# Patient Record
Sex: MALE | Race: WHITE | Employment: FULL TIME | URBAN - METROPOLITAN AREA
[De-identification: names, ages, dates, MRNs, and addresses within clinical notes are randomized per-mention and may not be internally consistent; named-entity substitution may affect disease eponyms.]

---

## 2018-10-18 ENCOUNTER — OFFICE VISIT (OUTPATIENT)
Dept: ORTHOPEDIC SURGERY | Age: 38
End: 2018-10-18
Payer: COMMERCIAL

## 2018-10-18 VITALS
WEIGHT: 205 LBS | SYSTOLIC BLOOD PRESSURE: 114 MMHG | HEART RATE: 67 BPM | HEIGHT: 68 IN | DIASTOLIC BLOOD PRESSURE: 71 MMHG | BODY MASS INDEX: 31.07 KG/M2

## 2018-10-18 DIAGNOSIS — M25.561 RIGHT KNEE PAIN, UNSPECIFIED CHRONICITY: Primary | ICD-10-CM

## 2018-10-18 DIAGNOSIS — M17.11 PRIMARY OSTEOARTHRITIS OF RIGHT KNEE: ICD-10-CM

## 2018-10-18 DIAGNOSIS — M65.9 SYNOVITIS OF RIGHT KNEE: ICD-10-CM

## 2018-10-18 DIAGNOSIS — M22.41 CHONDROMALACIA PATELLAE OF RIGHT KNEE: ICD-10-CM

## 2018-10-18 PROBLEM — M65.961 SYNOVITIS OF RIGHT KNEE: Status: ACTIVE | Noted: 2018-10-18

## 2018-10-18 PROCEDURE — 20610 DRAIN/INJ JOINT/BURSA W/O US: CPT | Performed by: FAMILY MEDICINE

## 2018-10-18 PROCEDURE — MISCD110 LATERAL STABILIZER: Performed by: FAMILY MEDICINE

## 2018-10-18 PROCEDURE — 99243 OFF/OP CNSLTJ NEW/EST LOW 30: CPT | Performed by: FAMILY MEDICINE

## 2018-10-18 RX ORDER — PANTOPRAZOLE SODIUM 20 MG/1
TABLET, DELAYED RELEASE ORAL
COMMUNITY
Start: 2018-09-17 | End: 2020-11-02

## 2018-10-18 RX ORDER — DICLOFENAC SODIUM 75 MG/1
75 TABLET, DELAYED RELEASE ORAL 2 TIMES DAILY
Qty: 60 TABLET | Refills: 3 | Status: SHIPPED | OUTPATIENT
Start: 2018-10-18 | End: 2019-07-16 | Stop reason: ALTCHOICE

## 2018-10-18 NOTE — PROGRESS NOTES
Chief Complaint  Knee Pain (new patient, right knee pain)      Initial consultation episodic primarily anterior and occasionally medial right knee pain with swelling and palpable warmth    History of Present Illness:  Alanna Sarmiento is a 42 y.o. male who is a very pleasant active white male  for [employer] which is a fairly manual job is a patient of  Dr Mitesh Marquez who is being seen today in consultation from . Dr Mitesh Marqeuz for evaluation of ongoing episodic pain with palpable warmth and swelling over the past 18 months. Initially it was quite episodic happening 1 time about every 4 months but he has had a couple of episodes over the past 2 months where he has developed swelling and palpable warmth involving the knee. He was previously evaluated by Dr Mitesh Marquez but by that time his symptoms have resolved. He cannot recall any specific history of injury or new activity but his job is once again fairly manual in nature. He was sent for AP and lateral knee films on 9/14/2018 which did show some mild prepatellar soft tissue swelling with nonspecific joint effusion and some mild patellofemoral arthropathy. He was told to ice and take anti-inflammatories. He was also told to call should his symptoms recur which he did earlier this week. He was previously sent for an arthritis panel on 9/12/2018 which was negative for RAMILA with a negative uric acid. He had a negative rheumatoid factor back in 2017 He did not have his inflammatory markers checked. He has been icing and taking Tylenol. His job is fairly manual involving lots of squatting and kneeling. He does periodically use kneepads with working. His most frequent stair climbing. His most recent episode began on 10/16/2018 which prompted a phone call to Dr Mitesh Marquez who asked that we see him today for evaluation. He has had some crepitation and popping. No locking or catching.   No real night discomfort with Susana's testing without high-grade click. No evidence of instability. Screening hip test is benign. Minimal overpronation. Skin: There are no rashes, ulcerations or lesions. Distal neurovascular exam is intact. Gait: Fluid smooth gait. Minimal overpronation    Reflex symmetrically preserved    Additional Comments:     Additional Examinations:  Contralateral Exam: Examination of the left knee reveals intact skin. There is no focal tenderness. The patient demonstrates full painless range of motion with regards to flexion and extension. Strength is 5/5 thorough out all planes. Ligamentous stability is grossly intact. Right Lower Extremity: Examination of the right lower extremity does not show any tenderness, deformity or injury. Range of motion is unremarkable. There is no gross instability. There are no rashes, ulcerations or lesions. Strength and tone are normal.  Left Lower Extremity: Examination of the left lower extremity does not show any tenderness, deformity or injury. Range of motion is unremarkable. There is no gross instability. There are no rashes, ulcerations or lesions. Strength and tone are normal.      Diagnostic Test Findings: We did perform her right knee AP and PA weight-bearing and 7 results today in the office which show mild patellofemoral spurring and tilt. There is relative maintenance of his weightbearing spaces. Assessment :  #1.  Episodic recurrent right knee patellofemoral compression syndrome/chondromalacia with synovitis with mild underlying knee medial compartment osteoarthritis    Impression:  Encounter Diagnosis   Name Primary?  Right knee pain, unspecified chronicity Yes       Office Procedures:  Orders Placed This Encounter   Procedures    XR KNEE RIGHT (1-2 VIEWS)     Order Specific Question:   Reason for exam:     Answer:   pain       Treatment Plan:  Treatment options were discussed with Panda Wan.   We did review his plain

## 2018-10-24 ENCOUNTER — HOSPITAL ENCOUNTER (OUTPATIENT)
Dept: PHYSICAL THERAPY | Age: 38
Setting detail: THERAPIES SERIES
Discharge: HOME | End: 2018-10-24
Payer: COMMERCIAL

## 2018-10-24 PROCEDURE — G8978 MOBILITY CURRENT STATUS: HCPCS | Performed by: PHYSICAL THERAPIST

## 2018-10-24 PROCEDURE — 97110 THERAPEUTIC EXERCISES: CPT | Performed by: PHYSICAL THERAPIST

## 2018-10-24 PROCEDURE — 97161 PT EVAL LOW COMPLEX 20 MIN: CPT | Performed by: PHYSICAL THERAPIST

## 2018-10-24 PROCEDURE — 97112 NEUROMUSCULAR REEDUCATION: CPT | Performed by: PHYSICAL THERAPIST

## 2018-10-24 PROCEDURE — G8979 MOBILITY GOAL STATUS: HCPCS | Performed by: PHYSICAL THERAPIST

## 2018-10-24 NOTE — PLAN OF CARE
[address] [facility],  [address]  Phone: [phone]   SANDRA: [phone]                                                       Physical Therapy Certification    Dear Referring Practitioner: Dr. Krista Miller,    We had the pleasure of evaluating the following patient for physical therapy services at [address] [place] [address]. A summary of our findings can be found in the initial assessment below. This includes our plan of care. If you have any questions or concerns regarding these findings, please do not hesitate to contact me at the office phone number checked above. Thank you for the referral.       Physician Signature:_______________________________Date:__________________  By signing above (or electronic signature), therapists plan is approved by physician      Patient: Yadira Monroy   : 1980   MRN: [id number]  Referring Physician: Referring Practitioner: Dr. 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      Precautions/ Contra-indications: None   Latex Allergy:  [x]NO      []YES  Preferred Language for Healthcare:   [x]English       []other:    SUBJECTIVE: Patient stated complaint: states his right knee pain started a couple of years ago. He states the pain is sporadic. He states he would have pain and increased swelling that would last about a week. Then the swelling would go down and the pain would subside, but then it would happen again about 6 months later. He saw his PCP within the year, who ordered x-rays and said they were negative.  He knee/Hip   []Signs/symptoms consistent with functional hip weakness/NMR control      []Signs/symptoms consistent with tendinitis/tendinosis    []signs/symptoms consistent with pathology which may benefit from Dry needling      []other:      Prognosis/Rehab Potential:      [x]Excellent   [x]Good    []Fair   []Poor    Tolerance of evaluation/treatment:    [x]Excellent   [x]Good    []Fair   []Poor    Physical Therapy Evaluation Complexity Justification  [x] A history of present problem with:  [] no personal factors and/or comorbidities that impact the plan of care;  [x]1-2 personal factors and/or comorbidities that impact the plan of care  []3 personal factors and/or comorbidities that impact the plan of care  [x] An examination of body systems using standardized tests and measures addressing any of the following: body structures and functions (impairments), activity limitations, and/or participation restrictions;:  [] a total of 1-2 or more elements   [] a total of 3 or more elements   [x] a total of 4 or more elements   [x] A clinical presentation with:  [x] stable and/or uncomplicated characteristics   [] evolving clinical presentation with changing characteristics  [] unstable and unpredictable characteristics;   [x] Clinical decision making of [x] low, [] moderate, [] high complexity using standardized patient assessment instrument and/or measurable assessment of functional outcome.     [x] EVAL (LOW) 26698 (typically 20 minutes face-to-face)  [] EVAL (MOD) 22402 (typically 30 minutes face-to-face)  [] EVAL (HIGH) 50980 (typically 45 minutes face-to-face)  [] RE-EVAL       PLAN  Frequency/Duration: 1-2 days per week for 4 Weeks:  Interventions:  [x]  Therapeutic exercise including: strength training, ROM, for Lower extremity and core   [x]  NMR activation and proprioception for LE, Glutes and Core   [x]  Manual therapy as indicated for LE, Hip and spine to include: Dry Needling/IASTM, STM, PROM, Gr I-IV

## 2018-10-24 NOTE — FLOWSHEET NOTE
and LE for self care, mobility, lifting, and ambulation/stair navigation      Manual Treatments:  PROM / STM / Oscillations-Mobs:  G-I, II, III, IV (PA's, Inf., Post.)  [] (57951) Provided manual therapy to mobilize LE, proximal hip and/or LS spine soft tissue/joints for the purpose of modulating pain, promoting relaxation,  increasing ROM, reducing/eliminating soft tissue swelling/inflammation/restriction, improving soft tissue extensibility and allowing for proper ROM for normal function with self care, mobility, lifting and ambulation. Modalities:  Declined 10/24    Charges:  Timed Code Treatment Minutes: 25'   Total Treatment Minutes: 54'        [x] EVAL (LOW) 56648 (typically 20 minutes face-to-face)  [] EVAL (MOD) 81026 (typically 30 minutes face-to-face)  [] EVAL (HIGH) 43447 (typically 45 minutes face-to-face)  [] RE-EVAL   [x] YD(17242) x  1   [] IONTO  [x] NMR (57141) x  1   [] VASO  [] Manual (20835) x       [] Other:  [] TA x       [] Mech Traction (40191)  [] ES(attended) (95395)      [] ES (un) (57622):     GOALS:   Patient stated goal: get the right knee right    Therapist goals for Patient:   Short Term Goals: To be achieved in: 2 weeks  1. Independent in HEP and progression per patient tolerance, in order to prevent re-injury. 2. Patient will have a decrease in pain to facilitate improvement in movement, function, and ADLs as indicated by Functional Deficits. Long Term Goals: To be achieved in: 4 weeks  1. Disability index score of 5% or less for the LEFS to assist with reaching prior level of function. 2. Patient will demonstrate an increase in right hip (flex, ABD, and ext) and knee (flex and ext) strength to 4+/5 to allow for proper functional mobility as indicated by patients Functional Deficits. 3. Patient will return to ADLs without increased symptoms or restriction. 4. Patient will report kneeling at work pain free.      Progression Towards Functional goals:  [] Patient is

## 2018-10-29 ENCOUNTER — HOSPITAL ENCOUNTER (OUTPATIENT)
Dept: PHYSICAL THERAPY | Age: 38
Setting detail: THERAPIES SERIES
Discharge: HOME | End: 2018-10-29
Payer: COMMERCIAL

## 2018-10-29 PROCEDURE — 97112 NEUROMUSCULAR REEDUCATION: CPT | Performed by: PHYSICAL THERAPIST

## 2018-10-29 PROCEDURE — 97110 THERAPEUTIC EXERCISES: CPT | Performed by: PHYSICAL THERAPIST

## 2018-10-29 NOTE — FLOWSHEET NOTE
[address] [facility] [place] [address]  Phone: [phone]   WSJ: [phone]    Physical Therapy Daily Treatment Note  Date:  10/29/2018    Patient Name:  Alia Dubon    :  1980  MRN: [id number]  Restrictions/Precautions:    Medical/Treatment Diagnosis Information:  Diagnosis: Right knee pain - M25.561; Primary osteoarthritis of R knee - M17.11; Chondromalacia patellae of R knee - M22.41; Synovitis of right knee - M65.9  Treatment Diagnosis: Decreased strength;Decreased endurance;Decreased high-level IADLs; Insurance/Certification information:  PT Insurance Information: [place]   Physician Information:  Referring Practitioner: Dr. Akash Fuentes of care signed (Y/N): Y    Date of Patient follow up with Physician:     G-Code (if applicable):      Date G-Code Applied:  10/24/18  PT G-Codes  Functional Assessment Tool Used: WOMAC  Score: 10.4% limitation  Functional Limitation: Mobility: Walking and moving around  Mobility: Walking and Moving Around Current Status (): At least 1 percent but less than 20 percent impaired, limited or restricted  Mobility: Walking and Moving Around Goal Status (): 0 percent impaired, limited or restricted    Progress Note: []  Yes  [x]  No  Next due by: 2018       Latex Allergy:  [x]NO      []YES  Preferred Language for Healthcare:   [x]English       []other:    Visit # Insurance Allowable   2 30     Pain level: 0 /10 10/29     SUBJECTIVE:  States he was a little sore from the HEP. He states he had no problems at work, but he didn't normally have a constant pain at work.   10/29    OBJECTIVE: See eval 10/24  Observation:   Test measurements:      RESTRICTIONS/PRECAUTIONS: None     Exercises/Interventions:   Exercise/Equipment Resistance/Repetitions Other comments   Stretching     Hamstring - seated 30 sec x 5 Added 10/24   Towel Pull     Inclined Calf     Hip Flexion     ITB     Groin

## 2018-10-31 ENCOUNTER — HOSPITAL ENCOUNTER (OUTPATIENT)
Dept: PHYSICAL THERAPY | Age: 38
Setting detail: THERAPIES SERIES
Discharge: HOME | End: 2018-10-31
Payer: COMMERCIAL

## 2018-10-31 PROCEDURE — 97110 THERAPEUTIC EXERCISES: CPT | Performed by: PHYSICAL THERAPIST

## 2018-10-31 PROCEDURE — 97112 NEUROMUSCULAR REEDUCATION: CPT | Performed by: PHYSICAL THERAPIST

## 2018-10-31 PROCEDURE — 97530 THERAPEUTIC ACTIVITIES: CPT | Performed by: PHYSICAL THERAPIST

## 2018-10-31 NOTE — FLOWSHEET NOTE
SLR     Supine 10 x 3 #2 Added 10/24   Abduction 10 x 3 #2 Added 10/24   Adduction     Prone 10 x 3 #2 Added 10/24   SLR+ 15 sec x 5 Added 10/24        ROM     Sheet Pulls     Hang Weights          CKC     Calf raises 10 x 3 Added 10/29   Wall sits 15 sec x 5 Added 10/29   Step ups 10 x 3 L1 Added 10/29   1 leg stand 30 sec x 5 Added 10/24   Squatting     CC TKE     Balance     bridges     Clamshells 10 x 3, clamshells ^10/31   Lateral band walks 35' x 4 Added 10/31        PRE     Extension  RANGE:   Flexion  RANGE:        Quantum machines     Leg press      Leg extension     Leg curl          Manual interventions                 Therapeutic Exercise and NMR EXR  [x] (77928) Provided verbal/tactile cueing for activities related to strengthening, flexibility, endurance, ROM for improvements in LE, proximal hip, and core control with self care, mobility, lifting, ambulation.  [] (78293) Provided verbal/tactile cueing for activities related to improving balance, coordination, kinesthetic sense, posture, motor skill, proprioception  to assist with LE, proximal hip, and core control in self care, mobility, lifting, ambulation and eccentric single leg control.      NMR and Therapeutic Activities:    [] (58729 or 34236) Provided verbal/tactile cueing for activities related to improving balance, coordination, kinesthetic sense, posture, motor skill, proprioception and motor activation to allow for proper function of core, proximal hip and LE with self care and ADLs  [] (69032) Gait Re-education- Provided training and instruction to the patient for proper LE, core and proximal hip recruitment and positioning and eccentric body weight control with ambulation re-education including up and down stairs     Home Exercise Program:    [x] (55342) Reviewed/Progressed HEP activities related to strengthening, flexibility, endurance, ROM of core, proximal hip and LE for functional self-care, mobility, lifting and ambulation/stair

## 2018-11-05 ENCOUNTER — HOSPITAL ENCOUNTER (OUTPATIENT)
Dept: PHYSICAL THERAPY | Age: 38
Setting detail: THERAPIES SERIES
Discharge: HOME | End: 2018-11-05
Payer: COMMERCIAL

## 2018-11-05 PROCEDURE — 97112 NEUROMUSCULAR REEDUCATION: CPT | Performed by: PHYSICAL THERAPIST

## 2018-11-05 PROCEDURE — 97110 THERAPEUTIC EXERCISES: CPT | Performed by: PHYSICAL THERAPIST

## 2018-11-05 PROCEDURE — 97530 THERAPEUTIC ACTIVITIES: CPT | Performed by: PHYSICAL THERAPIST

## 2018-11-05 NOTE — FLOWSHEET NOTE
[address] [facility],[address], [address] [place] [address],  [address]  Phone: [phone]   VCV: [phone]    Physical Therapy Daily Treatment Note  Date:  2018    Patient Name:  Daysi Yancey    :  1980  MRN: [id number]  Restrictions/Precautions:    Medical/Treatment Diagnosis Information:  Diagnosis: Right knee pain - M25.561; Primary osteoarthritis of R knee - M17.11; Chondromalacia patellae of R knee - M22.41; Synovitis of right knee - M65.9  Treatment Diagnosis: Decreased strength;Decreased endurance;Decreased high-level IADLs; Insurance/Certification information:  PT Insurance Information: [place]   Physician Information:  Referring Practitioner: Dr. Eaton Mean of care signed (Y/N): Y    Date of Patient follow up with Physician:     G-Code (if applicable):      Date G-Code Applied:  10/24/18  PT G-Codes  Functional Assessment Tool Used: WOMAC  Score: 10.4% limitation  Functional Limitation: Mobility: Walking and moving around  Mobility: Walking and Moving Around Current Status (): At least 1 percent but less than 20 percent impaired, limited or restricted  Mobility: Walking and Moving Around Goal Status (): 0 percent impaired, limited or restricted    Progress Note: []  Yes  [x]  No  Next due by: 2018       Latex Allergy:  [x]NO      []YES  Preferred Language for Healthcare:   [x]English       []other:    Visit # Insurance Allowable   4 30     Pain level: 0  /10      SUBJECTIVE:  States he did have some swelling on Friday night () after getting on the floor and playing with his kids. He had lateral knee pain and patellar tendon pain and a normal amount of his swelling.       OBJECTIVE: See eval 10/24  Observation:   Test measurements:      RESTRICTIONS/PRECAUTIONS: None     Exercises/Interventions:   Exercise/Equipment Resistance/Repetitions Other comments   Stretching     Hamstring - seated 30 sec x 5 Added 10/24 [x] (22703) Reviewed/Progressed HEP activities related to strengthening, flexibility, endurance, ROM of core, proximal hip and LE for functional self-care, mobility, lifting and ambulation/stair navigation   [] (18394)Reviewed/Progressed HEP activities related to improving balance, coordination, kinesthetic sense, posture, motor skill, proprioception of core, proximal hip and LE for self care, mobility, lifting, and ambulation/stair navigation      Manual Treatments:  PROM / STM / Oscillations-Mobs:  G-I, II, III, IV (PA's, Inf., Post.)  [] (69865) Provided manual therapy to mobilize LE, proximal hip and/or LS spine soft tissue/joints for the purpose of modulating pain, promoting relaxation,  increasing ROM, reducing/eliminating soft tissue swelling/inflammation/restriction, improving soft tissue extensibility and allowing for proper ROM for normal function with self care, mobility, lifting and ambulation. Modalities:  Declined 11/5    Charges:  Timed Code Treatment Minutes: 39'    Total Treatment Minutes: 46'         [] EVAL (LOW) 29749 (typically 20 minutes face-to-face)  [] EVAL (MOD) 15767 (typically 30 minutes face-to-face)  [] EVAL (HIGH) 64181 (typically 45 minutes face-to-face)  [] RE-EVAL   [x] KJ(72156) x  1   [] IONTO  [x] NMR (80292) x  1   [] VASO  [] Manual (16760) x       [] Other:  [x] TA x  1    [] Mech Traction (37509)  [] ES(attended) (39283)      [] ES (un) (21266):     GOALS:   Patient stated goal: get the right knee right    Therapist goals for Patient:   Short Term Goals: To be achieved in: 2 weeks  1. Independent in HEP and progression per patient tolerance, in order to prevent re-injury. 2. Patient will have a decrease in pain to facilitate improvement in movement, function, and ADLs as indicated by Functional Deficits. Long Term Goals: To be achieved in: 4 weeks  1. Disability index score of 5% or less for the LEFS to assist with reaching prior level of function.    2. Patient

## 2018-11-12 ENCOUNTER — HOSPITAL ENCOUNTER (OUTPATIENT)
Dept: PHYSICAL THERAPY | Age: 38
Setting detail: THERAPIES SERIES
Discharge: HOME | End: 2018-11-12
Payer: COMMERCIAL

## 2018-11-12 PROCEDURE — 97530 THERAPEUTIC ACTIVITIES: CPT | Performed by: PHYSICAL THERAPIST

## 2018-11-12 PROCEDURE — 97112 NEUROMUSCULAR REEDUCATION: CPT | Performed by: PHYSICAL THERAPIST

## 2018-11-12 PROCEDURE — G8979 MOBILITY GOAL STATUS: HCPCS | Performed by: PHYSICAL THERAPIST

## 2018-11-12 PROCEDURE — 97110 THERAPEUTIC EXERCISES: CPT | Performed by: PHYSICAL THERAPIST

## 2018-11-12 PROCEDURE — G8978 MOBILITY CURRENT STATUS: HCPCS | Performed by: PHYSICAL THERAPIST

## 2018-11-12 NOTE — PLAN OF CARE
right knee - M65.9  Treatment Diagnosis: Decreased strength;Decreased endurance;Decreased high-level IADLs; Insurance/Certification information:  PT Insurance Information: [employer]   Physician Information:  Referring Practitioner: Dr. Kemal Child of care signed (Y/N): Y    Date of Patient follow up with Physician:     G-Code (if applicable):      Date G-Code Applied:  11/12/18  PT G-Codes  Functional Assessment Tool Used: WOMAC  Score: 5% limitation  Functional Limitation: Mobility: Walking and moving around  Mobility: Walking and Moving Around Current Status (): At least 1 percent but less than 20 percent impaired, limited or restricted  Mobility: Walking and Moving Around Goal Status (): 0 percent impaired, limited or restricted       Progress Note: [x]  Yes  []  No  Next due by: 10 Dec 2018       Latex Allergy:  [x]NO      []YES  Preferred Language for Healthcare:   [x]English       []other:    Visit # Insurance Allowable   5 30     Pain level: 0  /10 11/12     SUBJECTIVE:  States he has not had any episodes of pain or swelling since last session. He states he is still trying to avoid kneeling on his RLE while working. Pt sees the MD on Thursday.  11/12    OBJECTIVE: 11/12  Observation:   Test measurements:         Flexibility L R Comment   Hamstring Lacking 38 deg  Lacking 25 deg      ITB WFL  WFL      Quad Decreased WFL                         ROM AROM Comment     L R     Flexion 142 145     Extension 0 2 deg hyperext                     Strength L R Comment   Quad 4/5 4+/5     Hamstring 4/5 4+/5               Hip  flexion 4/5 4/5     Hip abd 4/5 4+/5     Hip ext  4-/5 4+/5                     Functional Mobility/Transfers: Decreased ability to kneel      DL squat: knee valgus, narrow stance of support        RESTRICTIONS/PRECAUTIONS: None     Exercises/Interventions:   Exercise/Equipment Resistance/Repetitions Other comments   Stretching     Hamstring - seated 30 sec x 5 Added 10/24   Towel Pull down stairs     Home Exercise Program:    [x] (56948) Reviewed/Progressed HEP activities related to strengthening, flexibility, endurance, ROM of core, proximal hip and LE for functional self-care, mobility, lifting and ambulation/stair navigation   [] (14221)Reviewed/Progressed HEP activities related to improving balance, coordination, kinesthetic sense, posture, motor skill, proprioception of core, proximal hip and LE for self care, mobility, lifting, and ambulation/stair navigation      Manual Treatments:  PROM / STM / Oscillations-Mobs:  G-I, II, III, IV (PA's, Inf., Post.)  [] (20678) Provided manual therapy to mobilize LE, proximal hip and/or LS spine soft tissue/joints for the purpose of modulating pain, promoting relaxation,  increasing ROM, reducing/eliminating soft tissue swelling/inflammation/restriction, improving soft tissue extensibility and allowing for proper ROM for normal function with self care, mobility, lifting and ambulation. Modalities:  Declined 11/12    Charges:  Timed Code Treatment Minutes: 45    Total Treatment Minutes: 60'        [] EVAL (LOW) 83874 (typically 20 minutes face-to-face)  [] EVAL (MOD) 44825 (typically 30 minutes face-to-face)  [] EVAL (HIGH) 40196 (typically 45 minutes face-to-face)  [] RE-EVAL   [x] CY(21598) x  1   [] IONTO  [x] NMR (66946) x  1   [] VASO  [] Manual (85216) x       [] Other:  [x] TA x  1    [] Mech Traction (92761)  [] ES(attended) (04517)      [] ES (un) (89729):     GOALS: 11/12  Patient stated goal: get the right knee right - Progressing towards    Therapist goals for Patient:   Short Term Goals: To be achieved in: 2 weeks  1. Independent in HEP and progression per patient tolerance, in order to prevent re-injury. - MET  2. Patient will have a decrease in pain to facilitate improvement in movement, function, and ADLs as indicated by Functional Deficits. - MET    Long Term Goals: To be achieved in: 4 weeks  1.  Disability index score of 5% or less

## 2018-11-15 ENCOUNTER — OFFICE VISIT (OUTPATIENT)
Dept: ORTHOPEDIC SURGERY | Age: 38
End: 2018-11-15
Payer: COMMERCIAL

## 2018-11-15 VITALS
DIASTOLIC BLOOD PRESSURE: 81 MMHG | SYSTOLIC BLOOD PRESSURE: 118 MMHG | HEIGHT: 68 IN | BODY MASS INDEX: 31.07 KG/M2 | WEIGHT: 205.03 LBS | HEART RATE: 69 BPM

## 2018-11-15 DIAGNOSIS — M22.41 CHONDROMALACIA PATELLAE OF RIGHT KNEE: ICD-10-CM

## 2018-11-15 DIAGNOSIS — M25.561 RIGHT KNEE PAIN, UNSPECIFIED CHRONICITY: ICD-10-CM

## 2018-11-15 DIAGNOSIS — M17.11 PRIMARY OSTEOARTHRITIS OF RIGHT KNEE: Primary | ICD-10-CM

## 2018-11-15 DIAGNOSIS — M65.9 SYNOVITIS OF RIGHT KNEE: ICD-10-CM

## 2018-11-15 PROCEDURE — 99214 OFFICE O/P EST MOD 30 MIN: CPT | Performed by: FAMILY MEDICINE

## 2018-11-16 NOTE — PROGRESS NOTES
Chief Complaint  Knee Pain (CK RIGHT KNEE)      Follow-up right knee symptomatic patellofemoral compression syndrome with chondromalacia knee synovitis and mild underlying medial compartment osteoarthritis with synovitis    History of Present Illness:  Shalonda Lo is a 42 y.o. male who is a very pleasant active white male  for [employer] which is a fairly manual job is a patient of  Dr Roma Ramachandran who is being seen today in consultation from Dr. Dr Roma Ramachandran for evaluation of ongoing episodic pain with palpable warmth and swelling over the past 18 months. Initially it was quite episodic happening 1 time about every 4 months but he has had a couple of episodes over the past 2 months where he has developed swelling and palpable warmth involving the knee. He was previously evaluated by Dr Roma Ramachandran but by that time his symptoms have resolved. He cannot recall any specific history of injury or new activity but his job is once again fairly manual in nature. He was sent for AP and lateral knee films on 9/14/2018 which did show some mild prepatellar soft tissue swelling with nonspecific joint effusion and some mild patellofemoral arthropathy. He was told to ice and take anti-inflammatories. He was also told to call should his symptoms recur which he did earlier this week. He was previously sent for an arthritis panel on 9/12/2018 which was negative for RAMILA with a negative uric acid. He had a negative rheumatoid factor back in 2017 He did not have his inflammatory markers checked. He has been icing and taking Tylenol. His job is fairly manual involving lots of squatting and kneeling. He does periodically use kneepads with working. His most frequent stair climbing. His most recent episode began on 10/16/2018 which prompted a phone call to Dr Roma Ramachandran who asked that we see him today for evaluation. He has had some crepitation and popping.   No locking or catching. No real night pain. He does have an achy pain anteriorly involving the name at 2-3 out of 10 but it can be very prominently painful coming home from work at 7-8 out of 10. He does typically ice. He is being seen today for orthopedic and sports consultation and review of his imaging. He was seen initially in the office in 10/18/2018 was started on conservative treatment for his right knee. Initially he believed he was substantially improved with improvement of nearly 100% for the 1st couple of weeks into treatment. Over the last 2 weeks, he has noticed gradual worsening of pain was into the anterior medial portion of his knee to the radius slid back to about a 60% improvement. Repetitive stair climbing and squatting kneeling and stooping as well as climbing ladders as painful. He has had crepitation and popping. The knee brace does help with his pseudo-buckling. He does catch. He isn't taking his anti-inflammatories only episodically has been reasonably compliant with his home exercises. He did attend physical therapy. Overall his symptoms do seem to be worsening. Medical History     Patient's medications, allergies, past medical, surgical, social and family histories were reviewed and updated as appropriate. Review of Systems  Pertinent items are noted in HPI  Review of systems reviewed from Patient History Form dated on 10/18/2018 and available in the patient's chart under the Media tab. Vital Signs  Vitals:    11/15/18 0947   BP: 118/81   Pulse: 69       General Exam:     Constitutional: Patient is adequately groomed with no evidence of malnutrition  DTRs: Deep tendon reflexes are intact  Mental Status: The patient is oriented to time, place and person. The patient's mood and affect are appropriate. Lymphatic: The lymphatic examination bilaterally reveals all areas to be without enlargement or induration.   Vascular: Examination reveals no swelling or calf not show any tenderness, deformity or injury. Range of motion is unremarkable. There is no gross instability. There are no rashes, ulcerations or lesions. Strength and tone are normal.      Diagnostic Test Findings:       Assessment :  #1. Persistent symptomatically worsening right knee patellofemoral compression syndrome/chondromalacia with synovitis with mild underlying knee medial compartment osteoarthritis    Impression:  Encounter Diagnoses   Name Primary?  Primary osteoarthritis of right knee Yes    Chondromalacia patellae of right knee     Synovitis of right knee     Right knee pain, unspecified chronicity        Office Procedures:  Orders Placed This Encounter   Procedures    MRI KNEE RIGHT WO CONTRAST     Standing Status:   Future     Standing Expiration Date:   11/15/2019     Scheduling Instructions:      Pro Scan [place]     Order Specific Question:   Reason for exam:     Answer:   evaluate oa/cmp. r/o mmt       Treatment Plan:  Treatment options were discussed with Ivett Damon. We did review his previous plain films and exam findings. He has had episodic primarily anterior knee pain symptoms which I suspect is related to patellofemoral compression syndrome/chondromalacia although he does have some mild weightbearing osteoarthritis. With initial treatment, he felt as if he was improving but over the last couple weeks he does seem to be worsening once again. We did set him up for an MRI to evaluate the degree of his degenerative changes and to rule out a medial meniscus tear. I did encourage him to take his diclofenac twice daily consistently utilize his brace to perform his home-based exercises. Icing and activity modification was discussed. I will see him back post-imaging. Potential for Visco supplementation was also discussed. He will contact us the interim with questions or concerns and we will see him back after his MRI.       Cc: Dr. Nic Combs      This dictation

## 2018-11-21 ENCOUNTER — TELEPHONE (OUTPATIENT)
Dept: ORTHOPEDIC SURGERY | Age: 38
End: 2018-11-21

## 2018-11-21 NOTE — TELEPHONE ENCOUNTER
Spoke to patient and informed them that their MRI/CT has been authorized and that they can call and schedule scan at their convenience. Also told them that they can call and schedule a f/u with Dr. Adelfo Fraire once they have MRI/CT scheduled, leaving at least 2-3 days for our office to receive their results.

## 2018-12-13 ENCOUNTER — OFFICE VISIT (OUTPATIENT)
Dept: ORTHOPEDIC SURGERY | Age: 38
End: 2018-12-13
Payer: COMMERCIAL

## 2018-12-13 VITALS
BODY MASS INDEX: 33.34 KG/M2 | DIASTOLIC BLOOD PRESSURE: 82 MMHG | SYSTOLIC BLOOD PRESSURE: 122 MMHG | WEIGHT: 220 LBS | HEART RATE: 69 BPM | HEIGHT: 68 IN

## 2018-12-13 DIAGNOSIS — M70.41 PREPATELLAR BURSITIS OF RIGHT KNEE: Primary | ICD-10-CM

## 2018-12-13 DIAGNOSIS — M17.11 PRIMARY OSTEOARTHRITIS OF RIGHT KNEE: ICD-10-CM

## 2018-12-13 DIAGNOSIS — M25.561 RIGHT KNEE PAIN, UNSPECIFIED CHRONICITY: ICD-10-CM

## 2018-12-13 DIAGNOSIS — M22.41 CHONDROMALACIA PATELLAE OF RIGHT KNEE: ICD-10-CM

## 2018-12-13 PROCEDURE — 99213 OFFICE O/P EST LOW 20 MIN: CPT | Performed by: FAMILY MEDICINE

## 2018-12-13 PROCEDURE — 20610 DRAIN/INJ JOINT/BURSA W/O US: CPT | Performed by: FAMILY MEDICINE

## 2018-12-13 RX ORDER — DICLOFENAC SODIUM 75 MG/1
75 TABLET, DELAYED RELEASE ORAL 2 TIMES DAILY
Qty: 60 TABLET | Refills: 3 | Status: SHIPPED | OUTPATIENT
Start: 2018-12-13 | End: 2019-01-15 | Stop reason: SDUPTHER

## 2018-12-13 NOTE — PROGRESS NOTES
Injection administration details:  Date & Time: 12/13/18 8:59 AM  Site & Comments:Right Knee: Prepatellar bursa administered by Dr Randall Morales    Betamethasone (30mg/mL)  # of cc: 1  [facility]:6130-7503-47   Lot number: [id number]  EXP: 03/2020    Bupivacaine 0.25%  # of cc:1  [facility]: 0256-8851-20  Lot number: [id number]  EXP: 02/1/2019    1% Lidocaine (10mg/ mL)  # of cc:1  [facility]: 6786-7571-38  Lot number: [id number]  EXP: 05/2020

## 2018-12-13 NOTE — PROGRESS NOTES
Chief Complaint  Knee Pain (follow up on right knee MRI results)      Follow-up right knee symptomatic patellofemoral compression syndrome with chondromalacia knee synovitis and mild underlying medial compartment osteoarthritis with synovitis with prepatellar bursitis. Review of imaging    History of Present Illness:  Lázaro Golden is a 34 y.o. male who is a very pleasant active white male  for [employer] which is a fairly manual job is a patient of  Dr Linda Parker who is being seen today in consultation from . Dr Linda Parker for evaluation of ongoing episodic pain with palpable warmth and swelling over the past 18 months. Initially it was quite episodic happening 1 time about every 4 months but he has had a couple of episodes over the past 2 months where he has developed swelling and palpable warmth involving the knee. He was previously evaluated by Dr Linda Parker but by that time his symptoms have resolved. He cannot recall any specific history of injury or new activity but his job is once again fairly manual in nature. He was sent for AP and lateral knee films on 9/14/2018 which did show some mild prepatellar soft tissue swelling with nonspecific joint effusion and some mild patellofemoral arthropathy. He was told to ice and take anti-inflammatories. He was also told to call should his symptoms recur which he did earlier this week. He was previously sent for an arthritis panel on 9/12/2018 which was negative for RAMILA with a negative uric acid. He had a negative rheumatoid factor back in 2017 He did not have his inflammatory markers checked. He has been icing and taking Tylenol. His job is fairly manual involving lots of squatting and kneeling. He does periodically use kneepads with working. His most frequent stair climbing.   His most recent episode began on 10/16/2018 which prompted a phone call to Dr Linda Parker who asked that we see him kneeling repetitive stair climbing and positional changes. He has been more compliant with taking his anti-inflammatories and is trying to do his home-based exercises. He did not go back to physical therapy after his last visit. Medical History     Patient's medications, allergies, past medical, surgical, social and family histories were reviewed and updated as appropriate. Review of Systems  Pertinent items are noted in HPI  Review of systems reviewed from Patient History Form dated on 10/18/2018 and available in the patient's chart under the Media tab. Vital Signs  Vitals:    12/13/18 0833   BP: 122/82   Pulse: 69       General Exam:     Constitutional: Patient is adequately groomed with no evidence of malnutrition  DTRs: Deep tendon reflexes are intact  Mental Status: The patient is oriented to time, place and person. The patient's mood and affect are appropriate. Lymphatic: The lymphatic examination bilaterally reveals all areas to be without enlargement or induration. Vascular: Examination reveals no swelling or calf tenderness. Peripheral pulses are palpable and 2+. Neurological: The patient has good coordination. There is no weakness or sensory deficit. Knee Examination  Inspection:  There is no high-grade deformity or palpable warmth currently. He may have a trace knee joint effusion he does have some patellofemoral crepitation. Focal swelling over the prepatellar bursa. No erythematous change or change suggest septic joint. Palpation:  He does have slightly less prominent tenderness over the medial and lateral patellofemoral facet. He does have mild anterior 3rd medial joint line tenderness. No lateral tenderness. He does have some posterior medial knee joint line tenderness worsening with Susana's. Rang of Motion:  He does have full active and passive range of motion with tightness to his hamstrings. Strength:  4 plus out of 5 with flexion and extension.     Special bursitis of right knee Yes    Chondromalacia patellae of right knee     Right knee pain, unspecified chronicity        Office Procedures:  Orders Placed This Encounter   Procedures    Ambulatory referral to Physical Therapy     Referral Priority:   Routine     Referral Type:   Eval and Treat     Referral Reason:   Specialty Services Required     Requested Specialty:   Physical Therapy     Number of Visits Requested:   1    EUFLEXXA INJ PER DOSE     Right Knee     Standing Status:   Future     Standing Expiration Date:   12/13/2019    IN ARTHROCENTESIS ASPIR&/INJ MAJOR JT/BURSA W/O US    IN BETAMETHASONE ACET&SOD PHOSP       Treatment Plan:  Treatment options were discussed with Tamiko Santoyo. We did review his MRI films and exam findings. He has had episodic primarily anterior knee pain symptoms which I suspect is related to patellofemoral compression syndrome/chondromalacia although he does have some mild weightbearing osteoarthritis. He seems to be having most of his symptoms from his prepatellar bursitis. We did aspirate his prepatellar bursa with return of about 10 mL of bloody bursal fluid followed by instillation of 1 mL of Celestone, 1 of Marcaine, 1 of Xylocaine. We'll like him to go back to physical therapy for local modalities such as ultrasound. He will continue with this patellar protection program and patellofemoral rehab. He was strongly encouraged to take his anti-inflammatories And was changed to diclofenac 75 mg 1 pill twice daily. Collette Bar He will avoid direct kneeling and icing and activity modification was discussed. Potential for Visco supplementation also discussed. We'll see him back in 4 weeks for clinical recheck. He will contact us with questions or concerns. Cc: Dr. Mona Adrian      This dictation was performed with a verbal recognition program [facility]) and it was checked for errors. It is possible that there are still dictated errors within this office note.  If

## 2018-12-17 ENCOUNTER — HOSPITAL ENCOUNTER (OUTPATIENT)
Dept: PHYSICAL THERAPY | Age: 38
Setting detail: THERAPIES SERIES
Discharge: HOME | End: 2018-12-17
Payer: COMMERCIAL

## 2018-12-17 PROCEDURE — 97530 THERAPEUTIC ACTIVITIES: CPT | Performed by: PHYSICAL THERAPIST

## 2018-12-17 PROCEDURE — G8979 MOBILITY GOAL STATUS: HCPCS | Performed by: PHYSICAL THERAPIST

## 2018-12-17 PROCEDURE — G8978 MOBILITY CURRENT STATUS: HCPCS | Performed by: PHYSICAL THERAPIST

## 2018-12-17 PROCEDURE — 97110 THERAPEUTIC EXERCISES: CPT | Performed by: PHYSICAL THERAPIST

## 2018-12-17 NOTE — PLAN OF CARE
Added 10/24   Towel Pull     Inclined Calf     Hip Flexion     ITB     Groin     Quad -  prone 30 sec x 5 Added 10/24        SLR     Supine 10 x 3 #4 ^12/17   Abduction 10 x 3 #4 ^12/17   Adduction     Prone 10 x 3 #4 ^12/17   SLR+ 30 sec x 5 ^11/5        ROM     Sheet Pulls     Hang Weights          CKC     Calf raises  Added 10/29   Wall sits 15 sec x 5 with red loop TB with hip ABD  ^12/17   Step ups 10 x 3 green + pink ^12/17   1 leg stand 30 sec x 5 Added 10/24   Squatting     CC TKE     Balance     bridges     Clamshells 10 x 3, clamshells, red loop TB  ^10/31   Lateral band walks 35' x 4, red loop TB  Added 10/31   TRX squats 10 x 3 Added 11/5   TRX lunges 10 x 3 Added 11/12   Stool scoots  Added 11/5        PRE     Extension  RANGE:   Flexion  RANGE:        Quantum machines     Leg press  Added 11/5   Leg extension    Leg curl Added 11/12        Manual interventions                 Therapeutic Exercise and NMR EXR  [x] (98240) Provided verbal/tactile cueing for activities related to strengthening, flexibility, endurance, ROM for improvements in LE, proximal hip, and core control with self care, mobility, lifting, ambulation.  [] (51291) Provided verbal/tactile cueing for activities related to improving balance, coordination, kinesthetic sense, posture, motor skill, proprioception  to assist with LE, proximal hip, and core control in self care, mobility, lifting, ambulation and eccentric single leg control.      NMR and Therapeutic Activities:    [] (96588 or 15866) Provided verbal/tactile cueing for activities related to improving balance, coordination, kinesthetic sense, posture, motor skill, proprioception and motor activation to allow for proper function of core, proximal hip and LE with self care and ADLs  [] (11895) Gait Re-education- Provided training and instruction to the patient for proper LE, core and proximal hip recruitment and positioning and eccentric body weight control with ambulation index score of 5% or less for the LEFS to assist with reaching prior level of function. - Not MET  2. Patient will demonstrate an increase in right hip (flex, ABD, and ext) and knee (flex and ext) strength to 4+/5 to allow for proper functional mobility as indicated by patients Functional Deficits. - Progressing towards  3. Patient will return to ADLs without increased symptoms or restriction. - Not MET   4. Patient will report kneeling at work pain free. - Not MET    Progression Towards Functional goals:  [x] Patient is progressing as expected towards functional goals listed. [] Progression is slowed due to complexities listed. [] Progression has been slowed due to co-morbidities. [] Plan just implemented, too soon to assess goals progression  [] Other:     ASSESSMENT:      Treatment/Activity Tolerance:  [x] Patient tolerated treatment well [] Patient limited by fatique  [] Patient limited by pain  [] Patient limited by other medical complications  [] Other: Pt returns after MRI results. He maintains knee flex ROM and hip and knee strength. He however continues to demonstrate min decreased form with DL squats. Recommend pt return to 1-2x/ week to progress strength training and begin functional training to return to PLOF.      12/17    Patient education: Patient education on PT and plan of care including diagnosis, prognosis, treatment goals and options. Patient agrees with discussed POC and treatment and is aware of rehab process.  10/24    Prognosis: [x] Good [] Fair  [] Poor    Patient Requires Follow-up: [x] Yes  [] No    PLAN: 1-2x/ week for 4 - 6 weeks (12/17 - 1/14)  [x] Continue per plan of care [] Alter current plan (see comments)  [] Plan of care initiated [] Hold pending MD visit [] Discharge    Next Session:     Electronically signed by: Bentley Connell PT, DPT

## 2018-12-18 ENCOUNTER — TELEPHONE (OUTPATIENT)
Dept: ORTHOPEDIC SURGERY | Age: 38
End: 2018-12-18

## 2018-12-19 ENCOUNTER — HOSPITAL ENCOUNTER (OUTPATIENT)
Dept: PHYSICAL THERAPY | Age: 38
Setting detail: THERAPIES SERIES
Discharge: HOME | End: 2018-12-19
Payer: COMMERCIAL

## 2018-12-19 NOTE — FLOWSHEET NOTE
Physical Therapy  Cancellation/No-show Note  Patient Name:  Leydi Payne  :  1980   Date:  2018  Cancelled visits to date: 01  No-shows to date: 0    For today's appointment patient:  [x]  Cancelled  []  Rescheduled appointment  []  No-show     Reason given by patient:  []  Patient ill  []  Conflicting appointment  []  No transportation    [x]  Conflict with work  []  No reason given  []  Other:     Comments: Pt cancelled due to work.       Electronically signed by:  Tim Hayward PT

## 2018-12-24 ENCOUNTER — HOSPITAL ENCOUNTER (OUTPATIENT)
Dept: PHYSICAL THERAPY | Age: 38
Setting detail: THERAPIES SERIES
Discharge: HOME | End: 2018-12-24
Payer: COMMERCIAL

## 2018-12-24 PROCEDURE — 97530 THERAPEUTIC ACTIVITIES: CPT | Performed by: PHYSICAL THERAPIST

## 2018-12-24 PROCEDURE — 97110 THERAPEUTIC EXERCISES: CPT | Performed by: PHYSICAL THERAPIST

## 2018-12-24 NOTE — FLOWSHEET NOTE
abd 4/5 4/5     Hip ext  4-/5 4+/5                     Functional Mobility/Transfers: Decreased ability to kneel      DL squat: min knee valgus first rep then corrected          RESTRICTIONS/PRECAUTIONS: None     Exercises/Interventions:   Exercise/Equipment Resistance/Repetitions Other comments   Bike 5'  Added 12/17   Stretching     Hamstring - seated 30 sec x 5 Added 10/24   Towel Pull     Inclined Calf     Hip Flexion     ITB     Groin     Quad -  prone 30 sec x 5 Added 10/24        SLR     Supine 10 x 3 #4 ^12/17   Abduction 10 x 3 #4 ^12/17   Adduction     Prone 10 x 3 #4 ^12/17   SLR+ 30 sec x 5 ^11/5        ROM     Sheet Pulls     Hang Weights          CKC     Calf raises  Added 10/29   Wall sits 15 sec x 5 with red loop TB with hip ABD  ^12/17   Step ups 10 x 3 green + pink ^12/17   1 leg stand 30 sec x 5, areomat ^12/24   Squatting     CC TKE     Balance     bridges     Clamshells 10 x 3, clamshells, red loop TB  ^10/31   Lateral band walks 35' x 4, red loop TB  Added 10/31   TRX squats 10 x 3 Added 11/5   TRX lunges 10 x 3 Added 11/12   Stool scoots  Added 11/5        PRE     Extension  RANGE:   Flexion  RANGE:        Quantum machines     Leg press  10 x 3 #130 ^12/24   Leg extension     Leg curl 10 x 3 #70 ^12/24        Manual interventions                 Therapeutic Exercise and NMR EXR  [x] (19367) Provided verbal/tactile cueing for activities related to strengthening, flexibility, endurance, ROM for improvements in LE, proximal hip, and core control with self care, mobility, lifting, ambulation.  [] (23158) Provided verbal/tactile cueing for activities related to improving balance, coordination, kinesthetic sense, posture, motor skill, proprioception  to assist with LE, proximal hip, and core control in self care, mobility, lifting, ambulation and eccentric single leg control.      NMR and Therapeutic Activities:    [] (41151 or ) Provided verbal/tactile cueing for activities related to improving towards    Therapist goals for Patient:   Short Term Goals: To be achieved in: 2 weeks  1. Independent in HEP and progression per patient tolerance, in order to prevent re-injury. - MET  2. Patient will have a decrease in pain to facilitate improvement in movement, function, and ADLs as indicated by Functional Deficits. - MET    Long Term Goals: To be achieved in: 4 weeks  1. Disability index score of 5% or less for the LEFS to assist with reaching prior level of function. - Not MET  2. Patient will demonstrate an increase in right hip (flex, ABD, and ext) and knee (flex and ext) strength to 4+/5 to allow for proper functional mobility as indicated by patients Functional Deficits. - Progressing towards  3. Patient will return to ADLs without increased symptoms or restriction. - Not MET   4. Patient will report kneeling at work pain free. - Not MET    Progression Towards Functional goals:  [x] Patient is progressing as expected towards functional goals listed. [] Progression is slowed due to complexities listed. [] Progression has been slowed due to co-morbidities. [] Plan just implemented, too soon to assess goals progression  [] Other:     ASSESSMENT:      Treatment/Activity Tolerance:  [x] Patient tolerated treatment well [] Patient limited by fatique  [] Patient limited by pain  [] Patient limited by other medical complications  [] Other: Pt shara session well. He shara increased strength, noted by increased resistance. He shara progression of SLS, reporting increased challenge with the areomat. 12/24    Patient education: Patient education on PT and plan of care including diagnosis, prognosis, treatment goals and options. Patient agrees with discussed POC and treatment and is aware of rehab process.  10/24    Prognosis: [x] Good [] Fair  [] Poor    Patient Requires Follow-up: [x] Yes  [] No    PLAN: 1-2x/ week for 4 - 6 weeks (12/17 - 1/14)  [x] Continue per plan of care [] Alter current plan (see

## 2018-12-26 ENCOUNTER — HOSPITAL ENCOUNTER (OUTPATIENT)
Dept: PHYSICAL THERAPY | Age: 38
Setting detail: THERAPIES SERIES
Discharge: HOME | End: 2018-12-26
Payer: COMMERCIAL

## 2018-12-26 PROCEDURE — 97110 THERAPEUTIC EXERCISES: CPT | Performed by: PHYSICAL THERAPIST

## 2018-12-26 PROCEDURE — 97530 THERAPEUTIC ACTIVITIES: CPT | Performed by: PHYSICAL THERAPIST

## 2019-01-03 ENCOUNTER — HOSPITAL ENCOUNTER (OUTPATIENT)
Dept: PHYSICAL THERAPY | Age: 39
Setting detail: THERAPIES SERIES
Discharge: HOME | End: 2019-01-03
Payer: COMMERCIAL

## 2019-01-07 ENCOUNTER — TELEPHONE (OUTPATIENT)
Dept: ORTHOPEDIC SURGERY | Age: 39
End: 2019-01-07

## 2019-01-07 ENCOUNTER — HOSPITAL ENCOUNTER (OUTPATIENT)
Dept: PHYSICAL THERAPY | Age: 39
Setting detail: THERAPIES SERIES
Discharge: HOME | End: 2019-01-07
Payer: COMMERCIAL

## 2019-01-07 PROCEDURE — 97112 NEUROMUSCULAR REEDUCATION: CPT | Performed by: PHYSICAL THERAPIST

## 2019-01-07 PROCEDURE — 97530 THERAPEUTIC ACTIVITIES: CPT | Performed by: PHYSICAL THERAPIST

## 2019-01-07 PROCEDURE — 97110 THERAPEUTIC EXERCISES: CPT | Performed by: PHYSICAL THERAPIST

## 2019-01-09 ENCOUNTER — HOSPITAL ENCOUNTER (OUTPATIENT)
Dept: PHYSICAL THERAPY | Age: 39
Setting detail: THERAPIES SERIES
Discharge: HOME | End: 2019-01-09
Payer: COMMERCIAL

## 2019-01-09 PROCEDURE — 97110 THERAPEUTIC EXERCISES: CPT | Performed by: PHYSICAL THERAPIST

## 2019-01-09 PROCEDURE — 97530 THERAPEUTIC ACTIVITIES: CPT | Performed by: PHYSICAL THERAPIST

## 2019-01-15 ENCOUNTER — OFFICE VISIT (OUTPATIENT)
Dept: ORTHOPEDIC SURGERY | Age: 39
End: 2019-01-15
Payer: COMMERCIAL

## 2019-01-15 ENCOUNTER — HOSPITAL ENCOUNTER (OUTPATIENT)
Dept: PHYSICAL THERAPY | Age: 39
Setting detail: THERAPIES SERIES
Discharge: HOME | End: 2019-01-15
Payer: COMMERCIAL

## 2019-01-15 VITALS
BODY MASS INDEX: 33.35 KG/M2 | DIASTOLIC BLOOD PRESSURE: 85 MMHG | SYSTOLIC BLOOD PRESSURE: 119 MMHG | HEIGHT: 68 IN | HEART RATE: 89 BPM | WEIGHT: 220.02 LBS

## 2019-01-15 DIAGNOSIS — M25.561 RIGHT KNEE PAIN, UNSPECIFIED CHRONICITY: ICD-10-CM

## 2019-01-15 DIAGNOSIS — M54.50 LUMBAR SPINE PAIN: Primary | ICD-10-CM

## 2019-01-15 DIAGNOSIS — R20.0 NUMBNESS OF RIGHT ANTERIOR THIGH: ICD-10-CM

## 2019-01-15 DIAGNOSIS — M70.41 PREPATELLAR BURSITIS OF RIGHT KNEE: ICD-10-CM

## 2019-01-15 DIAGNOSIS — M17.11 PRIMARY OSTEOARTHRITIS OF RIGHT KNEE: ICD-10-CM

## 2019-01-15 DIAGNOSIS — M22.41 CHONDROMALACIA PATELLAE OF RIGHT KNEE: ICD-10-CM

## 2019-01-15 DIAGNOSIS — M65.9 SYNOVITIS OF RIGHT KNEE: ICD-10-CM

## 2019-01-15 PROCEDURE — G8979 MOBILITY GOAL STATUS: HCPCS | Performed by: PHYSICAL THERAPIST

## 2019-01-15 PROCEDURE — 97530 THERAPEUTIC ACTIVITIES: CPT | Performed by: PHYSICAL THERAPIST

## 2019-01-15 PROCEDURE — 97110 THERAPEUTIC EXERCISES: CPT | Performed by: PHYSICAL THERAPIST

## 2019-01-15 PROCEDURE — G8978 MOBILITY CURRENT STATUS: HCPCS | Performed by: PHYSICAL THERAPIST

## 2019-01-15 PROCEDURE — G8980 MOBILITY D/C STATUS: HCPCS | Performed by: PHYSICAL THERAPIST

## 2019-01-15 PROCEDURE — 99214 OFFICE O/P EST MOD 30 MIN: CPT | Performed by: FAMILY MEDICINE

## 2019-01-15 RX ORDER — METHYLPREDNISOLONE 4 MG/1
TABLET ORAL
Qty: 21 KIT | Refills: 0 | Status: SHIPPED | OUTPATIENT
Start: 2019-01-15 | End: 2019-04-11 | Stop reason: ALTCHOICE

## 2019-04-11 ENCOUNTER — OFFICE VISIT (OUTPATIENT)
Dept: ORTHOPEDIC SURGERY | Age: 39
End: 2019-04-11
Payer: COMMERCIAL

## 2019-04-11 ENCOUNTER — TELEPHONE (OUTPATIENT)
Dept: ORTHOPEDIC SURGERY | Age: 39
End: 2019-04-11

## 2019-04-11 VITALS
SYSTOLIC BLOOD PRESSURE: 126 MMHG | BODY MASS INDEX: 32.58 KG/M2 | DIASTOLIC BLOOD PRESSURE: 82 MMHG | HEART RATE: 72 BPM | HEIGHT: 68 IN | WEIGHT: 215 LBS

## 2019-04-11 DIAGNOSIS — M50.30 DDD (DEGENERATIVE DISC DISEASE), CERVICAL: ICD-10-CM

## 2019-04-11 DIAGNOSIS — M19.011 PRIMARY OSTEOARTHRITIS OF RIGHT SHOULDER: ICD-10-CM

## 2019-04-11 DIAGNOSIS — M67.911 TENDINOPATHY OF RIGHT ROTATOR CUFF: ICD-10-CM

## 2019-04-11 DIAGNOSIS — M79.601 RIGHT ARM PAIN: ICD-10-CM

## 2019-04-11 DIAGNOSIS — M75.41 SHOULDER IMPINGEMENT, RIGHT: ICD-10-CM

## 2019-04-11 DIAGNOSIS — M25.511 CHRONIC RIGHT SHOULDER PAIN: Primary | ICD-10-CM

## 2019-04-11 DIAGNOSIS — M54.2 NECK PAIN: ICD-10-CM

## 2019-04-11 DIAGNOSIS — G89.29 CHRONIC RIGHT SHOULDER PAIN: Primary | ICD-10-CM

## 2019-04-11 PROCEDURE — 99214 OFFICE O/P EST MOD 30 MIN: CPT | Performed by: FAMILY MEDICINE

## 2019-04-11 RX ORDER — METHYLPREDNISOLONE 4 MG/1
TABLET ORAL
Qty: 21 KIT | Refills: 0 | Status: SHIPPED | OUTPATIENT
Start: 2019-04-11 | End: 2019-07-16 | Stop reason: ALTCHOICE

## 2019-04-11 RX ORDER — DICLOFENAC SODIUM 75 MG/1
75 TABLET, DELAYED RELEASE ORAL 2 TIMES DAILY
Qty: 60 TABLET | Refills: 3 | Status: SHIPPED | OUTPATIENT
Start: 2019-04-11 | End: 2021-02-09

## 2019-04-11 NOTE — PATIENT INSTRUCTIONS
If you're currently taking an anti-inflammatory such as advil, aleve, ibuprofen, diclofenac, naproxen, meloxicam, celebrex, or nabumetone, please stop. Take Medrol first for 6 days. This is a steroid pack. Flip the package over to the foil side and the directions will tell you to start with 6 pills the first day, 5 pills the second day, etc. Please do not take any other anti-inflammatories with the medrol dose marimar as this can upset your stomach. If something else is needed, you may take extra strength tylenol.      Once you are finished with the medrol, then you may re-start or start your anti-inflammatory: Diclofenac 2x/day

## 2019-04-11 NOTE — PROGRESS NOTES
Some discomfort with glenohumeral grind and labral testing. Negative apprehension testing. He does complain of discomfort with axial load testing and right-sided cervical and trapezial discomfort with Spurling's testing. Skin: There are no rashes, ulcerations or lesions. Distal motor sensory and vascular exam is intact. Gait: Fluid smooth gait. Reflexes:  Symmetrically preserved. Additional Comments: Cervical spine evaluation does reveal reasonable cervical motion with intact isometric testing. Once again pain noted with axial load testing and right-sided cervical and trapezial pain with Spurling's testing. This does not radiate pain into his upper extremity. There does not appear to be focal upper extremity motor deficits and sensory exam and DTRs appear to be preserved. Additional Examinations  Contralateral exam:Examination of the left shoulder reveals no atrophy or deformity. The skin is warm and dry. Range of motion is within normal limits. There is no focal tenderness with palpation. No AC joint tenderness. Negative Neer's and Cuevas-Don exams. Strength is graded 5/5 throughout. Right Upper Extremity:  Examination of the right upper extremity does not show any tenderness, deformity or injury. Range of motion is unremarkable. There is no gross instability. There are no rashes, ulcerations or lesions. Strength and tone are normal.  Left Upper Extremity: Examination of the left upper extremity does not show any tenderness, deformity or injury. Range of motion is unremarkable. There is no gross instability. There are no rashes, ulcerations or lesions. Strength and tone are normal.         Diagnostic Test Findings:   Right shoulder true AP outlet and axillary films are obtained today and show on the moderate a.c. degenerative changes with downsloping to his acromion and minimal inferior glenoid spurring. No evidence of humeral head elevation.   AP and lateral cervical spine films were obtained today and show loss of cervical lordosis with degenerative just changes at C5 6 and C6 7 without evidence of fracture or high-grade spondylolisthesis. Assessment:  #1.  4 weeks status post progressively worsening right shoulder pain with mild to moderate a.c. and mild glenohumeral osteoarthritis with cuff tendinopathy and clinical impingement with shoulder weakness. #2.  4 weeks status post aggravation cervical degenerative disc disease with cervical myofascial pain and infrequent episodic right arm numbness and tingling. Impression:    Encounter Diagnoses   Name Primary?  Chronic right shoulder pain Yes    Neck pain     DDD (degenerative disc disease), cervical     Primary osteoarthritis of right shoulder     Right arm pain     Tendinopathy of right rotator cuff     Shoulder impingement, right        Office Procedures:     Orders Placed This Encounter   Procedures    XR SHOULDER RIGHT (MIN 2 VIEWS)     3V Rt Shoulder     Standing Status:   Future     Number of Occurrences:   1     Standing Expiration Date:   4/11/2020     Order Specific Question:   Reason for exam:     Answer:   Shoulder Pain    XR CERVICAL SPINE (2-3 VIEWS)     Standing Status:   Future     Number of Occurrences:   1     Standing Expiration Date:   4/11/2020     Order Specific Question:   Reason for exam:     Answer:   PAIN    MRI Shoulder Right WO Contrast     Standing Status:   Future     Standing Expiration Date:   5/11/2019     Scheduling Instructions:      [facility] [place] [address]      [phone]            AUTH #:      TIME AND DATE TBD      PLEASE CALL PATIENT ONCE APPROVED TO SCHEDULE             Remember that it may take several business days to pre-cert your MRI through your insurance. Our office will contact you as soon as we have the approval.             We will not give any test results over the phone.  Please

## 2019-04-11 NOTE — TELEPHONE ENCOUNTER
Spoke to patient and informed them that their MRI has been authorized and that they can call and schedule scan at their convenience. Also told them that they can call and schedule a f/u with Dr. Bhaskar Flanagan once they have MRI scheduled, leaving at least 2-3 days for our office to receive their results.

## 2019-04-18 ENCOUNTER — OFFICE VISIT (OUTPATIENT)
Dept: ORTHOPEDIC SURGERY | Age: 39
End: 2019-04-18
Payer: COMMERCIAL

## 2019-04-18 VITALS
BODY MASS INDEX: 32.58 KG/M2 | WEIGHT: 215 LBS | SYSTOLIC BLOOD PRESSURE: 107 MMHG | DIASTOLIC BLOOD PRESSURE: 70 MMHG | HEART RATE: 73 BPM | HEIGHT: 68 IN

## 2019-04-18 DIAGNOSIS — M67.911 TENDINOPATHY OF RIGHT ROTATOR CUFF: ICD-10-CM

## 2019-04-18 DIAGNOSIS — M24.111 LABRAL TEAR OF SHOULDER, DEGENERATIVE, RIGHT: ICD-10-CM

## 2019-04-18 DIAGNOSIS — M19.019 AC JOINT ARTHROPATHY: ICD-10-CM

## 2019-04-18 DIAGNOSIS — M79.601 RIGHT ARM PAIN: ICD-10-CM

## 2019-04-18 DIAGNOSIS — M25.511 CHRONIC RIGHT SHOULDER PAIN: Primary | ICD-10-CM

## 2019-04-18 DIAGNOSIS — G89.29 CHRONIC RIGHT SHOULDER PAIN: Primary | ICD-10-CM

## 2019-04-18 DIAGNOSIS — M75.41 SHOULDER IMPINGEMENT, RIGHT: ICD-10-CM

## 2019-04-18 DIAGNOSIS — M50.30 DDD (DEGENERATIVE DISC DISEASE), CERVICAL: ICD-10-CM

## 2019-04-18 PROBLEM — M25.811 SHOULDER IMPINGEMENT, RIGHT: Status: ACTIVE | Noted: 2019-04-18

## 2019-04-18 PROCEDURE — 20605 DRAIN/INJ JOINT/BURSA W/O US: CPT | Performed by: FAMILY MEDICINE

## 2019-04-18 PROCEDURE — 99214 OFFICE O/P EST MOD 30 MIN: CPT | Performed by: FAMILY MEDICINE

## 2019-04-18 PROCEDURE — 20610 DRAIN/INJ JOINT/BURSA W/O US: CPT | Performed by: FAMILY MEDICINE

## 2019-04-18 RX ORDER — BETAMETHASONE SODIUM PHOSPHATE AND BETAMETHASONE ACETATE 3; 3 MG/ML; MG/ML
18 INJECTION, SUSPENSION INTRA-ARTICULAR; INTRALESIONAL; INTRAMUSCULAR; SOFT TISSUE ONCE
Status: COMPLETED | OUTPATIENT
Start: 2019-04-18 | End: 2019-04-18

## 2019-04-18 RX ADMIN — BETAMETHASONE SODIUM PHOSPHATE AND BETAMETHASONE ACETATE 18 MG: 3; 3 INJECTION, SUSPENSION INTRA-ARTICULAR; INTRALESIONAL; INTRAMUSCULAR; SOFT TISSUE at 10:15

## 2019-04-18 NOTE — PROGRESS NOTES
Chief Complaint    Shoulder Pain (follow up right shoulder -- MRI results)    Follow up chronic right shoulder and cervical pain. Review of right shoulder imaging    History of Present Illness:  Leydi Payne is a 34 y.o. male who is a very pleasant active white male  for [employer] which is a fairly manual job is a patient of  Dr Nancy Sewell who is being seen today upon self-referral for evaluation of an orthopedic condition to his right shoulder and right cervical spine. He has been seen in the past for his mechanical back pain and right knee arthritis and is doing reasonably well with both conditions. He is not complaining of high-grade right radicular symptoms most continue with his home-based exercises. He is being seen today for evaluation of progressively worsening right shoulder pain and stiffness to the right side of the cervical spine. He states that he has had episodic pain with active use his right shoulder for the past 3-4 years but does not recall specific history of actual injury. Since roughly mid March 2019 he has had more consistent pain to his right shoulder superiorly and posteriorly with some pain laterally most associated with overhead activity reaching away from his body as well as reaching cross body internal rotation does feel weak and stiff. He has had a history of popping in his shoulder. He does not recall any specific history of actual injury but once again his job is fairly manual he does do frequent yardwork. He has progressed the point where he is having a consistent achy pain ranges between a 5-9 out of 10 and is having difficulty sleeping. He does have weakness. He does have soreness and stiffness of the right service cervical spine with occasional episodic infrequent numbness to the right upper extremity. He has continued to take his diclofenac 75 mg 1 pill twice daily but has had no additional treatment or imaging.   He is not really complaining of true active locking or catching but does have weakness to his shoulder. He has continued to work and is being seen today for orthopedic and sports consultation with imaging. He was seen initially in the office on 4/11/2019 was tentatively diagnosed with right shoulder mild glenohumeral osteoarthritis with a.c. arthropathy clinical impingement and suspected cuff tendinopathy and sent for an MRI. He is also having some cervical myofascial pain and was found to have underlying cervical degenerative disc disease. On the Medrol Wade he reported about 40-50% improvement. He is now taking his diclofenac twice daily and his symptoms have slid back to some degree. He has been able to work. His MRI was obtained at [facility] on 4/16/2019 and did show evidence of chronic labral tearing with probably related to impingement secondary to a.c. joint per atrophy and a downsloping type II acromion and a.c. arthrosis. He did have moderate cuff tendinopathy and tendinitis with some interstitial tearing but no evidence of full-thickness rotator cuff tearing was noted. He did have biceps tendon tendinopathy which may be evolving into a chronic split tear. He does believe anti-inflammatories as helped his cervical soreness and stiffness and once again does have very intermittent achiness and numbness to the right arm and is not having focal motor deficits. He does seem to be sleeping better at night. He would like to avoid surgery if possible. Medical History  Patient's medications, allergies, past medical, surgical, social and family histories were reviewed and updated as appropriate. Review of Systems  Relevant review of systems reviewed on 4/11/2019 and available in the patient's chart under the medial tab.        Vital Signs  Vitals:    04/18/19 0947   BP: 107/70   Pulse: 73         General Exam:   Constitutional: Patient is adequately groomed with no evidence of malnutrition  DTRs: Deep tendon right-sided cervical and trapezial pain with Spurling's testing. This does not radiate pain into his upper extremity. There does not appear to be focal upper extremity motor deficits and sensory exam and DTRs appear to be preserved. Additional Examinations  Contralateral exam:Examination of the left shoulder reveals no atrophy or deformity. The skin is warm and dry. Range of motion is within normal limits. There is no focal tenderness with palpation. No AC joint tenderness. Negative Neer's and Cuevas-Don exams. Strength is graded 5/5 throughout. Right Upper Extremity:  Examination of the right upper extremity does not show any tenderness, deformity or injury. Range of motion is unremarkable. There is no gross instability. There are no rashes, ulcerations or lesions. Strength and tone are normal.  Left Upper Extremity: Examination of the left upper extremity does not show any tenderness, deformity or injury. Range of motion is unremarkable. There is no gross instability. There are no rashes, ulcerations or lesions. Strength and tone are normal.         Diagnostic Test Findings:   Right shoulder MRI obtained 2019 as listed above  Narrative   Site: [facility] Rad #: [id number] #: [id number] Procedure: MR Right Shoulder joint w/o Contrast ; Reason for Exam: r/o rotator cuff tear, evaluate oa, impingement. chronic pain, primary osteoarthritis, tendinopathy of rotator cuff   This document is confidential medical information.  Unauthorized disclosure or use of this information is prohibited by law. If you are not the intended recipient of this document, please advise us by calling immediately [phone].       [facility] - [address] [place] [address]           Patient Name: Frandy Caro   Case ID: [id number]   Patient : 1980   Referring Physician: Umer Sidhu MD   Exam Date: 2019   Exam Description: MR Right Shoulder joint w/o Arcuate segment biceps long head tendinopathy.  Subtle intrasubstance signal change within    the arcuate segment as it exits the joint relates to evolving or coalescing intrasubstance    longitudinal split tearing.  Tendon is perched on a hypertrophied lesser tuberosity at the site    of subscapularis injury and pulley injury. 5. Please see above.       Thank you for the opportunity to provide your interpretation.               Ashlie Dick MD       A: Rahul 04/16/2019 9:35 AM   T: [place] 04/16/2019 9:28 AM                Assessment:  #1.  5 weeks status post progressively worsening right shoulder pain with mild to moderate a.c. and mild glenohumeral osteoarthritis with cuff tendinopathy and clinical impingement with shoulder weakness. #2.  5 weeks status post aggravation cervical degenerative disc disease with cervical myofascial pain and infrequent episodic right arm numbness and tingling. Impression:    Encounter Diagnoses   Name Primary?  Chronic right shoulder pain Yes    Tendinopathy of right rotator cuff     Shoulder impingement, right     DDD (degenerative disc disease), cervical     Right arm pain        Office Procedures:     No orders of the defined types were placed in this encounter. Treatment Plan:  Treatment options were discussed with Pako Mcgee today. We reviewed his imaging of his right shoulder is previous plain films of the cervical spine. I do think his shoulder is his primary pain generator does have documented tendinopathy and impingement with chronic labral tearing and tendinopathy. We discussed both operative and nonoperative interventions and he would like to try conservative treatment initially which I think is advisable. We did perform a right shoulder subacromial injection today using 2 mL of Celestone, 4 mL of Marcaine, 3 mL of Xylocaine. We also performed a right shoulder a.c. injection using 1 mL of Celestone, 1 mL of Marcaine, 1 mL of Xylocaine.   I would like for him to continue with his diclofenac 75 mg 1 pill twice daily will started physical therapy. He was made aware that we may eventually need to do an MRI of the cervical spine and possibly her right upper extremity EMG. He does believe he is capable of working but will try to avoid repetitive overhead activity. Icing and activity modification was discussed. We will see him back in 3-4 weeks for reevaluation to see how he is progressing in therapy. Once again he would like to avoid surgical intervention if at all possible. .            This dictation was performed with a verbal recognition program (DRAGON) and it was checked for errors. It is possible that there are still dictated errors within this office note. If so, please bring any errors to my attention for an addendum. All efforts were made to ensure that this office note is accurate.

## 2019-04-24 ENCOUNTER — HOSPITAL ENCOUNTER (OUTPATIENT)
Dept: PHYSICAL THERAPY | Age: 39
Setting detail: THERAPIES SERIES
Discharge: HOME | End: 2019-04-24
Payer: COMMERCIAL

## 2019-04-24 PROCEDURE — 97161 PT EVAL LOW COMPLEX 20 MIN: CPT

## 2019-04-24 PROCEDURE — 97530 THERAPEUTIC ACTIVITIES: CPT

## 2019-04-24 PROCEDURE — 97110 THERAPEUTIC EXERCISES: CPT

## 2019-04-24 NOTE — PLAN OF CARE
[facility], [address] [place], [address]  Phone: [phone]   Fax: [phone]          Physical Therapy Certification    Dear Referring Practitioner: Anuja Das,    We had the pleasure of evaluating the following patient for physical therapy services at [address] [place] [address]. A summary of our findings can be found in the initial assessment below. This includes our plan of care. If you have any questions or concerns regarding these findings, please do not hesitate to contact me at the office phone number checked above. Thank you for the referral.       Physician Signature:_______________________________Date:__________________  By signing above (or electronic signature), therapists plan is approved by physician      Patient: Deuce Alford   : 1980   MRN: [id number]  Referring Physician: Referring Practitioner: Anuja Das      Evaluation Date: 2019      Medical Diagnosis Information:  Diagnosis: M75.41 (ICD-10-CM) - Shoulder impingement, right   Treatment Diagnosis: M25.511, G89.29 (ICD-10-CM) - Chronic right shoulder pain                                           Precautions/ Contra-indications:   Latex Allergy:  ?NO      ? YES  Preferred Language for Healthcare:   ?English       ? other:    SUBJECTIVE:    Per MD note : He was seen initially in the office on 2019 was tentatively diagnosed with right shoulder mild glenohumeral osteoarthritis with a.c. arthropathy clinical impingement and suspected cuff tendinopathy and sent for an MRI. He is also having some cervical myofascial pain and was found to have underlying cervical degenerative disc disease. On the Medrol Wade he reported about 40-50% improvement. He is now taking his diclofenac twice daily and his symptoms have slid back to some degree. He has been able to work.   His MRI was obtained at [facility] on 2019 and did show evidence of chronic labral tearing with probably related to impingement secondary to a.c. joint per atrophy and a downsloping type II acromion and a.c. arthrosis. He did have moderate cuff tendinopathy and tendinitis with some interstitial tearing but no evidence of full-thickness rotator cuff tearing was noted. He did have biceps tendon tendinopathy which may be evolving into a chronic split tear. He does believe anti-inflammatories as helped his cervical soreness and stiffness and once again does have very intermittent achiness and numbness to the right arm and is not having focal motor deficits. He does seem to be sleeping better at night. He would like to avoid surgery if possible. Today: pt received cortisone injection on 4/18. Pt notes that it took away most of the pain, will still have pain after a long day at 3/10. He does note that lifting over head is still the most problematic. He also feels stiff with reaching across body and behind back. He does note that he has issues getting to sleep because of pain and he has been waking up 3-4x/night d/t pain if he rolls onto right side. He will have some discomfort while driving if arm up on wheel for long periods of time. Relevant Medical History: hx of shoulder injury to right side in hs otherwise nonsignificant   Functional Disability Index: UEFI    Pain Scale: 3/10 at end of day   Easing factors: cortisone injection   Provocative factors: reaching overhead, behind back      Type: ?Constant   ? Intermittent  ? Radiating ? Localized ? other:     Numbness/Tingling: \"asleep\" feeling in hand after sleeping with arm under head or reaching overhead for long periods of time. Functional Limitations/Impairments: x? Lifting/reaching ? Grooming x? Carrying    ? ADL's ? Driving ? Sports/Recreations   ? Other:    Occupation/School:      Living Status/Prior Level of Function: Independent with ADLs and IADLs, weightlifting,     OBJECTIVE:     CERV ROM     Cervical Flexion [facility] Cervical Extension WFL    Cervical SB Tight to L>R    Cervical rotation Tight to L>R    Reflexes/Sensation (myotomes/dermatomes): intact     Joint mobility: x? Normal    ?Hypo   ? Hyper    Palpation: no TTP     Functional Mobility/Transfers: wfl     Posture: rounded forward shoulders    Bandages/Dressings/Incisions: na    Gait: (include devices/WB status) wfl    Orthopedic Special Tests:     ROM PROM AROM  Comment    L R L R    Flexion   170 160    Abduction   170 155    ER   80 70    IR   55 45    Other        Other             Strength L R Comment   Flexion 5 4+ Increased winging on R with all planes resistance    Abduction 5 4; discomfort     ER 5 4+    IR 5 4+    Supraspinatus      Upper Trap      Lower Trap      Mid Trap      Rhomboids      Biceps      Triceps      Horizontal Abduction      Horizontal Adduction      Lats        Special Tests Left Right   Apley Scratch IR: to T6  ER:   Cross body: posterior opposite shoulder  IR: to L1-2; painful   ER:  Cross Body: anterior opposite shoulder; painful superior shoulder   Neer's     Full Can     Empty Can     Jimmie Rachele     Nerve Tension Testing     Speed's     Camacho's      Spurling's     Repeated Scaption                                       ? Patient history, allergies, meds reviewed. Medical chart reviewed. See intake form. Review Of Systems (ROS):  ?Performed Review of systems (Integumentary, CardioPulmonary, Neurological) by intake and observation. Intake form has been scanned into medical record. Patient has been instructed to contact their primary care physician regarding ROS issues if not already being addressed at this time. Co-morbidities/Complexities (which will affect course of rehabilitation): x? None           Arthritic conditions   ? Rheumatoid arthritis (M05.9)  ? Osteoarthritis (M19.91)   Cardiovascular conditions   ? Hypertension (I10)  ? Hyperlipidemia (E78.5)  ? Angina pectoris (I20)  ? Atherosclerosis (I70)   Musculoskeletal conditions   ? Disc pathology   ? Congenital spine pathologies   ? Prior surgical intervention  ? Osteoporosis (M81.8)  ? Osteopenia (M85.8)   Endocrine conditions   ? Hypothyroid (E03.9)  ? Hyperthyroid Gastrointestinal conditions   ? Constipation (G63.76)   Metabolic conditions   ? Morbid obesity (E66.01)  ? Diabetes type 1(E10.65) or 2 (E11.65)   ? Neuropathy (G60.9)     Pulmonary conditions   ? Asthma (J45)  ? Coughing   ? COPD (J44.9)   Psychological Disorders  ? Anxiety (F41.9)  ? Depression (F32.9)   ? Other:   ?Other:          Barriers to/and or personal factors that will affect rehab potential:            x  ? Age  ? Sex              ? Motivation/Lack of Motivation                        ? Co-Morbidities              ? Cognitive Function, education/learning barriers              ? Environmental, home barriers             x ?profession/work barriers: frequent overhead lifting/reaching  ? past PT/medical experience  ? other:       Falls Risk Assessment (30 days):   ? Falls Risk assessed and no intervention required. ? Falls Risk assessed and Patient requires intervention due to being higher risk   TUG score (>12s at risk):     ? Falls education provided, including       Functional Assessment:  PT G-Chace  Functional Assessment Tool Used: UEFI  Score: 19%   Functional Limitation: Carrying, moving and handling objects    ASSESSMENT:   Functional Impairments   ? Noted spinal or UE joint hypomobility   ? Noted spinal or UE joint hypermobility   x? Decreased UE functional ROM   x? Decreased UE functional strength   ? Abnormal reflexes/sensation/myotomal/dermatomal deficits   x? Decreased RC/scapular/core strength and neuromuscular control   ?other:      Functional Activity Limitations (from functional questionnaire and intake)   x? Reduced ability to tolerate prolonged functional positions   ? Reduced ability or difficulty with changes of positions or transfers between positions   ? Reduced ability to maintain good posture and demonstrate good body mechanics with sitting, bending, and lifting   x? Reduced ability or tolerance with driving and/or computer work   x? Reduced ability to sleep   x? Reduced ability to perform lifting, reaching, carrying tasks   ? Reduced ability to tolerate impact through UE   x? Reduced ability to reach behind back   ? Reduced ability to  or hold objects   ? Reduced ability to throw or toss an object   ? other:    Participation Restrictions   x? Reduced participation in self care activities   x? Reduced participation in home management activities   x? Reduced participation in work activities   x? Reduced participation in social activities. ?Reduced participation in sport/recreation activities. Classification:   ?Signs/symptoms consistent with post-surgical status including decreased ROM, strength and function. ? Signs/symptoms consistent with joint sprain/strain   x? Signs/symptoms consistent with shoulder impingement   x? Signs/symptoms consistent with shoulder/elbow/wrist tendinopathy   ? Signs/symptoms consistent with Rotator cuff tear   x? Signs/symptoms consistent with labral tear   ? Signs/symptoms consistent with postural dysfunction    ? Signs/symptoms consistent with Glenohumeral IR Deficit - <45 degrees   ? Signs/symptoms consistent with facet dysfunction of cervical/thoracic spine    ? Signs/symptoms consistent with pathology which may benefit from Dry needling     ?other:     Prognosis/Rehab Potential:      ?Excellent   x? Good    ? Fair   ? Poor    Tolerance of evaluation/treatment:    ?Excellent   x? Good    ? Fair   ? Poor    PLAN:  Frequency/Duration: 1-2 days per week for 4-6 Weeks:  INTERVENTIONS:  x? Therapeutic exercise including: strength training, ROM, for Upper extremity and core   x? NMR activation and proprioception for UE, scap and Core   x? Manual therapy as indicated for shoulder, scapula and spine to include: Dry Needling/IASTM, STM, PROM, Gr I-IV mobilizations, manipulation. x?  Modalities as needed that may include: thermal agents, E-stim, Biofeedback, US, iontophoresis as indicated  x? Patient education on joint protection, postural re-education, activity modification, progression of HEP. HEP instruction: The patient's home exercise program was reviewed and they were educated on appropriate frequency, duration and intensity. The enclosed activities were performed and new exercises were added to H.E. P. with detailed pictures included. (see scanned forms)    GOALS:  Patient stated goal: to reduce pain during work/daily life     Therapist goals for Patient:   Short Term Goals: To be achieved in: 2 weeks  1. Independent in HEP and progression per patient tolerance, in order to prevent re-injury. 2. Patient will have a decrease in pain to facilitate improvement in movement, function, and ADLs as indicated by Functional Deficits. Long Term Goals: To be achieved in: 4-6 weeks  1. Disability index score of 10% or less for the UEFI to assist with reaching prior level of function. 2. Patient will demonstrate increased AROM to 160+ flexion/abduction, 50+ IR to allow for proper joint functioning as indicated by patients Functional Deficits. 3. Patient will demonstrate an increase in Strength to 4+/5 or greater painfree to allow for proper functional mobility as indicated by patients Functional Deficits. 4. Patient will return to overhead lifting/reaching without increased symptoms or restriction. 5. Patient will sleep with less than 1-2 disturbances from pain throughout the night. Physical Therapy Evaluation Complexity Justification  ? A history of present problem with:  ? no personal factors and/or comorbidities that impact the plan of care; x?1-2 personal factors and/or comorbidities that impact the plan of care  ? 3 personal factors and/or comorbidities that impact the plan of care  ?  An examination of body systems using standardized tests and measures addressing any of the following: body structures and functions (impairments), activity limitations, and/or participation restrictions;:  x? a total of 1-2 or more elements   ? a total of 3 or more elements   ? a total of 4 or more elements   ? A clinical presentation with:  x? stable and/or uncomplicated characteristics   ? evolving clinical presentation with changing characteristics  ? unstable and unpredictable characteristics;   ? Clinical decision making of ? low, ? moderate, ? high complexity using standardized patient assessment instrument and/or measurable assessment of functional outcome. x? EVAL (LOW) 99434 (typically 20 minutes face-to-face)  ? EVAL (MOD) 44696 (typically 30 minutes face-to-face)  ? EVAL (HIGH) 59941 (typically 45 minutes face-to-face)  ?  RE-EVAL 68282    Electronically signed by:      Nely Figueroa, PT, DPT, Cert DN

## 2019-04-24 NOTE — FLOWSHEET NOTE
[facility], [place]      Physical Therapy Daily Treatment Note  Date:  2019    Patient Name:  Paris Carlson    :  1980  MRN: [id number]  Medical/Treatment Diagnosis Information:  · Diagnosis: M75.41 (ICD-10-CM) - Shoulder impingement, right  · Treatment Diagnosis: M25.511, G89.29 (ICD-10-CM) - Chronic right shoulder pain  Insurance/Certification information:  PT Insurance Information: [place] 30 vcy  Physician Information:  Referring Practitioner: Carlos Bills signed (Y/N):     Date of Patient follow up with Physician:     Functional Assessment:  19  Functional Assessment Tool Used: UEFI  Score: 19%   Functional Limitation: Carrying, moving and handling objects    Progress Note: ?  Yes  ? No  Next due by: Visit #10      Latex Allergy:  ?NO      ? YES  Preferred Language for Healthcare:   ?English       ? other:    Visit # Insurance Allowable   1 30     Pain level:  3/10     SUBJECTIVE:  See eval    OBJECTIVE: See eval   Observation:    Test measurements:      ROM PROM AROM  Comment     L R L R     Flexion     170 160     Abduction     170 155     ER     80 70     IR     55 45     Other             Other                    Strength L R Comment   Flexion 5 4+ Increased winging on R with all planes resistance    Abduction 5 4; discomfort      ER 5 4+     IR 5 4+     Supraspinatus         Upper Trap         Lower Trap         Mid Trap         Rhomboids         Biceps         Triceps         Horizontal Abduction         Horizontal Adduction         Lats                  RESTRICTIONS/PRECAUTIONS: labral tear, biceps tendonitis     Exercises/Interventions:   Exercise/Equipment Resistance/Repetitions Other comments   Aerobic Conditioning     Aerodyne          Stretching/PROM     Wand Er 10x10 90     Table Slides     Wall slides      UE [place]     Pulleys     Pendulum     BB IR 10x10    SL IR     Pec doorway stretch     CBA stretch 10x10     UT stretch     LS stretch Isometrics     Retraction          Weight shift     Flexion     Abduction     External Rotation     Internal Rotation     Biceps     Triceps          PRE's     Flexion     Abduction     External Rotation 2# 3x10 SL     Internal Rotation     Shrugs     EXT     Reverse Flys     Serratus 3x10     Horizontal Abd with ER     Biceps     Triceps     Retraction          Cable Column/Theraband     External Rotation     Internal Rotation     Shrugs     Lats     Ext     Flex     Scapular Retraction Blue 3x10     BIC     TRIC     PNF          Dynamic Stability          Plyoback                Therapeutic Exercise and NMR EXR  ? (41326) Provided verbal/tactile cueing for activities related to strengthening, flexibility, endurance, ROM  for improvements in scapular, scapulothoracic and UE control with self care, reaching, carrying, lifting, house/yardwork, driving/computer work. ? (29116) Provided verbal/tactile cueing for activities related to improving balance, coordination, kinesthetic sense, posture, motor skill, proprioception  to assist with  scapular, scapulothoracic and UE control with self care, reaching, carrying, lifting, house/yardwork, driving/computer work. Therapeutic Activities:    ? (10110 or 60428) Provided verbal/tactile cueing for activities related to improving balance, coordination, kinesthetic sense, posture, motor skill, proprioception and motor activation to allow for proper function of scapular, scapulothoracic and UE control with self care, carrying, lifting, driving/computer work.      Home Exercise Program:    ? (55853) Reviewed/Progressed HEP activities related to strengthening, flexibility, endurance, ROM of scapular, scapulothoracic and UE control with self care, reaching, carrying, lifting, house/yardwork, driving/computer work  ? (31688) Reviewed/Progressed HEP activities related to improving balance, coordination, kinesthetic sense, posture, motor skill, proprioception of scapular, scapulothoracic and UE control with self care, reaching, carrying, lifting, house/yardwork, driving/computer work      Manual Treatments:  PROM / STM / Oscillations-Mobs:  G-I, II, III, IV (PA's, Inf., Post.)  ? (90626) Provided manual therapy to mobilize soft tissue/joints of cervical/CT, scapular GHJ and UE for the purpose of modulating pain, promoting relaxation,  increasing ROM, reducing/eliminating soft tissue swelling/inflammation/restriction, improving soft tissue extensibility and allowing for proper ROM for normal function with self care, reaching, carrying, lifting, house/yardwork, driving/computer work    Modalities:      Charges:  Timed Code Treatment Minutes: 29   Total Treatment Minutes: 60     x? EVAL (LOW) 87684   ? EVAL (MOD) 08305   ? EVAL (HIGH) 10114   ? RE-EVAL   x? IY(12968) x   1  ? IONTO  ? NMR (24792) x     ? VASO  ? Manual (58278) x      ? Other:  x? TA x  1    ? Mech Traction (47762)  ? ES(attended) (44197)      ? ES (un) (50345):       GOALS:  Patient stated goal: to reduce pain during work/daily life     Therapist goals for Patient:   Short Term Goals: To be achieved in: 2 weeks  1. Independent in HEP and progression per patient tolerance, in order to prevent re-injury. 2. Patient will have a decrease in pain to facilitate improvement in movement, function, and ADLs as indicated by Functional Deficits. Long Term Goals: To be achieved in: 4-6 weeks  1. Disability index score of 10% or less for the UEFI to assist with reaching prior level of function. 2. Patient will demonstrate increased AROM to 160+ flexion/abduction, 50+ IR to allow for proper joint functioning as indicated by patients Functional Deficits. 3. Patient will demonstrate an increase in Strength to 4+/5 or greater painfree to allow for proper functional mobility as indicated by patients Functional Deficits. 4. Patient will return to overhead lifting/reaching without increased symptoms or restriction.    5. Patient will sleep with less than 1-2 disturbances from pain throughout the night. Progression Towards Functional goals:  ? Patient is progressing as expected towards functional goals listed. ? Progression is slowed due to complexities listed. ? Progression has been slowed due to co-morbidities. ? Plan just implemented, too soon to assess goals progression  ? Other:     ASSESSMENT:  See eval    Treatment/Activity Tolerance:  ? Patient tolerated treatment well ? Patient limited by fatique  ? Patient limited by pain  ? Patient limited by other medical complications  ? Other:     Prognosis: ? Good ? Fair  ? Poor    Patient Requires Follow-up: ? Yes  ? No    PLAN: See eval  ? Continue per plan of care ? Alter current plan (see comments)  ? Plan of care initiated ? Hold pending MD visit ?  Discharge    Electronically signed by:  Branden Yuan, PT, DPT, Cert DN

## 2019-04-30 ENCOUNTER — HOSPITAL ENCOUNTER (OUTPATIENT)
Dept: PHYSICAL THERAPY | Age: 39
Setting detail: THERAPIES SERIES
Discharge: HOME | End: 2019-04-30
Payer: COMMERCIAL

## 2019-04-30 PROCEDURE — 97110 THERAPEUTIC EXERCISES: CPT

## 2019-04-30 PROCEDURE — 97530 THERAPEUTIC ACTIVITIES: CPT

## 2019-04-30 NOTE — FLOWSHEET NOTE
[facility], [place]      Physical Therapy Daily Treatment Note  Date:  2019    Patient Name:  Louis Ambriz    :  1980  MRN: [id number]  Medical/Treatment Diagnosis Information:  · Diagnosis: M75.41 (ICD-10-CM) - Shoulder impingement, right  · Treatment Diagnosis: M25.511, G89.29 (ICD-10-CM) - Chronic right shoulder pain  Insurance/Certification information:  PT Insurance Information: [place] 30 vcy  Physician Information:  Referring Practitioner: Bernie Edwards of care signed (Y/N):     Date of Patient follow up with Physician:     Functional Assessment:  19  Functional Assessment Tool Used: UEFI  Score: 19%   Functional Limitation: Carrying, moving and handling objects    Progress Note: ?  Yes  ? No  Next due by: Visit #10      Latex Allergy:  ?NO      ? YES  Preferred Language for Healthcare:   ?English       ? other:    Visit # Insurance Allowable   2 30     Pain level:  3/10     SUBJECTIVE: Pt reports he feels slightly better, did have some pain when he woke up this morning.      OBJECTIVE: 19   Observation:    Test measurements:      ROM PROM AROM  Comment     L R L R     Flexion     170 160     Abduction     170 155     ER     80 70     IR     55 45     Other             Other                    Strength L R Comment   Flexion 5 4+ Increased winging on R with all planes resistance    Abduction 5 4; discomfort      ER 5 4+     IR 5 4+     Supraspinatus         Upper Trap         Lower Trap         Mid Trap         Rhomboids         Biceps         Triceps         Horizontal Abduction         Horizontal Adduction         Lats                  RESTRICTIONS/PRECAUTIONS: labral tear, biceps tendonitis     Exercises/Interventions:   Exercise/Equipment Resistance/Repetitions Other comments   Aerobic Conditioning     Aerodyne 2' x 2'          Stretching/PROM     Wand Er 10x10 90     Table Slides     Wall slides  Flexion 10x10     UE [place]     Pulleys     Pendulum SL IR 10x10    Pec doorway stretch 10x10 90-90     CBA stretch 10x10     UT stretch     LS stretch     Isometrics     Retraction          Weight shift     Flexion     Abduction     External Rotation     Internal Rotation     Biceps     Triceps          PRE's     Flexion     Abduction        Internal Rotation     Shrugs     EXT     Reverse Flys     Serratus 3x10 5#    Horizontal Abd with ER     Biceps     Triceps     Retraction     Prone T  3x10     Cable Column/Theraband     External Rotation 3x10 blue     Internal Rotation     Shrugs     Lats     Ext Blue 3x10     Flex     Scapular Retraction Blue 3x10     BIC     TRIC     PNF          Dynamic Stability          Plyoback                Therapeutic Exercise and NMR EXR  ? (78784) Provided verbal/tactile cueing for activities related to strengthening, flexibility, endurance, ROM  for improvements in scapular, scapulothoracic and UE control with self care, reaching, carrying, lifting, house/yardwork, driving/computer work. ? (50141) Provided verbal/tactile cueing for activities related to improving balance, coordination, kinesthetic sense, posture, motor skill, proprioception  to assist with  scapular, scapulothoracic and UE control with self care, reaching, carrying, lifting, house/yardwork, driving/computer work. Therapeutic Activities:    ? (04312 or 41556) Provided verbal/tactile cueing for activities related to improving balance, coordination, kinesthetic sense, posture, motor skill, proprioception and motor activation to allow for proper function of scapular, scapulothoracic and UE control with self care, carrying, lifting, driving/computer work.      Home Exercise Program:    ? (25680) Reviewed/Progressed HEP activities related to strengthening, flexibility, endurance, ROM of scapular, scapulothoracic and UE control with self care, reaching, carrying, lifting, house/yardwork, driving/computer work  ? (61245) Reviewed/Progressed HEP activities Deficits. 4. Patient will return to overhead lifting/reaching without increased symptoms or restriction. 5. Patient will sleep with less than 1-2 disturbances from pain throughout the night. Progression Towards Functional goals:  ? Patient is progressing as expected towards functional goals listed. ? Progression is slowed due to complexities listed. ? Progression has been slowed due to co-morbidities. ? Plan just implemented, too soon to assess goals progression  ? Other:     ASSESSMENT:  See eval    Treatment/Activity Tolerance:  ? Patient tolerated treatment well ? Patient limited by fatique  ? Patient limited by pain  ? Patient limited by other medical complications  ? Other: Pt able to increase scapular strengthening. Progressed some stretching which pt notes he can feel more than previous stretches. Continue to progress as tolerated. Prognosis: ? Good ? Fair  ? Poor    Patient Requires Follow-up: ? Yes  ? No    PLAN: See eval  ? Continue per plan of care ? Alter current plan (see comments)  ? Plan of care initiated ? Hold pending MD visit ?  Discharge    Electronically signed by:  Ayaz Carter, PT, DPT, Cert DN

## 2019-05-07 ENCOUNTER — HOSPITAL ENCOUNTER (OUTPATIENT)
Dept: PHYSICAL THERAPY | Age: 39
Setting detail: THERAPIES SERIES
Discharge: HOME | End: 2019-05-07
Payer: COMMERCIAL

## 2019-05-07 PROCEDURE — 97110 THERAPEUTIC EXERCISES: CPT

## 2019-05-07 PROCEDURE — 97530 THERAPEUTIC ACTIVITIES: CPT

## 2019-05-07 NOTE — FLOWSHEET NOTE
[facility], [place]      Physical Therapy Daily Treatment Note  Date:  2019    Patient Name:  Leydi Payne    :  1980  MRN: [id number]  Medical/Treatment Diagnosis Information:  · Diagnosis: M75.41 (ICD-10-CM) - Shoulder impingement, right  · Treatment Diagnosis: M25.511, G89.29 (ICD-10-CM) - Chronic right shoulder pain  Insurance/Certification information:  PT Insurance Information: [place] 30 vcy  Physician Information:  Referring Practitioner: Janet Trinidad of care signed (Y/N):     Date of Patient follow up with Physician:     Functional Assessment:  19  Functional Assessment Tool Used: UEFI  Score: 19%   Functional Limitation: Carrying, moving and handling objects    Progress Note: ?  Yes  ? No  Next due by: Visit #10      Latex Allergy:  ?NO      ? YES  Preferred Language for Healthcare:   ?English       ? other:    Visit # Insurance Allowable   2 30     Pain level:  3/10     SUBJECTIVE:  Pt reports that he has continued to have discomfort in the shoulder but still can not pinpoint specific activities that cause more discomfort. He reports that he is most sore mid day and then at night but takes diclofenac and does feel some improvement.       OBJECTIVE: 19   Observation:    Test measurements:      ROM PROM AROM  Comment     L R L R     Flexion     170 160     Abduction     170 155     ER     80 70     IR     55 45     Other             Other                    Strength L R Comment   Flexion 5 4+ Increased winging on R with all planes resistance    Abduction 5 4; discomfort      ER 5 4+     IR 5 4+     Supraspinatus         Upper Trap         Lower Trap         Mid Trap         Rhomboids         Biceps         Triceps         Horizontal Abduction         Horizontal Adduction         Lats                  RESTRICTIONS/PRECAUTIONS: labral tear, biceps tendonitis     Exercises/Interventions:   Exercise/Equipment Resistance/Repetitions Other comments   Aerobic Conditioning     Aerodyne 2' x 2'          Stretching/PROM     Wand Er 10x10 90     Table Slides     Wall slides  Flexion 10x10     UE [place]     Pulleys     Pendulum        SL IR 10x10    Pec doorway stretch 10x10 90-90     CBA stretch 10x10     UT stretch     LS stretch     Isometrics     Retraction          Weight shift     Flexion     Abduction     External Rotation     Internal Rotation     Biceps     Triceps          PRE's     Flexion     Abduction        Internal Rotation     Shrugs     EXT     Reverse Flys     Serratus 3x10 push up plus along wall     Horizontal Abd with ER     Biceps     Triceps     Retraction     Prone T  3x10     Cable Column/Theraband     External Rotation 3x10 black    Internal Rotation 3x10 blue     Shrugs     Lats     Ext Black 3x10     Flex     Scapular Retraction Black  3x10     BIC     TRIC     PNF D2 ext blue 3x10          Dynamic Stability     BOW 30x CW/CCW    Plyoback                Therapeutic Exercise and NMR EXR  ? (38636) Provided verbal/tactile cueing for activities related to strengthening, flexibility, endurance, ROM  for improvements in scapular, scapulothoracic and UE control with self care, reaching, carrying, lifting, house/yardwork, driving/computer work. ? (55773) Provided verbal/tactile cueing for activities related to improving balance, coordination, kinesthetic sense, posture, motor skill, proprioception  to assist with  scapular, scapulothoracic and UE control with self care, reaching, carrying, lifting, house/yardwork, driving/computer work. Therapeutic Activities:    ? (17493 or 32734) Provided verbal/tactile cueing for activities related to improving balance, coordination, kinesthetic sense, posture, motor skill, proprioception and motor activation to allow for proper function of scapular, scapulothoracic and UE control with self care, carrying, lifting, driving/computer work.      Home Exercise Program:    ? (96639) Reviewed/Progressed HEP proper joint functioning as indicated by patients Functional Deficits. 3. Patient will demonstrate an increase in Strength to 4+/5 or greater painfree to allow for proper functional mobility as indicated by patients Functional Deficits. 4. Patient will return to overhead lifting/reaching without increased symptoms or restriction. 5. Patient will sleep with less than 1-2 disturbances from pain throughout the night. Progression Towards Functional goals:  ? Patient is progressing as expected towards functional goals listed. ? Progression is slowed due to complexities listed. ? Progression has been slowed due to co-morbidities. ? Plan just implemented, too soon to assess goals progression  ? Other:     ASSESSMENT:  See eval    Treatment/Activity Tolerance:  ? Patient tolerated treatment well ? Patient limited by fatique  ? Patient limited by pain  ? Patient limited by other medical complications  ? Other: pt able to increase PREs and shoulder stability exercises today with fatigue but no increased pain. Continue to progress as tolerated. Prognosis: ? Good ? Fair  ? Poor    Patient Requires Follow-up: ? Yes  ? No    PLAN: See eval  ? Continue per plan of care ? Alter current plan (see comments)  ? Plan of care initiated ? Hold pending MD visit ?  Discharge    Electronically signed by:  Shelly Del Angel, PT, DPT, Cert DN

## 2019-05-14 ENCOUNTER — HOSPITAL ENCOUNTER (OUTPATIENT)
Dept: PHYSICAL THERAPY | Age: 39
Setting detail: THERAPIES SERIES
Discharge: HOME | End: 2019-05-14
Payer: COMMERCIAL

## 2019-05-14 PROCEDURE — 97110 THERAPEUTIC EXERCISES: CPT

## 2019-05-14 PROCEDURE — 97530 THERAPEUTIC ACTIVITIES: CPT

## 2019-05-14 NOTE — FLOWSHEET NOTE
Table Slides     Wall slides  Flexion 10x10     UE [place]     Pulleys     Pendulum        SL IR 10x10    Pec doorway stretch 10x10 90-90     CBA stretch 10x10     UT stretch     LS stretch     Isometrics     Retraction          Weight shift     Flexion     Abduction     External Rotation     Internal Rotation     Biceps     Triceps          PRE's     Flexion     Abduction        Internal Rotation     Shrugs     EXT     Reverse Flys     Serratus 3x10 push up plus along wall     Horizontal Abd with ER     Biceps     Triceps     Retraction     Prone T  3x10     Cable Column/Theraband     External Rotation 3x10 black    Internal Rotation 3x10 black    Shrugs     Lats     Ext Black 3x10     Flex     Scapular Retraction Black  3x10     BIC     TRIC     PNF D2 ext blue 3x10          Dynamic Stability     BOW 30x CW/CCW    IR/ER BB [id number]    Plyoback                Therapeutic Exercise and NMR EXR  ? (37755) Provided verbal/tactile cueing for activities related to strengthening, flexibility, endurance, ROM  for improvements in scapular, scapulothoracic and UE control with self care, reaching, carrying, lifting, house/yardwork, driving/computer work. ? (46960) Provided verbal/tactile cueing for activities related to improving balance, coordination, kinesthetic sense, posture, motor skill, proprioception  to assist with  scapular, scapulothoracic and UE control with self care, reaching, carrying, lifting, house/yardwork, driving/computer work. Therapeutic Activities:    ? (28844 or 45797) Provided verbal/tactile cueing for activities related to improving balance, coordination, kinesthetic sense, posture, motor skill, proprioception and motor activation to allow for proper function of scapular, scapulothoracic and UE control with self care, carrying, lifting, driving/computer work.      Home Exercise Program:    ? (17282) Reviewed/Progressed HEP activities related to strengthening, flexibility, endurance, ROM of scapular, scapulothoracic and UE control with self care, reaching, carrying, lifting, house/yardwork, driving/computer work  ? (50776) Reviewed/Progressed HEP activities related to improving balance, coordination, kinesthetic sense, posture, motor skill, proprioception of scapular, scapulothoracic and UE control with self care, reaching, carrying, lifting, house/yardwork, driving/computer work      Manual Treatments:  PROM / STM / Oscillations-Mobs:  G-I, II, III, IV (PA's, Inf., Post.)  ? (16215) Provided manual therapy to mobilize soft tissue/joints of cervical/CT, scapular GHJ and UE for the purpose of modulating pain, promoting relaxation,  increasing ROM, reducing/eliminating soft tissue swelling/inflammation/restriction, improving soft tissue extensibility and allowing for proper ROM for normal function with self care, reaching, carrying, lifting, house/yardwork, driving/computer work    Modalities:      Charges:  Timed Code Treatment Minutes: 53   Total Treatment Minutes: 55     ? EVAL (LOW) 38713   ? EVAL (MOD) 08245   ? EVAL (HIGH) 09691   ? RE-EVAL   x? YJ(04413) x   2  ? IONTO  ? NMR (39780) x     ? VASO  ? Manual (96030) x      ? Other:  x? TA x  2    ? Mech Traction (24676)  ? ES(attended) (58841)      ? ES (un) (24796):       GOALS:  Patient stated goal: to reduce pain during work/daily life     Therapist goals for Patient:   Short Term Goals: To be achieved in: 2 weeks  1. Independent in HEP and progression per patient tolerance, in order to prevent re-injury. 2. Patient will have a decrease in pain to facilitate improvement in movement, function, and ADLs as indicated by Functional Deficits. Long Term Goals: To be achieved in: 4-6 weeks  1. Disability index score of 10% or less for the UEFI to assist with reaching prior level of function.    2. Patient will demonstrate increased AROM to 160+ flexion/abduction, 50+ IR to allow for proper joint functioning as indicated by patients Functional Deficits. 3. Patient will demonstrate an increase in Strength to 4+/5 or greater painfree to allow for proper functional mobility as indicated by patients Functional Deficits. 4. Patient will return to overhead lifting/reaching without increased symptoms or restriction. 5. Patient will sleep with less than 1-2 disturbances from pain throughout the night. Progression Towards Functional goals:  ? Patient is progressing as expected towards functional goals listed. ? Progression is slowed due to complexities listed. ? Progression has been slowed due to co-morbidities. ? Plan just implemented, too soon to assess goals progression  ? Other:     ASSESSMENT:  See eval    Treatment/Activity Tolerance:  ? Patient tolerated treatment well ? Patient limited by fatique  ? Patient limited by pain  ? Patient limited by other medical complications  ? Other: pt shows improved strength and PROM but is still limited by pain in AROM flex/abd. He is able to complete exercises without increased discomfort. He will schedule follow up for next week withMD.   Prognosis: ? Good ? Fair  ? Poor    Patient Requires Follow-up: ? Yes  ? No    PLAN: See eval  ? Continue per plan of care ? Alter current plan (see comments)  ? Plan of care initiated ? Hold pending MD visit ?  Discharge    Electronically signed by:  Hailey Ambrose, PT, DPT, Cert DN

## 2019-05-21 ENCOUNTER — HOSPITAL ENCOUNTER (OUTPATIENT)
Dept: PHYSICAL THERAPY | Age: 39
Setting detail: THERAPIES SERIES
End: 2019-05-21
Payer: COMMERCIAL

## 2019-05-23 ENCOUNTER — OFFICE VISIT (OUTPATIENT)
Dept: ORTHOPEDIC SURGERY | Age: 39
End: 2019-05-23
Payer: COMMERCIAL

## 2019-05-23 VITALS
WEIGHT: 220 LBS | HEIGHT: 68 IN | HEART RATE: 75 BPM | SYSTOLIC BLOOD PRESSURE: 111 MMHG | DIASTOLIC BLOOD PRESSURE: 71 MMHG | BODY MASS INDEX: 33.34 KG/M2

## 2019-05-23 DIAGNOSIS — M24.111 LABRAL TEAR OF SHOULDER, DEGENERATIVE, RIGHT: ICD-10-CM

## 2019-05-23 DIAGNOSIS — M25.511 CHRONIC RIGHT SHOULDER PAIN: ICD-10-CM

## 2019-05-23 DIAGNOSIS — M67.911 TENDINOPATHY OF RIGHT ROTATOR CUFF: ICD-10-CM

## 2019-05-23 DIAGNOSIS — G89.29 CHRONIC RIGHT SHOULDER PAIN: ICD-10-CM

## 2019-05-23 DIAGNOSIS — M75.41 SHOULDER IMPINGEMENT, RIGHT: Primary | ICD-10-CM

## 2019-05-23 DIAGNOSIS — M19.019 AC JOINT ARTHROPATHY: ICD-10-CM

## 2019-05-23 DIAGNOSIS — M50.30 DDD (DEGENERATIVE DISC DISEASE), CERVICAL: ICD-10-CM

## 2019-05-23 PROCEDURE — 99213 OFFICE O/P EST LOW 20 MIN: CPT | Performed by: FAMILY MEDICINE

## 2019-05-23 RX ORDER — METHYLPREDNISOLONE 4 MG/1
TABLET ORAL
Qty: 21 KIT | Refills: 0 | Status: SHIPPED | OUTPATIENT
Start: 2019-05-23 | End: 2019-07-16 | Stop reason: ALTCHOICE

## 2019-05-23 NOTE — PROGRESS NOTES
Chief Complaint    Shoulder Pain (check right shoulder)    Follow up chronic right shoulder pain with chronic labral tearing before meals arthropathy with impingement with cuff tendinopathy and underlying cervical degenerative disc disease with cervical myofascial pain    History of Present Illness:  Mary Lou Dooley is a 34 y.o. male who is a very pleasant active white male  for [employer] which is a fairly manual job is a patient of  Dr Hanny Benjamin who is being seen today upon self-referral for evaluation of an orthopedic condition to his right shoulder and right cervical spine. He has been seen in the past for his mechanical back pain and right knee arthritis and is doing reasonably well with both conditions. He is not complaining of high-grade right radicular symptoms most continue with his home-based exercises. He is being seen today for evaluation of progressively worsening right shoulder pain and stiffness to the right side of the cervical spine. He states that he has had episodic pain with active use his right shoulder for the past 3-4 years but does not recall specific history of actual injury. Since roughly mid March 2019 he has had more consistent pain to his right shoulder superiorly and posteriorly with some pain laterally most associated with overhead activity reaching away from his body as well as reaching cross body internal rotation does feel weak and stiff. He has had a history of popping in his shoulder. He does not recall any specific history of actual injury but once again his job is fairly manual he does do frequent yardwork. He has progressed the point where he is having a consistent achy pain ranges between a 5-9 out of 10 and is having difficulty sleeping. He does have weakness. He does have soreness and stiffness of the right service cervical spine with occasional episodic infrequent numbness to the right upper extremity.   He has continued to take his diclofenac 75 mg 1 pill twice daily but has had no additional treatment or imaging. He is not really complaining of true active locking or catching but does have weakness to his shoulder. He has continued to work and is being seen today for orthopedic and sports consultation with imaging. He was seen initially in the office on 4/11/2019 was tentatively diagnosed with right shoulder mild glenohumeral osteoarthritis with a.c. arthropathy clinical impingement and suspected cuff tendinopathy and sent for an MRI. He is also having some cervical myofascial pain and was found to have underlying cervical degenerative disc disease. On the Medrol Wade he reported about 40-50% improvement. He is now taking his diclofenac twice daily and his symptoms have slid back to some degree. He has been able to work. His MRI was obtained at [facility] on 4/16/2019 and did show evidence of chronic labral tearing with probably related to impingement secondary to a.c. joint per atrophy and a downsloping type II acromion and a.c. arthrosis. He did have moderate cuff tendinopathy and tendinitis with some interstitial tearing but no evidence of full-thickness rotator cuff tearing was noted. He did have biceps tendon tendinopathy which may be evolving into a chronic split tear. He does believe anti-inflammatories as helped his cervical soreness and stiffness and once again does have very intermittent achiness and numbness to the right arm and is not having focal motor deficits. He does seem to be sleeping better at night. He would like to avoid surgery if possible. He was seen last in the office on 4/18/2019 was started on conservative treatment for her shoulder. Following his injection, he did have about 80% improvement in shoulder pain symptoms with this lasted only about 2-3 weeks. He has since returned back to his baseline. He has had supervised therapy and is working as a [employer] program daily basis.   Skin his job is fairly manual and they're in the middle of their busy time at work. There are also the process of packing up and moving as they do anticipate putting her house on the market on Alyce 3, 2019 and will be temporarily moving in with their parents. He is a reasonable bite with his home-based exercises. He has continued with his diclofenac. His pain symptoms does range between a 3-8 out of 10 and repetitive overhead activity and heavy lifting and reaching cross body is still painful. He does have very occasional catching but has never had locking. He does have frequent clicking. He does have less pain with sleeping and overall the cervical soreness has improved. No motor deficits. We previously discussed arthroscopic intervention but he would like to put this off until later in the year as they're once again in the process of moving and this is a busy time of the year at work. Medical History  Patient's medications, allergies, past medical, surgical, social and family histories were reviewed and updated as appropriate. Review of Systems  Relevant review of systems reviewed on 4/11/2019 and available in the patient's chart under the medial tab. Vital Signs  Vitals:    05/23/19 0847   BP: 111/71   Pulse: 75         General Exam:   Constitutional: Patient is adequately groomed with no evidence of malnutrition  DTRs: Deep tendon reflexes are intact  Mental Status: The patient is oriented to time, place and person. The patient's mood and affect are appropriate. Lymphatic: The lymphatic examination bilaterally reveals all areas to be without enlargement or induration. Vascular: Examination reveals no swelling or calf tenderness. Peripheral pulses are palpable and 2+. Neurological: The patient has good coordination. There is no weakness or sensory deficit. Right Shoulder Examination    Inspection:  There is no high-grade deformities although he does have minimal crepitation of his a.c. joint. There is no focal atrophy or evidence of effusion. Palpation:  He does have mild tenderness over the posterior cuff greater tuberosity and minimally over the a.c. joint. Rang of Motion:  He does complain of discomfort in the shoulder with abduction beyond 115 for flexion about 125 as well as the terminal tendon 15° of internal and external rotation. Strength:  He does have mild weakness at 4-4+ out of 5 with supra and infraspinatus testing which produces pain in the range of 5-6 out of 10. Discomfort with cross body adduction but subscap testing is 4+ out of 5. Special Tests:  Negative drop and liftoff testing. 6-7 out of 10 pain with impingement testing. He does have slightly less prominent pain with cross body adduction. Negative speed's testing. Some discomfort with glenohumeral grind and labral testing. Negative apprehension testing. He does complain of much less discomfort with axial load testing and right-sided cervical and trapezial discomfort with Spurling's testing. Skin: There are no rashes, ulcerations or lesions. Distal motor sensory and vascular exam is intact. Gait: Fluid smooth gait. Reflexes:  Symmetrically preserved. Additional Comments: Cervical spine evaluation does reveal reasonable cervical motion with intact isometric testing. Once again pain noted with axial load testing and right-sided cervical and trapezial pain with Spurling's testing. This does not radiate pain into his upper extremity. There does not appear to be focal upper extremity motor deficits and sensory exam and DTRs appear to be preserved. Additional Examinations  Contralateral exam:Examination of the left shoulder reveals no atrophy or deformity. The skin is warm and dry. Range of motion is within normal limits. There is no focal tenderness with palpation. No AC joint tenderness. Negative Neer's and Cuevas-Don exams.   Strength is graded 5/5 throughout. Right Upper Extremity:  Examination of the right upper extremity does not show any tenderness, deformity or injury. Range of motion is unremarkable. There is no gross instability. There are no rashes, ulcerations or lesions. Strength and tone are normal.  Left Upper Extremity: Examination of the left upper extremity does not show any tenderness, deformity or injury. Range of motion is unremarkable. There is no gross instability. There are no rashes, ulcerations or lesions. Strength and tone are normal.         Diagnostic Test Findings:   Right shoulder MRI obtained 2019 as listed above  Narrative   Site: [facility] Rad #: [id number] #: [id number] Procedure: MR Right Shoulder joint w/o Contrast ; Reason for Exam: r/o rotator cuff tear, evaluate oa, impingement. chronic pain, primary osteoarthritis, tendinopathy of rotator cuff   This document is confidential medical information.  Unauthorized disclosure or use of this information is prohibited by law. If you are not the intended recipient of this document, please advise us by calling immediately [phone].       [facility] - [address] [place] [address]           Patient Name: Radha Yuan   Case ID: [id number]   Patient : 1980   Referring Physician: Hansa Parikh MD   Exam Date: 2019   Exam Description: MR Right Shoulder joint w/o Contrast            HISTORY:  Evaluate rotator cuff tear, evaluate OA, impingement.  Chronic pain, primary    osteoarthritis, tendinopathy of rotator cuff.       TECHNICAL FACTORS:  Long- and short-axis fat- and water-weighted images were performed.  1.5T    High-Field Oval.       COMPARISON:  None.       FINDINGS:  Arcuate and extraarticular segment biceps long head tendinopathy.  Abnormal signal    within the arcuate segment relates to evolving intrasubstance interstitial tear.       Posterior and inferior labral tear without displacement.  Regions of slit-like paralabral cysts    are present.  The labral tear is nearly circumferential.       Mild-to-moderate cuff tendinopathy and peritendinobursitis.  Interstitial signal change    involves the distal subscapularis as well as the supraspinatus.  Partial-thickness regions of    interstitial tearing are present.  Traction-related bony hypertrophy involves the lesser    tuberosity.       Outlet-related cuff impingement secondary to [facility] joint hypertrophy and a lateral downsloping    hypertrophied type 2 acromion.  AC arthrosis is contributory.       CONCLUSION:   1. Regions of nondisplaced posterior, inferior, posterior and superior labral tearing with tiny    foci of paralabral cysts dissect along the posterior and inferior labrum.  The labral tear is    nearly circumferential.   2.  Outlet-related cuff impingement secondary to [facility] joint hypertrophy and a lateral downsloping    hypertrophied type 2 acromion.  AC arthrosis and capsulitis is contributory. 3. Moderate cuff tendinopathy and peritendinobursitis.  Regions of interstitial concealed    tearing and possibly articular surface tearing involve the distal subscapularis and less so    supraspinatus.  Frayed distal infraspinatus is also present.  No full-thickness rotator cuff    tear.  Regions of cuff tearing occupy approximately 25% of the distal depth of the    subscapularis and less so supraspinatus. 4. Arcuate segment biceps long head tendinopathy.  Subtle intrasubstance signal change within    the arcuate segment as it exits the joint relates to evolving or coalescing intrasubstance    longitudinal split tearing.  Tendon is perched on a hypertrophied lesser tuberosity at the site    of subscapularis injury and pulley injury.    5. Please see above.       Thank you for the opportunity to provide your interpretation.               Jayson Gottron, MD       A: Rahul 04/16/2019 9:35 AM   T: [place] 04/16/2019 9:28 AM                Assessment:  #1.  10 weeks status post persistent right shoulder pain with mild to moderate a.c. and mild glenohumeral osteoarthritis with cuff tendinopathy and clinical impingement with shoulder weakness. #2.  10 weeks status post improving aggravation cervical degenerative disc disease with cervical myofascial pain and infrequent episodic right arm numbness and tingling. Impression:    Encounter Diagnoses   Name Primary?  Shoulder impingement, right Yes    AC joint arthropathy     Tendinopathy of right rotator cuff     Labral tear of shoulder, degenerative, right     Chronic right shoulder pain     DDD (degenerative disc disease), cervical        Office Procedures:     Orders Placed This Encounter   Procedures   Deb Araujo MD, Orthopedic Surgery, [facility]     Referral Priority:   Routine     Referral Type:   Eval and Treat     Referral Reason:   Specialty Services Required     Referred to Provider:   Lisa Rm MD     Requested Specialty:   Orthopedic Surgery     Number of Visits Requested:   1    Ambulatory referral to Physical Therapy     Referral Priority:   Routine     Referral Type:   Eval and Treat     Referral Reason:   Specialty Services Required     Requested Specialty:   Physical Therapy     Number of Visits Requested:   1       Treatment Plan:  Treatment options were discussed with Mercedez Marcum today. We reviewed his imaging of his right shoulder is previous plain films of the cervical spine. I do think his shoulder is his primary pain generator does have documented tendinopathy and impingement with chronic labral tearing and tendinopathy without impingement. We discussed both operative and nonoperative interventions and he would like to try continuing with conservative treatment at least over the next several months as they're in the process of moving and this is the busy time of year for his work.   We did place him on a Medrol Dosepak to be followed by continuing with his diclofenac 75 mg 1 pill twice daily. Cervical symptoms seem to be improvement we can hold off on cervical imaging and EMG testing for the time being  I would like for him to sit down with Dr. Arielle Wu to discuss potential decompression and labral repair. Jeff Mcgraw He will continue with supervised physical therapy and he is continuing to work but will avoid repetitive overhead activities. Icing and activity modification was discussed. He was thinking that if he does need surgical intervention for her shoulder that later in the fall would be better for him. He will contact us the interim with questions or concerns. This dictation was performed with a verbal recognition program (DRAGON) and it was checked for errors. It is possible that there are still dictated errors within this office note. If so, please bring any errors to my attention for an addendum. All efforts were made to ensure that this office note is accurate.

## 2019-05-28 ENCOUNTER — HOSPITAL ENCOUNTER (OUTPATIENT)
Dept: PHYSICAL THERAPY | Age: 39
Setting detail: THERAPIES SERIES
Discharge: HOME | End: 2019-05-28
Payer: COMMERCIAL

## 2019-05-28 ENCOUNTER — OFFICE VISIT (OUTPATIENT)
Dept: ORTHOPEDIC SURGERY | Age: 39
End: 2019-05-28
Payer: COMMERCIAL

## 2019-05-28 VITALS
SYSTOLIC BLOOD PRESSURE: 137 MMHG | DIASTOLIC BLOOD PRESSURE: 88 MMHG | HEART RATE: 74 BPM | WEIGHT: 220 LBS | BODY MASS INDEX: 33.34 KG/M2 | HEIGHT: 68 IN

## 2019-05-28 DIAGNOSIS — M19.019 AC JOINT ARTHROPATHY: ICD-10-CM

## 2019-05-28 DIAGNOSIS — M75.21 BICEPS TENDINITIS OF RIGHT UPPER EXTREMITY: ICD-10-CM

## 2019-05-28 DIAGNOSIS — M75.41 SHOULDER IMPINGEMENT, RIGHT: Primary | ICD-10-CM

## 2019-05-28 DIAGNOSIS — M25.511 CHRONIC RIGHT SHOULDER PAIN: ICD-10-CM

## 2019-05-28 DIAGNOSIS — G89.29 CHRONIC RIGHT SHOULDER PAIN: ICD-10-CM

## 2019-05-28 DIAGNOSIS — M24.111 LABRAL TEAR OF SHOULDER, DEGENERATIVE, RIGHT: ICD-10-CM

## 2019-05-28 PROCEDURE — 97110 THERAPEUTIC EXERCISES: CPT

## 2019-05-28 PROCEDURE — 99213 OFFICE O/P EST LOW 20 MIN: CPT | Performed by: ORTHOPAEDIC SURGERY

## 2019-05-28 PROCEDURE — 97530 THERAPEUTIC ACTIVITIES: CPT

## 2019-05-28 ASSESSMENT — ENCOUNTER SYMPTOMS
EYES NEGATIVE: 1
ALLERGIC/IMMUNOLOGIC NEGATIVE: 1
GASTROINTESTINAL NEGATIVE: 1
RESPIRATORY NEGATIVE: 1

## 2019-05-28 NOTE — PROGRESS NOTES
Subjective:      Patient ID: Frandy Caro is a 34 y.o. male. HPI  Frandy Caro seen today in consultation at the request of Dr. Gina Villa valuation and treatment of ongoing right shoulder pain. He's had pain off and on for more than 10-20 years. Has become much worse over the last couple of years. He works in [employer]. He has pain reaching overhead and across the body. He's had a cortisone shot and is currently taking a Medrol Dosepak. He does not feel like the Medrol Dosepak has been helpful. He's tried physical therapy. He's had improvement in range of motion but not pain. His pain is anterior and superior in the right shoulder. He's had some numbness and tingling that seems to be resolving. He does not recall specific trauma. He is right-hand dominant. He is otherwise healthy. Review of Systems   Constitutional: Negative. HENT: Negative. Eyes: Negative. Respiratory: Negative. Cardiovascular: Negative. Gastrointestinal: Negative. Endocrine: Negative. Genitourinary: Negative. Musculoskeletal: Positive for arthralgias. Right Shoulder pain   Skin: Negative. Allergic/Immunologic: Negative. Neurological: Negative. Hematological: Negative. Psychiatric/Behavioral: Negative. Objective:   Physical Exam  General Exam:    Vitals: Blood pressure 137/88, pulse 74, height 5' 8\" (1.727 m), weight 220 lb (99.8 kg). Constitutional: Patient is adequately groomed with no evidence of malnutrition  Mental Status: The patient is oriented to time, place and person. The patient's mood and affect are appropriate. Gait:  Patient walks with normal gait and station. Lymphatic: The lymphatic examination bilaterally reveals all areas to be without enlargement or induration. Vascular: Examination reveals no swelling or calf tenderness. Peripheral pulses are palpable and 2+. Neurological: The patient has good coordination.   There is no weakness or sensory deficit. Skin:    Head/Neck: inspection reveals no rashes, ulcerations or lesions. Trunk:  inspection reveals no rashes, ulcerations or lesions. Right Lower Extremity: inspection reveals no rashes, ulcerations or lesions. Left Lower Extremity: inspection reveals no rashes, ulcerations or lesions. Examination of the cervical spine reveals no restriction in motion. There are no reproduction of symptoms into either arm with flexion, extension, rotation or palpation. The patient has a negative Spurling sign, and no tenderness. Examination of the left shoulder reveals normal scapular control and no prominence. There is no pain over the acromioclavicular or sternoclavicular joints. The patient has no biceps pain. There is full range of motion. There is no pain with impingement testing. There is no pain with Cuevas maneuver. Beaver's maneuver is normal.  There is no pain or apprehension in the abducted externally rotated position. There is no sulcus sign. There is no instability with anterior or posterior stress applied. The patient demonstrates full strength in the supraspinatus, infraspinatus, and subscapularis. Neurologic and vascular examination of the upper extremity  is normal.    Right shoulder examination is mild tenderness at the a.c. joint moderate pain at the biceps. He has pain with cross body adduction. He has full strength in the cuff. He has pain with Cuevas and impingement maneuvers. He is no instability. Cannot elicit a shift or click in the shoulder. He does not have pain loading the anterior posterior labrum. Neurologic and vascular exams were performed. Grossly intact. Right shoulder MRI is reviewed. It demonstrates  CONCLUSION:   1.  Regions of nondisplaced posterior, inferior, posterior and superior labral tearing with tiny    foci of paralabral cysts dissect along the posterior and inferior labrum.  The labral tear is    nearly circumferential.   2.  Outlet-related cuff impingement secondary to [facility] joint hypertrophy and a lateral downsloping    hypertrophied type 2 acromion.  AC arthrosis and capsulitis is contributory. 3. Moderate cuff tendinopathy and peritendinobursitis.  Regions of interstitial concealed    tearing and possibly articular surface tearing involve the distal subscapularis and less so    supraspinatus.  Frayed distal infraspinatus is also present.  No full-thickness rotator cuff    tear.  Regions of cuff tearing occupy approximately 25% of the distal depth of the    subscapularis and less so supraspinatus. 4. Arcuate segment biceps long head tendinopathy.  Subtle intrasubstance signal change within    the arcuate segment as it exits the joint relates to evolving or coalescing intrasubstance    longitudinal split tearing.  Tendon is perched on a hypertrophied lesser tuberosity at the site    of subscapularis injury and pulley injury. I reviewed these findings at length on the report and the images with the patient. Assessment:      Right shoulder bicep tendon injury, a.c. arthrosis, degenerative labral tearing      Plan:      We discussed his options at length. We reviewed the risks, benefits, and alternatives to surgery. The alternatives include conservative management including medications, injections, and physical therapy as well as observation. Risks of surgery include but are not limited to persistent pain, instability, and reinjury. Risks also include risk of infection which could result in the need for further surgery and long-term use of antibiotics. Risks also include deep venous thromboses and pulmonary emboli. Risks also include problems with anesthesia including but not limited to cardiovascular compromise , stroke,  and death. The patient understands that the goal of surgery is to improve pain and function but that can never be guaranteed. He says he would not be able to consider surgery until November.   We will see him back in September or October. He understands the potential for ongoing or worsening discomfort. If we were to proceed with surgery it would likely be a right shoulder arthroscopy with extensive debridement, biceps tenotomy, decompression, Hilda, and open subpectoral biceps tenodesis. This note was created using voice recognition software. It has been proofread, but occasionally errors remain. Please disregard these errors. They will be corrected as they are noted.

## 2019-05-28 NOTE — COMMUNICATION BODY
Assessment:       Plan:     Subjective:      Patient ID: Nahomi Lemus is a 34 y.o. male. HPI  Nahomi Lemus seen today in consultation at the request of Dr. Freddy Nichole valuation and treatment of ongoing right shoulder pain. He's had pain off and on for more than 10-20 years. Has become much worse over the last couple of years. He works in [employer]. He has pain reaching overhead and across the body. He's had a cortisone shot and is currently taking a Medrol Dosepak. He does not feel like the Medrol Dosepak has been helpful. He's tried physical therapy. He's had improvement in range of motion but not pain. His pain is anterior and superior in the right shoulder. He's had some numbness and tingling that seems to be resolving. He does not recall specific trauma. He is right-hand dominant. He is otherwise healthy. Review of Systems   Constitutional: Negative. HENT: Negative. Eyes: Negative. Respiratory: Negative. Cardiovascular: Negative. Gastrointestinal: Negative. Endocrine: Negative. Genitourinary: Negative. Musculoskeletal: Positive for arthralgias. Right Shoulder pain   Skin: Negative. Allergic/Immunologic: Negative. Neurological: Negative. Hematological: Negative. Psychiatric/Behavioral: Negative. Objective:   Physical Exam  General Exam:    Vitals: Blood pressure 137/88, pulse 74, height 5' 8\" (1.727 m), weight 220 lb (99.8 kg). Constitutional: Patient is adequately groomed with no evidence of malnutrition  Mental Status: The patient is oriented to time, place and person. The patient's mood and affect are appropriate. Gait:  Patient walks with normal gait and station. Lymphatic: The lymphatic examination bilaterally reveals all areas to be without enlargement or induration. Vascular: Examination reveals no swelling or calf tenderness. Peripheral pulses are palpable and 2+. Neurological: The patient has good coordination.   There is no weakness or sensory deficit. Skin:    Head/Neck: inspection reveals no rashes, ulcerations or lesions. Trunk:  inspection reveals no rashes, ulcerations or lesions. Right Lower Extremity: inspection reveals no rashes, ulcerations or lesions. Left Lower Extremity: inspection reveals no rashes, ulcerations or lesions. Examination of the cervical spine reveals no restriction in motion. There are no reproduction of symptoms into either arm with flexion, extension, rotation or palpation. The patient has a negative Spurling sign, and no tenderness. Examination of the left shoulder reveals normal scapular control and no prominence. There is no pain over the acromioclavicular or sternoclavicular joints. The patient has no biceps pain. There is full range of motion. There is no pain with impingement testing. There is no pain with Cuevas maneuver. Dickey's maneuver is normal.  There is no pain or apprehension in the abducted externally rotated position. There is no sulcus sign. There is no instability with anterior or posterior stress applied. The patient demonstrates full strength in the supraspinatus, infraspinatus, and subscapularis. Neurologic and vascular examination of the upper extremity  is normal.    Right shoulder examination is mild tenderness at the a.c. joint moderate pain at the biceps. He has pain with cross body adduction. He has full strength in the cuff. He has pain with Cuevas and impingement maneuvers. He is no instability. Cannot elicit a shift or click in the shoulder. He does not have pain loading the anterior posterior labrum. Neurologic and vascular exams were performed. Grossly intact. Right shoulder MRI is reviewed. It demonstrates  CONCLUSION:   1.  Regions of nondisplaced posterior, inferior, posterior and superior labral tearing with tiny    foci of paralabral cysts dissect along the posterior and inferior labrum.  The labral tear is    nearly circumferential.   2.  Outlet-related cuff impingement secondary to [facility] joint hypertrophy and a lateral downsloping    hypertrophied type 2 acromion.  AC arthrosis and capsulitis is contributory. 3. Moderate cuff tendinopathy and peritendinobursitis.  Regions of interstitial concealed    tearing and possibly articular surface tearing involve the distal subscapularis and less so    supraspinatus.  Frayed distal infraspinatus is also present.  No full-thickness rotator cuff    tear.  Regions of cuff tearing occupy approximately 25% of the distal depth of the    subscapularis and less so supraspinatus. 4. Arcuate segment biceps long head tendinopathy.  Subtle intrasubstance signal change within    the arcuate segment as it exits the joint relates to evolving or coalescing intrasubstance    longitudinal split tearing.  Tendon is perched on a hypertrophied lesser tuberosity at the site    of subscapularis injury and pulley injury. I reviewed these findings at length on the report and the images with the patient. Assessment:      Right shoulder bicep tendon injury, a.c. arthrosis, degenerative labral tearing      Plan:      We discussed his options at length. We reviewed the risks, benefits, and alternatives to surgery. The alternatives include conservative management including medications, injections, and physical therapy as well as observation. Risks of surgery include but are not limited to persistent pain, instability, and reinjury. Risks also include risk of infection which could result in the need for further surgery and long-term use of antibiotics. Risks also include deep venous thromboses and pulmonary emboli. Risks also include problems with anesthesia including but not limited to cardiovascular compromise , stroke,  and death. The patient understands that the goal of surgery is to improve pain and function but that can never be guaranteed. He says he would not be able to consider surgery until November.   We will see him back in September or October. He understands the potential for ongoing or worsening discomfort. If we were to proceed with surgery it would likely be a right shoulder arthroscopy with extensive debridement, biceps tenotomy, decompression, Hilda, and open subpectoral biceps tenodesis. This note was created using voice recognition software. It has been proofread, but occasionally errors remain. Please disregard these errors. They will be corrected as they are noted.

## 2019-05-28 NOTE — PLAN OF CARE
[facility], [place]    Physical Therapy Re-Certification Plan of Care/MD UPDATE      Dear  Dr. Kp James    We had the pleasure of treating the following patient for physical therapy services at [address] [place] [address]. A summary of our findings can be found in the updated assessment below. This includes our plan of care. If you have any questions or concerns regarding these findings, please do not hesitate to contact me at the office phone number checked above. Thank you for the referral.     Physician Signature:________________________________Date:__________________  By signing above (or electronic signature), therapists plan is approved by physician    Date Range Of Visits: 19-19  Total Visits to Date: 4  Overall Response to Treatment:   []Patient is responding well to treatment and improvement is noted with regards  to goals   []Patient should continue to improve in reasonable time if they continue HEP   []Patient has plateaued and is no longer responding to skilled PT intervention    []Patient is getting worse and would benefit from return to referring MD   []Patient unable to adhere to initial POC   []Other:  Pt shows improved ROM and strength in all planes however continues to have increased pain. He notes he continues to have pain with lifting above waist height, reaching overhead and reaching behind back. Pt is to discuss sx possibilities with MD. At this time to conserve visits with possibly upcoming sx, will hold formal PT and pt will continue with HEP stretching and strengthening on his own.        Physical Therapy Daily Treatment Note  Date:  2019    Patient Name:  Norma Phelan    :  1980  MRN: [id number]  Medical/Treatment Diagnosis Information:  · Diagnosis: M75.41 (ICD-10-CM) - Shoulder impingement, right  · Treatment Diagnosis: M25.511, G89.29 (ICD-10-CM) - Chronic right shoulder pain  Insurance/Certification information:  PT CBA stretch 10x10     UT stretch     LS stretch     Isometrics     Retraction          Weight shift     Flexion     Abduction     External Rotation     Internal Rotation     Biceps     Triceps          PRE's     Flexion     Abduction        Internal Rotation     Shrugs     EXT     Reverse Flys     Serratus     Horizontal Abd with ER     Biceps     Triceps     Retraction     Prone T     Cable Column/Theraband    External Rotation    Internal Rotation    Shrugs    Lats    Ext    Flex    Scapular Retraction    BIC    TRIC    PNF        Dynamic Stability    BOW    IR/ER BB    Plyoback                Therapeutic Exercise and NMR EXR  ? (01676) Provided verbal/tactile cueing for activities related to strengthening, flexibility, endurance, ROM  for improvements in scapular, scapulothoracic and UE control with self care, reaching, carrying, lifting, house/yardwork, driving/computer work. ? (32922) Provided verbal/tactile cueing for activities related to improving balance, coordination, kinesthetic sense, posture, motor skill, proprioception  to assist with  scapular, scapulothoracic and UE control with self care, reaching, carrying, lifting, house/yardwork, driving/computer work. Therapeutic Activities:    ? (62636 or 33019) Provided verbal/tactile cueing for activities related to improving balance, coordination, kinesthetic sense, posture, motor skill, proprioception and motor activation to allow for proper function of scapular, scapulothoracic and UE control with self care, carrying, lifting, driving/computer work.      Home Exercise Program:    ? (36459) Reviewed/Progressed HEP activities related to strengthening, flexibility, endurance, ROM of scapular, scapulothoracic and UE control with self care, reaching, carrying, lifting, house/yardwork, driving/computer work  ? (50548) Reviewed/Progressed HEP activities related to improving balance, coordination, kinesthetic sense, posture, motor skill, proprioception increased symptoms or restriction. NOT MET secondary to continued pain   5. Patient will sleep with less than 1-2 disturbances from pain throughout the night. NOT MET secondary to continued pain       Progression Towards Functional goals:  ? Patient is progressing as expected towards functional goals listed. ? Progression is slowed due to complexities listed. ? Progression has been slowed due to co-morbidities. ? Plan just implemented, too soon to assess goals progression  ? Other:     ASSESSMENT:  See eval    Treatment/Activity Tolerance:  ? Patient tolerated treatment well ? Patient limited by fatique  ? Patient limited by pain  ? Patient limited by other medical complications  ? Other:      Prognosis: ? Good ? Fair  ? Poor    Patient Requires Follow-up: ? Yes  ? No    PLAN: Hold PT until sx   ? Continue per plan of care ? Alter current plan (see comments)  ? Plan of care initiated ? Hold pending MD visit ?  Discharge    Electronically signed by:  Lissette Stuart, PT, DPT, Cert DN

## 2019-05-28 NOTE — LETTER
Vitals: Blood pressure 137/88, pulse 74, height 5' 8\" (1.727 m), weight 220 lb (99.8 kg). Constitutional: Patient is adequately groomed with no evidence of malnutrition  Mental Status: The patient is oriented to time, place and person. The patient's mood and affect are appropriate. Gait:  Patient walks with normal gait and station. Lymphatic: The lymphatic examination bilaterally reveals all areas to be without enlargement or induration. Vascular: Examination reveals no swelling or calf tenderness. Peripheral pulses are palpable and 2+. Neurological: The patient has good coordination. There is no weakness or sensory deficit. Skin:    Head/Neck: inspection reveals no rashes, ulcerations or lesions. Trunk:  inspection reveals no rashes, ulcerations or lesions. Right Lower Extremity: inspection reveals no rashes, ulcerations or lesions. Left Lower Extremity: inspection reveals no rashes, ulcerations or lesions. Examination of the cervical spine reveals no restriction in motion. There are no reproduction of symptoms into either arm with flexion, extension, rotation or palpation. The patient has a negative Spurling sign, and no tenderness. Examination of the left shoulder reveals normal scapular control and no prominence. There is no pain over the acromioclavicular or sternoclavicular joints. The patient has no biceps pain. There is full range of motion. There is no pain with impingement testing. There is no pain with Cuevas maneuver. Milam's maneuver is normal.  There is no pain or apprehension in the abducted externally rotated position. There is no sulcus sign. There is no instability with anterior or posterior stress applied. The patient demonstrates full strength in the supraspinatus, infraspinatus, and subscapularis.   Neurologic and vascular examination of the upper extremity  is normal.    Right shoulder examination is mild tenderness at the a.c. joint moderate pain at the biceps. He has pain with cross body adduction. He has full strength in the cuff. He has pain with Cuevas and impingement maneuvers. He is no instability. Cannot elicit a shift or click in the shoulder. He does not have pain loading the anterior posterior labrum. Neurologic and vascular exams were performed. Grossly intact. Right shoulder MRI is reviewed. It demonstrates  CONCLUSION:   1. Regions of nondisplaced posterior, inferior, posterior and superior labral tearing with tiny    foci of paralabral cysts dissect along the posterior and inferior labrum.  The labral tear is    nearly circumferential.   2.  Outlet-related cuff impingement secondary to [facility] joint hypertrophy and a lateral downsloping    hypertrophied type 2 acromion.  AC arthrosis and capsulitis is contributory. 3. Moderate cuff tendinopathy and peritendinobursitis.  Regions of interstitial concealed    tearing and possibly articular surface tearing involve the distal subscapularis and less so    supraspinatus.  Frayed distal infraspinatus is also present.  No full-thickness rotator cuff    tear.  Regions of cuff tearing occupy approximately 25% of the distal depth of the    subscapularis and less so supraspinatus. 4. Arcuate segment biceps long head tendinopathy.  Subtle intrasubstance signal change within    the arcuate segment as it exits the joint relates to evolving or coalescing intrasubstance    longitudinal split tearing.  Tendon is perched on a hypertrophied lesser tuberosity at the site    of subscapularis injury and pulley injury. I reviewed these findings at length on the report and the images with the patient. Assessment:      Right shoulder bicep tendon injury, a.c. arthrosis, degenerative labral tearing      Plan:      We discussed his options at length. We reviewed the risks, benefits, and alternatives to surgery.   The alternatives include conservative management including medications,

## 2019-07-16 ENCOUNTER — OFFICE VISIT (OUTPATIENT)
Dept: ORTHOPEDIC SURGERY | Age: 39
End: 2019-07-16
Payer: COMMERCIAL

## 2019-07-16 VITALS — HEIGHT: 68 IN | WEIGHT: 220.02 LBS | BODY MASS INDEX: 33.35 KG/M2

## 2019-07-16 DIAGNOSIS — M19.019 AC JOINT ARTHROPATHY: ICD-10-CM

## 2019-07-16 DIAGNOSIS — M24.111 LABRAL TEAR OF SHOULDER, DEGENERATIVE, RIGHT: ICD-10-CM

## 2019-07-16 DIAGNOSIS — M17.11 PRIMARY OSTEOARTHRITIS OF RIGHT KNEE: ICD-10-CM

## 2019-07-16 DIAGNOSIS — M22.41 CHONDROMALACIA PATELLAE OF RIGHT KNEE: Primary | ICD-10-CM

## 2019-07-16 DIAGNOSIS — G89.29 CHRONIC RIGHT SHOULDER PAIN: ICD-10-CM

## 2019-07-16 DIAGNOSIS — M25.511 CHRONIC RIGHT SHOULDER PAIN: ICD-10-CM

## 2019-07-16 DIAGNOSIS — G89.29 CHRONIC PAIN OF RIGHT KNEE: ICD-10-CM

## 2019-07-16 DIAGNOSIS — M75.41 SHOULDER IMPINGEMENT, RIGHT: ICD-10-CM

## 2019-07-16 DIAGNOSIS — M25.561 CHRONIC PAIN OF RIGHT KNEE: ICD-10-CM

## 2019-07-16 PROCEDURE — 99214 OFFICE O/P EST MOD 30 MIN: CPT | Performed by: FAMILY MEDICINE

## 2019-07-16 RX ORDER — TRAMADOL HYDROCHLORIDE 50 MG/1
50 TABLET ORAL EVERY 6 HOURS PRN
Qty: 28 TABLET | Refills: 0 | Status: SHIPPED | OUTPATIENT
Start: 2019-07-16 | End: 2019-07-23

## 2019-07-16 NOTE — PROGRESS NOTES
Chief Complaint    Knee Pain (CK RIGHT KNEE) and Shoulder Pain    Initial evaluation recurrent right knee pain status post twisting injury and follow up chronic right shoulder pain with chronic labral tearing with acromioclavicular arthropathy with impingement with cuff tendinopathy and underlying cervical degenerative disc disease with cervical myofascial pain    History of Present Illness:  Liss Angel is a 34 y.o. male who is a very pleasant active white male  for [employer] which is a fairly manual job is a patient of  Dr Jenifer Conde who is being seen today upon self-referral for evaluation of an orthopedic condition to his right shoulder and right cervical spine. He has been seen in the past for his mechanical back pain and right knee arthritis and is doing reasonably well with both conditions. He is not complaining of high-grade right radicular symptoms most continue with his home-based exercises. He is being seen today for evaluation of progressively worsening right shoulder pain and stiffness to the right side of the cervical spine. He states that he has had episodic pain with active use his right shoulder for the past 3-4 years but does not recall specific history of actual injury. Since roughly mid March 2019 he has had more consistent pain to his right shoulder superiorly and posteriorly with some pain laterally most associated with overhead activity reaching away from his body as well as reaching cross body internal rotation does feel weak and stiff. He has had a history of popping in his shoulder. He does not recall any specific history of actual injury but once again his job is fairly manual he does do frequent yardwork. He has progressed the point where he is having a consistent achy pain ranges between a 5-9 out of 10 and is having difficulty sleeping. He does have weakness.   He does have soreness and stiffness of the right service cervical spine with GM/chuck 04/16/2019 9:35 AM   T: CHUCK 04/16/2019 9:28 AM                Assessment:  #1.  10 days status post mild twisting injury right knee with aggravation mild underlying knee patellofemoral compression syndrome with chondromalacia patella mild underlying medial compartment osteoarthritis with myxoid degeneration medial meniscus and prepatellar bursitis. #2.  Nearly 5 months status post persistent right shoulder pain with mild to moderate a.c. and mild glenohumeral osteoarthritis with cuff tendinopathy and clinical impingement with shoulder weakness with ongoing pain pending follow-up evaluation later this year with Dr. Duy Durham to consider arthroscopic decompression. #3.  5 months status post improving aggravation cervical degenerative disc disease with cervical myofascial pain and infrequent episodic right arm numbness and tingling. Impression:    Encounter Diagnoses   Name Primary?  Chondromalacia patellae of right knee Yes    Primary osteoarthritis of right knee     Chronic pain of right knee     Shoulder impingement, right     AC joint arthropathy     Labral tear of shoulder, degenerative, right     Chronic right shoulder pain        Office Procedures:     No orders of the defined types were placed in this encounter. Treatment Plan:  Treatment options were discussed with Melchor Kaminski today. We reviewed his previous imaging of his right shoulder is previous plain films of the cervical spine. I do think his shoulder is his primary pain generator does have documented tendinopathy and impingement with chronic labral tearing and tendinopathy with impingement. We discussed both operative and nonoperative interventions once again he has had surgical consultation with Dr. Faizan Maria but needs to get through at least November prior to considering an arthroscopic cleanup.   He will continue with his exercise program as well as his chronic diclofenac 75 mg 1 pill twice daily and we will give him some

## 2019-08-29 ENCOUNTER — OFFICE VISIT (OUTPATIENT)
Dept: ORTHOPEDIC SURGERY | Age: 39
End: 2019-08-29
Payer: COMMERCIAL

## 2019-08-29 VITALS
BODY MASS INDEX: 31.83 KG/M2 | HEART RATE: 75 BPM | DIASTOLIC BLOOD PRESSURE: 81 MMHG | HEIGHT: 68 IN | WEIGHT: 210 LBS | SYSTOLIC BLOOD PRESSURE: 127 MMHG

## 2019-08-29 DIAGNOSIS — M75.21 BICEPS TENDINITIS OF RIGHT UPPER EXTREMITY: ICD-10-CM

## 2019-08-29 DIAGNOSIS — G89.29 CHRONIC RIGHT SHOULDER PAIN: ICD-10-CM

## 2019-08-29 DIAGNOSIS — M67.911 TENDINOPATHY OF RIGHT ROTATOR CUFF: ICD-10-CM

## 2019-08-29 DIAGNOSIS — M75.41 SHOULDER IMPINGEMENT, RIGHT: Primary | ICD-10-CM

## 2019-08-29 DIAGNOSIS — M25.511 CHRONIC RIGHT SHOULDER PAIN: ICD-10-CM

## 2019-08-29 DIAGNOSIS — M24.111 LABRAL TEAR OF SHOULDER, DEGENERATIVE, RIGHT: ICD-10-CM

## 2019-08-29 DIAGNOSIS — M19.019 AC JOINT ARTHROPATHY: ICD-10-CM

## 2019-08-29 PROCEDURE — 99212 OFFICE O/P EST SF 10 MIN: CPT | Performed by: ORTHOPAEDIC SURGERY

## 2019-08-29 ASSESSMENT — ENCOUNTER SYMPTOMS
EYES NEGATIVE: 1
RESPIRATORY NEGATIVE: 1
ALLERGIC/IMMUNOLOGIC NEGATIVE: 1
GASTROINTESTINAL NEGATIVE: 1

## 2019-08-29 NOTE — PROGRESS NOTES
Subjective:      Patient ID: Jessica Perry is a 34 y.o. male. HPI    Review of Systems   Constitutional: Negative. HENT: Negative. Eyes: Negative. Respiratory: Negative. Cardiovascular: Negative. Gastrointestinal: Negative. Endocrine: Negative. Genitourinary: Negative. Musculoskeletal: Positive for arthralgias. Right shoulder pain   Skin: Negative. Allergic/Immunologic: Negative. Neurological: Negative. Hematological: Negative. Psychiatric/Behavioral: Negative. Objective:   Physical Exam    Assessment:            Plan: This note was created using voice recognition software. It has been proofread, but occasionally errors remain. Please disregard these errors. They will be corrected as they are noted.

## 2019-10-29 ENCOUNTER — OFFICE VISIT (OUTPATIENT)
Dept: ORTHOPEDIC SURGERY | Age: 39
End: 2019-10-29
Payer: COMMERCIAL

## 2019-10-29 ENCOUNTER — TELEPHONE (OUTPATIENT)
Dept: ORTHOPEDIC SURGERY | Age: 39
End: 2019-10-29

## 2019-10-29 VITALS
BODY MASS INDEX: 31.83 KG/M2 | WEIGHT: 210 LBS | SYSTOLIC BLOOD PRESSURE: 113 MMHG | HEIGHT: 68 IN | RESPIRATION RATE: 12 BRPM | HEART RATE: 79 BPM | DIASTOLIC BLOOD PRESSURE: 74 MMHG

## 2019-10-29 DIAGNOSIS — M19.019 AC JOINT ARTHROPATHY: Primary | ICD-10-CM

## 2019-10-29 DIAGNOSIS — M75.41 SHOULDER IMPINGEMENT, RIGHT: ICD-10-CM

## 2019-10-29 PROCEDURE — L3670 SO ACRO/CLAV CAN WEB PRE OTS: HCPCS | Performed by: ORTHOPAEDIC SURGERY

## 2019-10-29 PROCEDURE — 99212 OFFICE O/P EST SF 10 MIN: CPT | Performed by: ORTHOPAEDIC SURGERY

## 2019-10-30 ENCOUNTER — TELEPHONE (OUTPATIENT)
Dept: ORTHOPEDIC SURGERY | Age: 39
End: 2019-10-30

## 2019-11-04 ENCOUNTER — ANESTHESIA EVENT (OUTPATIENT)
Dept: OPERATING ROOM | Age: 39
End: 2019-11-04
Payer: COMMERCIAL

## 2019-11-04 ENCOUNTER — TELEPHONE (OUTPATIENT)
Dept: ORTHOPEDIC SURGERY | Age: 39
End: 2019-11-04

## 2019-11-05 ENCOUNTER — TELEPHONE (OUTPATIENT)
Dept: ORTHOPEDIC SURGERY | Age: 39
End: 2019-11-05

## 2019-11-05 DIAGNOSIS — M19.019 AC JOINT ARTHROPATHY: Primary | ICD-10-CM

## 2019-11-05 DIAGNOSIS — M75.41 SHOULDER IMPINGEMENT, RIGHT: ICD-10-CM

## 2019-11-05 DIAGNOSIS — M75.21 BICEPS TENDINITIS OF RIGHT UPPER EXTREMITY: ICD-10-CM

## 2019-11-05 RX ORDER — PROMETHAZINE HYDROCHLORIDE 25 MG/1
25 TABLET ORAL EVERY 6 HOURS PRN
Qty: 30 TABLET | Refills: 0 | Status: SHIPPED | OUTPATIENT
Start: 2019-11-05 | End: 2019-11-12

## 2019-11-05 RX ORDER — DOCUSATE SODIUM 100 MG/1
100 CAPSULE, LIQUID FILLED ORAL 2 TIMES DAILY
Qty: 60 CAPSULE | Refills: 0 | Status: SHIPPED | OUTPATIENT
Start: 2019-11-05 | End: 2020-01-07

## 2019-11-05 RX ORDER — OXYCODONE HYDROCHLORIDE AND ACETAMINOPHEN 5; 325 MG/1; MG/1
1 TABLET ORAL EVERY 6 HOURS PRN
Qty: 20 TABLET | Refills: 0 | Status: SHIPPED | OUTPATIENT
Start: 2019-11-06 | End: 2019-11-11

## 2019-11-06 ENCOUNTER — ANESTHESIA (OUTPATIENT)
Dept: OPERATING ROOM | Age: 39
End: 2019-11-06
Payer: COMMERCIAL

## 2019-11-06 ENCOUNTER — HOSPITAL ENCOUNTER (OUTPATIENT)
Age: 39
Setting detail: OUTPATIENT SURGERY
Discharge: HOME | End: 2019-11-06
Attending: ORTHOPAEDIC SURGERY | Admitting: ORTHOPAEDIC SURGERY
Payer: COMMERCIAL

## 2019-11-06 VITALS
SYSTOLIC BLOOD PRESSURE: 140 MMHG | BODY MASS INDEX: 32.61 KG/M2 | WEIGHT: 215.17 LBS | HEIGHT: 68 IN | TEMPERATURE: 97.4 F | DIASTOLIC BLOOD PRESSURE: 93 MMHG | OXYGEN SATURATION: 95 % | RESPIRATION RATE: 16 BRPM | HEART RATE: 58 BPM

## 2019-11-06 VITALS
TEMPERATURE: 96.3 F | SYSTOLIC BLOOD PRESSURE: 91 MMHG | RESPIRATION RATE: 10 BRPM | OXYGEN SATURATION: 98 % | DIASTOLIC BLOOD PRESSURE: 59 MMHG

## 2019-11-06 PROCEDURE — 7100000001 HC PACU RECOVERY - ADDTL 15 MIN: Performed by: ORTHOPAEDIC SURGERY

## 2019-11-06 PROCEDURE — 7100000010 HC PHASE II RECOVERY - FIRST 15 MIN: Performed by: ORTHOPAEDIC SURGERY

## 2019-11-06 PROCEDURE — 2580000003 HC RX 258: Performed by: ANESTHESIOLOGY

## 2019-11-06 PROCEDURE — 2500000003 HC RX 250 WO HCPCS

## 2019-11-06 PROCEDURE — 64415 NJX AA&/STRD BRCH PLXS IMG: CPT | Performed by: ANESTHESIOLOGY

## 2019-11-06 PROCEDURE — 3600000004 HC SURGERY LEVEL 4 BASE: Performed by: ORTHOPAEDIC SURGERY

## 2019-11-06 PROCEDURE — 6370000000 HC RX 637 (ALT 250 FOR IP): Performed by: ANESTHESIOLOGY

## 2019-11-06 PROCEDURE — 7100000000 HC PACU RECOVERY - FIRST 15 MIN: Performed by: ORTHOPAEDIC SURGERY

## 2019-11-06 PROCEDURE — 7100000011 HC PHASE II RECOVERY - ADDTL 15 MIN: Performed by: ORTHOPAEDIC SURGERY

## 2019-11-06 PROCEDURE — 3700000001 HC ADD 15 MINUTES (ANESTHESIA): Performed by: ORTHOPAEDIC SURGERY

## 2019-11-06 PROCEDURE — 6360000002 HC RX W HCPCS: Performed by: ORTHOPAEDIC SURGERY

## 2019-11-06 PROCEDURE — 2580000003 HC RX 258: Performed by: ORTHOPAEDIC SURGERY

## 2019-11-06 PROCEDURE — 6360000002 HC RX W HCPCS

## 2019-11-06 PROCEDURE — 2720000010 HC SURG SUPPLY STERILE: Performed by: ORTHOPAEDIC SURGERY

## 2019-11-06 PROCEDURE — C1713 ANCHOR/SCREW BN/BN,TIS/BN: HCPCS | Performed by: ORTHOPAEDIC SURGERY

## 2019-11-06 PROCEDURE — 3600000014 HC SURGERY LEVEL 4 ADDTL 15MIN: Performed by: ORTHOPAEDIC SURGERY

## 2019-11-06 PROCEDURE — 2709999900 HC NON-CHARGEABLE SUPPLY: Performed by: ORTHOPAEDIC SURGERY

## 2019-11-06 PROCEDURE — 2500000003 HC RX 250 WO HCPCS: Performed by: ORTHOPAEDIC SURGERY

## 2019-11-06 PROCEDURE — 3700000000 HC ANESTHESIA ATTENDED CARE: Performed by: ORTHOPAEDIC SURGERY

## 2019-11-06 DEVICE — SCREW INTRF BIOCOMP 7X10 MM FACILITATE BIOTENODESIS: Type: IMPLANTABLE DEVICE | Site: SHOULDER | Status: FUNCTIONAL

## 2019-11-06 RX ORDER — ONDANSETRON 2 MG/ML
INJECTION INTRAMUSCULAR; INTRAVENOUS PRN
Status: DISCONTINUED | OUTPATIENT
Start: 2019-11-06 | End: 2019-11-06 | Stop reason: SDUPTHER

## 2019-11-06 RX ORDER — SODIUM CHLORIDE 0.9 % (FLUSH) 0.9 %
10 SYRINGE (ML) INJECTION PRN
Status: DISCONTINUED | OUTPATIENT
Start: 2019-11-06 | End: 2019-11-06 | Stop reason: HOSPADM

## 2019-11-06 RX ORDER — SUCCINYLCHOLINE/SOD CL,ISO/PF 200MG/10ML
SYRINGE (ML) INTRAVENOUS PRN
Status: DISCONTINUED | OUTPATIENT
Start: 2019-11-06 | End: 2019-11-06 | Stop reason: SDUPTHER

## 2019-11-06 RX ORDER — OXYCODONE HYDROCHLORIDE AND ACETAMINOPHEN 5; 325 MG/1; MG/1
1 TABLET ORAL PRN
Status: COMPLETED | OUTPATIENT
Start: 2019-11-06 | End: 2019-11-06

## 2019-11-06 RX ORDER — PROPOFOL 10 MG/ML
INJECTION, EMULSION INTRAVENOUS PRN
Status: DISCONTINUED | OUTPATIENT
Start: 2019-11-06 | End: 2019-11-06 | Stop reason: SDUPTHER

## 2019-11-06 RX ORDER — FENTANYL CITRATE 50 UG/ML
25 INJECTION, SOLUTION INTRAMUSCULAR; INTRAVENOUS EVERY 5 MIN PRN
Status: DISCONTINUED | OUTPATIENT
Start: 2019-11-06 | End: 2019-11-06 | Stop reason: HOSPADM

## 2019-11-06 RX ORDER — DEXAMETHASONE SODIUM PHOSPHATE 4 MG/ML
INJECTION, SOLUTION INTRA-ARTICULAR; INTRALESIONAL; INTRAMUSCULAR; INTRAVENOUS; SOFT TISSUE PRN
Status: DISCONTINUED | OUTPATIENT
Start: 2019-11-06 | End: 2019-11-06 | Stop reason: SDUPTHER

## 2019-11-06 RX ORDER — ONDANSETRON 2 MG/ML
4 INJECTION INTRAMUSCULAR; INTRAVENOUS
Status: DISCONTINUED | OUTPATIENT
Start: 2019-11-06 | End: 2019-11-06 | Stop reason: HOSPADM

## 2019-11-06 RX ORDER — MIDAZOLAM HYDROCHLORIDE 1 MG/ML
INJECTION INTRAMUSCULAR; INTRAVENOUS PRN
Status: DISCONTINUED | OUTPATIENT
Start: 2019-11-06 | End: 2019-11-06 | Stop reason: SDUPTHER

## 2019-11-06 RX ORDER — LIDOCAINE HYDROCHLORIDE 20 MG/ML
INJECTION, SOLUTION EPIDURAL; INFILTRATION; INTRACAUDAL; PERINEURAL PRN
Status: DISCONTINUED | OUTPATIENT
Start: 2019-11-06 | End: 2019-11-06 | Stop reason: SDUPTHER

## 2019-11-06 RX ORDER — FENTANYL CITRATE 50 UG/ML
INJECTION, SOLUTION INTRAMUSCULAR; INTRAVENOUS PRN
Status: DISCONTINUED | OUTPATIENT
Start: 2019-11-06 | End: 2019-11-06 | Stop reason: SDUPTHER

## 2019-11-06 RX ORDER — TRAMADOL HYDROCHLORIDE 50 MG/1
50 TABLET ORAL EVERY 6 HOURS PRN
COMMUNITY
End: 2020-10-01

## 2019-11-06 RX ORDER — ROCURONIUM BROMIDE 10 MG/ML
INJECTION, SOLUTION INTRAVENOUS PRN
Status: DISCONTINUED | OUTPATIENT
Start: 2019-11-06 | End: 2019-11-06 | Stop reason: SDUPTHER

## 2019-11-06 RX ORDER — SODIUM CHLORIDE, SODIUM LACTATE, POTASSIUM CHLORIDE, CALCIUM CHLORIDE 600; 310; 30; 20 MG/100ML; MG/100ML; MG/100ML; MG/100ML
INJECTION, SOLUTION INTRAVENOUS CONTINUOUS PRN
Status: COMPLETED | OUTPATIENT
Start: 2019-11-06 | End: 2019-11-06

## 2019-11-06 RX ORDER — SODIUM CHLORIDE 9 MG/ML
INJECTION, SOLUTION INTRAVENOUS CONTINUOUS
Status: DISCONTINUED | OUTPATIENT
Start: 2019-11-06 | End: 2019-11-06 | Stop reason: HOSPADM

## 2019-11-06 RX ORDER — SODIUM CHLORIDE 0.9 % (FLUSH) 0.9 %
10 SYRINGE (ML) INJECTION EVERY 12 HOURS SCHEDULED
Status: DISCONTINUED | OUTPATIENT
Start: 2019-11-06 | End: 2019-11-06 | Stop reason: HOSPADM

## 2019-11-06 RX ORDER — BUPIVACAINE HYDROCHLORIDE AND EPINEPHRINE 2.5; 5 MG/ML; UG/ML
INJECTION, SOLUTION EPIDURAL; INFILTRATION; INTRACAUDAL; PERINEURAL
Status: COMPLETED | OUTPATIENT
Start: 2019-11-06 | End: 2019-11-06

## 2019-11-06 RX ORDER — OXYCODONE HYDROCHLORIDE AND ACETAMINOPHEN 5; 325 MG/1; MG/1
2 TABLET ORAL PRN
Status: COMPLETED | OUTPATIENT
Start: 2019-11-06 | End: 2019-11-06

## 2019-11-06 RX ADMIN — SUGAMMADEX 100 MG: 100 INJECTION, SOLUTION INTRAVENOUS at 10:39

## 2019-11-06 RX ADMIN — LIDOCAINE HYDROCHLORIDE 100 MG: 20 INJECTION, SOLUTION EPIDURAL; INFILTRATION; INTRACAUDAL; PERINEURAL at 09:30

## 2019-11-06 RX ADMIN — ONDANSETRON 4 MG: 2 INJECTION INTRAMUSCULAR; INTRAVENOUS at 09:44

## 2019-11-06 RX ADMIN — Medication 2 G: at 09:20

## 2019-11-06 RX ADMIN — MIDAZOLAM 2 MG: 1 INJECTION INTRAMUSCULAR; INTRAVENOUS at 08:48

## 2019-11-06 RX ADMIN — ROCURONIUM BROMIDE 45 MG: 10 INJECTION INTRAVENOUS at 09:36

## 2019-11-06 RX ADMIN — FENTANYL CITRATE 50 MCG: 50 INJECTION INTRAMUSCULAR; INTRAVENOUS at 09:30

## 2019-11-06 RX ADMIN — FENTANYL CITRATE 50 MCG: 50 INJECTION INTRAMUSCULAR; INTRAVENOUS at 09:45

## 2019-11-06 RX ADMIN — ROCURONIUM BROMIDE 5 MG: 10 INJECTION INTRAVENOUS at 09:30

## 2019-11-06 RX ADMIN — SODIUM CHLORIDE: 9 INJECTION, SOLUTION INTRAVENOUS at 07:33

## 2019-11-06 RX ADMIN — PROPOFOL 170 MG: 10 INJECTION, EMULSION INTRAVENOUS at 09:30

## 2019-11-06 RX ADMIN — DEXAMETHASONE SODIUM PHOSPHATE 8 MG: 4 INJECTION, SOLUTION INTRAMUSCULAR; INTRAVENOUS at 09:44

## 2019-11-06 RX ADMIN — SUGAMMADEX 100 MG: 100 INJECTION, SOLUTION INTRAVENOUS at 10:37

## 2019-11-06 RX ADMIN — OXYCODONE HYDROCHLORIDE AND ACETAMINOPHEN 1 TABLET: 5; 325 TABLET ORAL at 11:46

## 2019-11-06 RX ADMIN — Medication 140 MG: at 09:30

## 2019-11-06 RX ADMIN — SODIUM CHLORIDE: 9 INJECTION, SOLUTION INTRAVENOUS at 10:00

## 2019-11-06 RX ADMIN — HYDROMORPHONE HYDROCHLORIDE 1 MG: 1 INJECTION, SOLUTION INTRAMUSCULAR; INTRAVENOUS; SUBCUTANEOUS at 10:00

## 2019-11-06 ASSESSMENT — PULMONARY FUNCTION TESTS
PIF_VALUE: 12
PIF_VALUE: 22
PIF_VALUE: 22
PIF_VALUE: 14
PIF_VALUE: 23
PIF_VALUE: 22
PIF_VALUE: 19
PIF_VALUE: 18
PIF_VALUE: 19
PIF_VALUE: 14
PIF_VALUE: 22
PIF_VALUE: 5
PIF_VALUE: 22
PIF_VALUE: 21
PIF_VALUE: 22
PIF_VALUE: 19
PIF_VALUE: 26
PIF_VALUE: 12
PIF_VALUE: 22
PIF_VALUE: 21
PIF_VALUE: 22
PIF_VALUE: 14
PIF_VALUE: 12
PIF_VALUE: 22
PIF_VALUE: 22
PIF_VALUE: 1
PIF_VALUE: 18
PIF_VALUE: 12
PIF_VALUE: 22
PIF_VALUE: 21
PIF_VALUE: 10
PIF_VALUE: 18
PIF_VALUE: 22
PIF_VALUE: 0
PIF_VALUE: 1
PIF_VALUE: 0
PIF_VALUE: 22
PIF_VALUE: 6
PIF_VALUE: 22
PIF_VALUE: 19
PIF_VALUE: 22
PIF_VALUE: 22
PIF_VALUE: 17
PIF_VALUE: 22
PIF_VALUE: 0
PIF_VALUE: 22
PIF_VALUE: 14
PIF_VALUE: 19
PIF_VALUE: 19
PIF_VALUE: 22
PIF_VALUE: 1
PIF_VALUE: 22
PIF_VALUE: 18
PIF_VALUE: 19
PIF_VALUE: 19
PIF_VALUE: 1
PIF_VALUE: 19
PIF_VALUE: 9
PIF_VALUE: 22
PIF_VALUE: 21
PIF_VALUE: 1
PIF_VALUE: 16
PIF_VALUE: 22
PIF_VALUE: 22
PIF_VALUE: 18
PIF_VALUE: 22
PIF_VALUE: 22
PIF_VALUE: 9
PIF_VALUE: 22
PIF_VALUE: 21
PIF_VALUE: 14
PIF_VALUE: 22
PIF_VALUE: 21
PIF_VALUE: 22
PIF_VALUE: 22
PIF_VALUE: 19
PIF_VALUE: 22
PIF_VALUE: 6
PIF_VALUE: 22

## 2019-11-06 ASSESSMENT — PAIN DESCRIPTION - ORIENTATION
ORIENTATION: RIGHT

## 2019-11-06 ASSESSMENT — PAIN DESCRIPTION - DESCRIPTORS
DESCRIPTORS: DISCOMFORT
DESCRIPTORS: ACHING

## 2019-11-06 ASSESSMENT — PAIN SCALES - GENERAL
PAINLEVEL_OUTOF10: 0
PAINLEVEL_OUTOF10: 6
PAINLEVEL_OUTOF10: 4
PAINLEVEL_OUTOF10: 2
PAINLEVEL_OUTOF10: 6

## 2019-11-06 ASSESSMENT — PAIN DESCRIPTION - LOCATION
LOCATION: ARM

## 2019-11-06 ASSESSMENT — PAIN DESCRIPTION - PAIN TYPE
TYPE: SURGICAL PAIN

## 2019-11-06 ASSESSMENT — PAIN DESCRIPTION - PROGRESSION
CLINICAL_PROGRESSION: GRADUALLY WORSENING
CLINICAL_PROGRESSION: GRADUALLY IMPROVING
CLINICAL_PROGRESSION: NOT CHANGED

## 2019-11-06 ASSESSMENT — PAIN DESCRIPTION - FREQUENCY
FREQUENCY: CONTINUOUS

## 2019-11-06 ASSESSMENT — PAIN - FUNCTIONAL ASSESSMENT
PAIN_FUNCTIONAL_ASSESSMENT: 0-10
PAIN_FUNCTIONAL_ASSESSMENT: PREVENTS OR INTERFERES SOME ACTIVE ACTIVITIES AND ADLS
PAIN_FUNCTIONAL_ASSESSMENT: ACTIVITIES ARE NOT PREVENTED

## 2019-11-06 ASSESSMENT — PAIN DESCRIPTION - ONSET
ONSET: ON-GOING

## 2019-11-07 ENCOUNTER — OFFICE VISIT (OUTPATIENT)
Dept: ORTHOPEDIC SURGERY | Age: 39
End: 2019-11-07

## 2019-11-07 ENCOUNTER — HOSPITAL ENCOUNTER (OUTPATIENT)
Dept: PHYSICAL THERAPY | Age: 39
Setting detail: THERAPIES SERIES
Discharge: HOME | End: 2019-11-07
Payer: COMMERCIAL

## 2019-11-07 VITALS — BODY MASS INDEX: 31.83 KG/M2 | WEIGHT: 210 LBS | HEIGHT: 68 IN

## 2019-11-07 DIAGNOSIS — M24.111 LABRAL TEAR OF SHOULDER, DEGENERATIVE, RIGHT: ICD-10-CM

## 2019-11-07 DIAGNOSIS — M75.21 BICEPS TENDINITIS OF RIGHT UPPER EXTREMITY: ICD-10-CM

## 2019-11-07 DIAGNOSIS — M75.41 SHOULDER IMPINGEMENT, RIGHT: ICD-10-CM

## 2019-11-07 DIAGNOSIS — M19.019 AC JOINT ARTHROPATHY: Primary | ICD-10-CM

## 2019-11-07 PROCEDURE — 97161 PT EVAL LOW COMPLEX 20 MIN: CPT

## 2019-11-07 PROCEDURE — 97110 THERAPEUTIC EXERCISES: CPT

## 2019-11-07 PROCEDURE — 99024 POSTOP FOLLOW-UP VISIT: CPT | Performed by: ORTHOPAEDIC SURGERY

## 2019-11-07 PROCEDURE — 97530 THERAPEUTIC ACTIVITIES: CPT

## 2019-11-14 ENCOUNTER — OFFICE VISIT (OUTPATIENT)
Dept: ORTHOPEDIC SURGERY | Age: 39
End: 2019-11-14

## 2019-11-14 ENCOUNTER — HOSPITAL ENCOUNTER (OUTPATIENT)
Dept: PHYSICAL THERAPY | Age: 39
Setting detail: THERAPIES SERIES
Discharge: HOME | End: 2019-11-14
Payer: COMMERCIAL

## 2019-11-14 VITALS — BODY MASS INDEX: 31.83 KG/M2 | WEIGHT: 210 LBS | HEIGHT: 68 IN

## 2019-11-14 DIAGNOSIS — M75.21 BICEPS TENDINITIS OF RIGHT UPPER EXTREMITY: ICD-10-CM

## 2019-11-14 DIAGNOSIS — M75.41 SHOULDER IMPINGEMENT, RIGHT: ICD-10-CM

## 2019-11-14 DIAGNOSIS — M19.019 AC JOINT ARTHROPATHY: Primary | ICD-10-CM

## 2019-11-14 PROCEDURE — 99024 POSTOP FOLLOW-UP VISIT: CPT | Performed by: ORTHOPAEDIC SURGERY

## 2019-11-14 PROCEDURE — 97110 THERAPEUTIC EXERCISES: CPT

## 2019-11-14 PROCEDURE — 97112 NEUROMUSCULAR REEDUCATION: CPT

## 2019-11-21 ENCOUNTER — HOSPITAL ENCOUNTER (OUTPATIENT)
Dept: PHYSICAL THERAPY | Age: 39
Setting detail: THERAPIES SERIES
Discharge: HOME | End: 2019-11-21
Payer: COMMERCIAL

## 2019-11-21 PROCEDURE — 97112 NEUROMUSCULAR REEDUCATION: CPT

## 2019-11-21 PROCEDURE — 97110 THERAPEUTIC EXERCISES: CPT

## 2019-11-27 ENCOUNTER — HOSPITAL ENCOUNTER (OUTPATIENT)
Dept: PHYSICAL THERAPY | Age: 39
Setting detail: THERAPIES SERIES
Discharge: HOME | End: 2019-11-27
Payer: COMMERCIAL

## 2019-11-27 PROCEDURE — 97112 NEUROMUSCULAR REEDUCATION: CPT

## 2019-11-27 PROCEDURE — 97110 THERAPEUTIC EXERCISES: CPT

## 2019-12-05 ENCOUNTER — HOSPITAL ENCOUNTER (OUTPATIENT)
Dept: PHYSICAL THERAPY | Age: 39
Setting detail: THERAPIES SERIES
Discharge: HOME | End: 2019-12-05
Payer: COMMERCIAL

## 2019-12-05 ENCOUNTER — APPOINTMENT (OUTPATIENT)
Dept: PHYSICAL THERAPY | Age: 39
End: 2019-12-05
Payer: COMMERCIAL

## 2019-12-05 ENCOUNTER — OFFICE VISIT (OUTPATIENT)
Dept: ORTHOPEDIC SURGERY | Age: 39
End: 2019-12-05

## 2019-12-05 VITALS — RESPIRATION RATE: 12 BRPM | WEIGHT: 210 LBS | BODY MASS INDEX: 31.83 KG/M2 | HEIGHT: 68 IN

## 2019-12-05 DIAGNOSIS — M19.019 AC JOINT ARTHROPATHY: Primary | ICD-10-CM

## 2019-12-05 DIAGNOSIS — M75.21 BICEPS TENDINITIS OF RIGHT UPPER EXTREMITY: ICD-10-CM

## 2019-12-05 DIAGNOSIS — M75.41 SHOULDER IMPINGEMENT, RIGHT: ICD-10-CM

## 2019-12-05 PROCEDURE — 97112 NEUROMUSCULAR REEDUCATION: CPT

## 2019-12-05 PROCEDURE — 97110 THERAPEUTIC EXERCISES: CPT

## 2019-12-05 PROCEDURE — 97530 THERAPEUTIC ACTIVITIES: CPT

## 2019-12-05 PROCEDURE — 99024 POSTOP FOLLOW-UP VISIT: CPT | Performed by: ORTHOPAEDIC SURGERY

## 2019-12-12 ENCOUNTER — HOSPITAL ENCOUNTER (OUTPATIENT)
Dept: PHYSICAL THERAPY | Age: 39
Setting detail: THERAPIES SERIES
Discharge: HOME | End: 2019-12-12
Payer: COMMERCIAL

## 2019-12-12 PROCEDURE — 97110 THERAPEUTIC EXERCISES: CPT

## 2019-12-12 PROCEDURE — 97530 THERAPEUTIC ACTIVITIES: CPT

## 2019-12-12 PROCEDURE — 97112 NEUROMUSCULAR REEDUCATION: CPT

## 2019-12-17 ENCOUNTER — HOSPITAL ENCOUNTER (OUTPATIENT)
Dept: PHYSICAL THERAPY | Age: 39
Setting detail: THERAPIES SERIES
Discharge: HOME | End: 2019-12-17
Payer: COMMERCIAL

## 2019-12-17 PROCEDURE — 97530 THERAPEUTIC ACTIVITIES: CPT

## 2019-12-17 PROCEDURE — 97112 NEUROMUSCULAR REEDUCATION: CPT

## 2019-12-17 PROCEDURE — 97110 THERAPEUTIC EXERCISES: CPT

## 2019-12-19 ENCOUNTER — HOSPITAL ENCOUNTER (OUTPATIENT)
Dept: PHYSICAL THERAPY | Age: 39
Setting detail: THERAPIES SERIES
Discharge: HOME | End: 2019-12-19
Payer: COMMERCIAL

## 2019-12-19 PROCEDURE — 97112 NEUROMUSCULAR REEDUCATION: CPT

## 2019-12-19 PROCEDURE — 97530 THERAPEUTIC ACTIVITIES: CPT

## 2019-12-19 PROCEDURE — 97110 THERAPEUTIC EXERCISES: CPT

## 2019-12-24 ENCOUNTER — HOSPITAL ENCOUNTER (OUTPATIENT)
Dept: PHYSICAL THERAPY | Age: 39
Setting detail: THERAPIES SERIES
Discharge: HOME | End: 2019-12-24
Payer: COMMERCIAL

## 2019-12-24 PROCEDURE — 97112 NEUROMUSCULAR REEDUCATION: CPT

## 2019-12-24 PROCEDURE — 97530 THERAPEUTIC ACTIVITIES: CPT

## 2019-12-24 PROCEDURE — 97110 THERAPEUTIC EXERCISES: CPT

## 2019-12-26 ENCOUNTER — HOSPITAL ENCOUNTER (OUTPATIENT)
Dept: PHYSICAL THERAPY | Age: 39
Setting detail: THERAPIES SERIES
Discharge: HOME | End: 2019-12-26
Payer: COMMERCIAL

## 2019-12-26 PROCEDURE — 97110 THERAPEUTIC EXERCISES: CPT

## 2019-12-26 PROCEDURE — 97112 NEUROMUSCULAR REEDUCATION: CPT

## 2019-12-26 PROCEDURE — 97530 THERAPEUTIC ACTIVITIES: CPT

## 2019-12-30 ENCOUNTER — HOSPITAL ENCOUNTER (OUTPATIENT)
Dept: PHYSICAL THERAPY | Age: 39
Setting detail: THERAPIES SERIES
Discharge: HOME | End: 2019-12-30
Payer: COMMERCIAL

## 2019-12-30 NOTE — FLOWSHEET NOTE
[address] [facility]       Physical Therapy   Phone: [phone]   Fax: [phone]      Physical Therapy  Cancellation/No-show Note  Patient Name:  Denver Quiñonez  :  1980   Date:  2019  Cancelled visits to date: 1  No-shows to date: 0    For today's appointment patient:  [x]  Cancelled  []  Rescheduled appointment  []  No-show     Reason given by patient:  [x]  Patient ill  []  Conflicting appointment  []  No transportation    []  Conflict with work  []  No reason given  []  Other:     Comments:      Electronically signed by:  Eddie New, PT

## 2019-12-31 ENCOUNTER — APPOINTMENT (OUTPATIENT)
Dept: PHYSICAL THERAPY | Age: 39
End: 2019-12-31
Payer: COMMERCIAL

## 2020-01-02 ENCOUNTER — HOSPITAL ENCOUNTER (OUTPATIENT)
Dept: PHYSICAL THERAPY | Age: 40
Setting detail: THERAPIES SERIES
Discharge: HOME | End: 2020-01-02
Payer: COMMERCIAL

## 2020-01-02 PROCEDURE — 97112 NEUROMUSCULAR REEDUCATION: CPT

## 2020-01-02 PROCEDURE — 97530 THERAPEUTIC ACTIVITIES: CPT

## 2020-01-02 PROCEDURE — 97110 THERAPEUTIC EXERCISES: CPT

## 2020-01-02 NOTE — FLOWSHEET NOTE
[facility], [place]      Physical Therapy Daily Treatment Note  Date:  2020    Patient Name:  Carvel Frankel    :  1980  MRN: [id number]  Medical/Treatment Diagnosis Information:  · Diagnosis: M75.41 (ICD-10-CM) - Impingement syndrome of right shoulder region  · Treatment Diagnosis: M75.41 (ICD-10-CM) - Impingement syndrome of right shoulder region  Insurance/Certification information:  PT Insurance Information: [employer]  Physician Information:  Referring Practitioner: Dr. Elisabeth Jimenes  Has the plan of care been signed (Y/N):        []  Yes  [x]  No     Date of Patient follow up with Physician: 19      Is this a Progress Report:     []  Yes  [x]  No        Progress report will be due (10 Rx or 30 days whichever is less):       Recertification will be due (POC Duration  / 90 days whichever is less): 19         Visit # Insurance Allowable Auth Required   1 (10 used 2019) 30 []  Yes [x]  No        Functional Scale: UEFI: 85% disability  Date assessed: 19      Latex Allergy:  [x]NO      []YES   Preferred Language for Healthcare:   [x]English       []other:    Pain level:  2/10     SUBJECTIVE:  Patient is 8 weeks s/p. Pt notes he is doing well, no issues with shoulder.      OBJECTIVE:    Observation:    Test measurements:              ROM PROM AROM  Comment     L R L R 19     Flexion       170 wall slides     Abduction      175 wall slides     ER        93 wand     IR        L1-2with rope      Other             Other                    Strength L R  Comment   Flexion   Not tested d/t post-op status     Abduction         ER         IR         Supraspinatus         Upper Trap         Lower Trap         Mid Trap         Rhomboids         Biceps         Triceps         Horizontal Abduction         Horizontal Adduction         Lats             RESTRICTIONS/PRECAUTIONS: Active elbow flexion 4-6 weeks    Exercises/Interventions:   Exercise/Equipment for proper function of scapular, scapulothoracic and UE control with self care, carrying, lifting, driving/computer work. Home Exercise Program:    [x] (26792) Reviewed/Progressed HEP activities related to strengthening, flexibility, endurance, ROM of scapular, scapulothoracic and UE control with self care, reaching, carrying, lifting, house/yardwork, driving/computer work  [] (49025) Reviewed/Progressed HEP activities related to improving balance, coordination, kinesthetic sense, posture, motor skill, proprioception of scapular, scapulothoracic and UE control with self care, reaching, carrying, lifting, house/yardwork, driving/computer work      Manual Treatments:  PROM / STM / Oscillations-Mobs:  G-I, II, III, IV (PA's, Inf., Post.)  [] (99657) Provided manual therapy to mobilize soft tissue/joints of cervical/CT, scapular GHJ and UE for the purpose of modulating pain, promoting relaxation,  increasing ROM, reducing/eliminating soft tissue swelling/inflammation/restriction, improving soft tissue extensibility and allowing for proper ROM for normal function with self care, reaching, carrying, lifting, house/yardwork, driving/computer work    Modalities:  Pt denied ice    Charges:  Timed Code Treatment Minutes: 48   Total Treatment Minutes: 62     [] EVAL (LOW) 85184   [] EVAL (MOD) 20262   [] EVAL (HIGH) 82337   [] RE-EVAL   [x] KH(44992) x   1  [] IONTO  [x] NMR (78910) x 1    [] VASO  [] Manual (00390) x      [] Other:  [x] TA x 1     [] Mech Traction (36762)  [] ES(attended) (49426)      [] ES (un) (26036):       GOALS:  Patient stated goal:   1. Have full and pain free function of his shoulder   [] Progressing: [] Met: [] Not Met: [] Adjusted     Therapist goals for Patient:   Short Term Goals: To be achieved in: 2 weeks  1. Independent in HEP and progression per patient tolerance, in order to prevent re-injury. [] Progressing: [] Met: [] Not Met: [] Adjusted   2.  Patient will have a decrease in pain to overhead strengthening as tolerated today for return to work activities. He had some fatigue but no pain. Continue to progress as tolerated. Return to Play: (if applicable)   []  Stage 1: Intro to Strength   []  Stage 2: Return to Run and Strength   []  Stage 3: Return to Jump and Strength   []  Stage 4: Dynamic Strength and Agility   []  Stage 5: Sport Specific Training     []  Ready to Return to Play, Meets All Above Stages   []  Not Ready for Return to Sports   Comments:                               PLAN: See eval  [] Continue per plan of care [] Alter current plan (see comments above)  [x] Plan of care initiated [] Hold pending MD visit [] Discharge  Note: If patient does not return for scheduled/ recommended follow up visits, this note will serve as a discharge from care along with most recent update on progress. Reviewed insurance benefits for physical therapy in an outpatient hospital based setting with the patient, including deductible and allowable visit number. Pt was informed of possible out of pocket costs, as well as, informed of other service options such as the [employer] program to assist with cost and/or limited visit number.      Electronically signed by:    Lakeshia Aparicio, PT, DPT, Cert DN

## 2020-01-07 ENCOUNTER — HOSPITAL ENCOUNTER (OUTPATIENT)
Dept: PHYSICAL THERAPY | Age: 40
Setting detail: THERAPIES SERIES
Discharge: HOME | End: 2020-01-07
Payer: COMMERCIAL

## 2020-01-07 ENCOUNTER — OFFICE VISIT (OUTPATIENT)
Dept: ORTHOPEDIC SURGERY | Age: 40
End: 2020-01-07

## 2020-01-07 VITALS — HEIGHT: 68 IN | BODY MASS INDEX: 31.83 KG/M2 | RESPIRATION RATE: 12 BRPM | WEIGHT: 210 LBS

## 2020-01-07 PROCEDURE — 99024 POSTOP FOLLOW-UP VISIT: CPT | Performed by: ORTHOPAEDIC SURGERY

## 2020-01-07 PROCEDURE — 97110 THERAPEUTIC EXERCISES: CPT

## 2020-01-07 PROCEDURE — 97112 NEUROMUSCULAR REEDUCATION: CPT

## 2020-01-07 PROCEDURE — 97530 THERAPEUTIC ACTIVITIES: CPT

## 2020-01-07 NOTE — PROGRESS NOTES
Marc Ahumada returns today to follow-up his right shoulder. He status post arthroscopy with decompression, Hilda, and bicep tenodesis. Overall is doing well and reports no pain. He is concerned about regaining his strength. Examination today shows no significant restriction in motion. He has no tenderness. Incisions are well-healed. Strength is 4/5 in all planes. Neurologic and vascular exams are normal.    Assessment: Making good progress status post right shoulder surgery. Plan: He will continue with therapy with an emphasis on strength. He can work but no overhead work and no use of the right arm greater than 5 pounds. Follow-up with me in a month. This note was created using voice recognition software. It has been proofread, but occasionally errors remain. Please disregard these errors. They will be corrected as they are noted.

## 2020-01-07 NOTE — FLOWSHEET NOTE
[facility], [place]      Physical Therapy Daily Treatment Note  Date:  2020    Patient Name:  Jordan Quintanilla    :  1980  MRN: [id number]  Medical/Treatment Diagnosis Information:  · Diagnosis: M75.41 (ICD-10-CM) - Impingement syndrome of right shoulder region  · Treatment Diagnosis: M75.41 (ICD-10-CM) - Impingement syndrome of right shoulder region  Insurance/Certification information:  PT Insurance Information: [employer]  Physician Information:  Referring Practitioner: Dr. Katherin Weeks  Has the plan of care been signed (Y/N):        []  Yes  [x]  No     Date of Patient follow up with Physician: 19      Is this a Progress Report:     []  Yes  [x]  No        Progress report will be due (10 Rx or 30 days whichever is less):       Recertification will be due (POC Duration  / 90 days whichever is less): 19         Visit # Insurance Allowable Auth Required   2 (10 used 2019) 30 []  Yes [x]  No        Functional Scale: UEFI: 85% disability  Date assessed: 19      Latex Allergy:  [x]NO      []YES   Preferred Language for Healthcare:   [x]English       []other:    Pain level:  2/10     SUBJECTIVE:  Patient is 9 weeks s/p. Pt notes he is doing well, just saw MD who notes he can return to work on a light duty with no lifting overhead or greater than 5lbs with right arm.      OBJECTIVE:    Observation:    Test measurements:              ROM PROM AROM  Comment     L R L R 19     Flexion       170 wall slides     Abduction      175 wall slides     ER        93 wand     IR        L1-2with rope      Other             Other                    Strength L R  Comment   Flexion   Not tested d/t post-op status     Abduction         ER         IR         Supraspinatus         Upper Trap         Lower Trap         Mid Trap         Rhomboids         Biceps         Triceps         Horizontal Abduction         Horizontal Adduction         Lats       RESTRICTIONS/PRECAUTIONS: Active elbow flexion 4-6 weeks    Exercises/Interventions:   Exercise/Equipment Resistance/Repetitions Other comments   Aerobic Conditioning     Aerodyne          Stretching/PROM     Wand 10x10 ER supine @ 90 abd       Wall slides  10x10 flexion, scaption     UE McMillan        PROM Elbow flexion 10x10     BB IR 10x10 rope     SL IR     Pec doorway stretch     CBA stretch     UT stretch              LS stretch     Isometrics     Retraction          Weight shift     Flexion           Internal Rotation     Biceps     Triceps          PRE's     Flexion     Abduction     External Rotation     Internal Rotation     Shrugs     EXT     Reverse Flys     Serratus 3x10 wall push up plus     Horizontal Abd with ER     Biceps 3x10 5#    Triceps     Retraction     Towel squeezes     Cable Column/Theraband     External Rotation 3x10 blue 90-90      Internal Rotation 3x10 black 90-90     Shrugs     Lats     Ext 3x10 silver    Flex     Scapular Retraction 3x10 silver    BIC     TRIC     PNF 3x10 black D2 extension  3x10 black D1 flexion          Dynamic Stability     BOW  [id number] 90-90 dribble     BB  [id number] IR/ER  3x30 Flexion     Shoulder taps 3x10 on edge of plinth     Tball pushup hold  10x10     Plyoback                Therapeutic Exercise and NMR EXR  [] (56934) Provided verbal/tactile cueing for activities related to strengthening, flexibility, endurance, ROM  for improvements in scapular, scapulothoracic and UE control with self care, reaching, carrying, lifting, house/yardwork, driving/computer work.    [] (30305) Provided verbal/tactile cueing for activities related to improving balance, coordination, kinesthetic sense, posture, motor skill, proprioception  to assist with  scapular, scapulothoracic and UE control with self care, reaching, carrying, lifting, house/yardwork, driving/computer work.     Therapeutic Activities:    [] (73732 or 40085) Provided verbal/tactile cueing for activities related to improving balance, coordination, kinesthetic sense, posture, motor skill, proprioception and motor activation to allow for proper function of scapular, scapulothoracic and UE control with self care, carrying, lifting, driving/computer work. Home Exercise Program:    [x] (83425) Reviewed/Progressed HEP activities related to strengthening, flexibility, endurance, ROM of scapular, scapulothoracic and UE control with self care, reaching, carrying, lifting, house/yardwork, driving/computer work  [] (21973) Reviewed/Progressed HEP activities related to improving balance, coordination, kinesthetic sense, posture, motor skill, proprioception of scapular, scapulothoracic and UE control with self care, reaching, carrying, lifting, house/yardwork, driving/computer work      Manual Treatments:  PROM / STM / Oscillations-Mobs:  G-I, II, III, IV (PA's, Inf., Post.)  [] (18226) Provided manual therapy to mobilize soft tissue/joints of cervical/CT, scapular GHJ and UE for the purpose of modulating pain, promoting relaxation,  increasing ROM, reducing/eliminating soft tissue swelling/inflammation/restriction, improving soft tissue extensibility and allowing for proper ROM for normal function with self care, reaching, carrying, lifting, house/yardwork, driving/computer work    Modalities:  Pt denied ice    Charges:  Timed Code Treatment Minutes: 54   Total Treatment Minutes: 62     [] EVAL (LOW) 75358   [] EVAL (MOD) 71268   [] EVAL (HIGH) 37603   [] RE-EVAL   [x] FF(90494) x   1  [] IONTO  [x] NMR (79526) x 1    [] VASO  [] Manual (20603) x      [] Other:  [x] TA x 2     [] Mech Traction (14055)  [] ES(attended) (18012)      [] ES (un) (05983):       GOALS:  Patient stated goal:   1. Have full and pain free function of his shoulder   [] Progressing: [] Met: [] Not Met: [] Adjusted     Therapist goals for Patient:   Short Term Goals: To be achieved in: 2 weeks  1.  Independent in HEP and progression per patient tolerance, in order to prevent re-injury. [] Progressing: [] Met: [] Not Met: [] Adjusted   2. Patient will have a decrease in pain to facilitate improvement in movement, function, and ADLs as indicated by Functional Deficits. [] Progressing: [] Met: [] Not Met: [] Adjusted     Long Term Goals: To be achieved in: 8 weeks  1. Disability index score of 50% or less for the UEFI  to assist with reaching prior level of function. [] Progressing: [] Met: [] Not Met: [] Adjusted  2. Patient will demonstrate increased AROM to 150 degrees of flexion to allow for proper joint functioning as indicated by patients Functional Deficits. [] Progressing: [] Met: [] Not Met: [] Adjusted  3. Patient will demonstrate an increase in Strength to 4-/5 shoulder flexion and abduction to allow for proper functional mobility as indicated by patients Functional Deficits. [] Progressing: [] Met: [] Not Met: [] Adjusted  4. Patient will return to carrying 20 pound weight functional activities without increased symptoms or restriction. [] Progressing: [] Met: [] Not Met: [] Adjusted  5. Patient will be able to tuck in his shirt behind his back to display sufficient motion of his GHJ. [] Progressing: [] Met: [] Not Met: [] Adjusted                         Overall Progression Towards Functional goals/ Treatment Progress Update:  [] Patient is progressing as expected towards functional goals listed. [] Progression is slowed due to complexities/Impairments listed. [] Progression has been slowed due to co-morbidities.   [x] Plan just implemented, too soon to assess goals progression <30days   [] Goals require adjustment due to lack of progress  [] Patient is not progressing as expected and requires additional follow up with physician  [] Other    Prognosis for POC: [x] Good [] Fair  [] Poor      Patient requires continued skilled intervention: [x] Yes  [] No    Treatment/Activity Tolerance:  [x] Patient able to complete treatment  [] Patient limited by fatigue  [] Patient limited by pain     [] Patient limited by other medical complications  [] Other:  Pt able to progress resistance today with some mild fatigue but no pain. We discussed returning to the gym and using machines as he can but to avoid any overhead pressing. Continue as tolerated. Return to Play: (if applicable)   []  Stage 1: Intro to Strength   []  Stage 2: Return to Run and Strength   []  Stage 3: Return to Jump and Strength   []  Stage 4: Dynamic Strength and Agility   []  Stage 5: Sport Specific Training     []  Ready to Return to Play, Meets All Above Stages   []  Not Ready for Return to Sports   Comments:                               PLAN: See eval  [] Continue per plan of care [] Alter current plan (see comments above)  [x] Plan of care initiated [] Hold pending MD visit [] Discharge  Note: If patient does not return for scheduled/ recommended follow up visits, this note will serve as a discharge from care along with most recent update on progress. Reviewed insurance benefits for physical therapy in an outpatient hospital based setting with the patient, including deductible and allowable visit number. Pt was informed of possible out of pocket costs, as well as, informed of other service options such as the [facility] program to assist with cost and/or limited visit number.      Electronically signed by:    Alia Garcia, PT, DPT, Cert FADY

## 2020-01-07 NOTE — LETTER
[address] [place] [address]   [address] [address]  [place], [facility]  Phone: [phone]  Fax: [phone]    Dipti Elizondo. MD Ayad           1/7/20            Patient: Georgi Conklin         YOB: 1980         Date of Visit: 1/7/20           To Whom It May Concern: It is my medical opinion that Georgi Conklin is still recovering from right shoulder surgery. He is currently capable of work with the limitation of no overhead work with the right arm and no lifting greater than 5 pounds with the right arm. He will continue with therapy and I will reassess him in 1 month. If you have any questions or concerns, please don't hesitate to call. Sincerely,              Dipti Elizondo.  Raj Poon MD

## 2020-01-09 ENCOUNTER — HOSPITAL ENCOUNTER (OUTPATIENT)
Dept: PHYSICAL THERAPY | Age: 40
Setting detail: THERAPIES SERIES
Discharge: HOME | End: 2020-01-09
Payer: COMMERCIAL

## 2020-01-09 PROCEDURE — 97110 THERAPEUTIC EXERCISES: CPT

## 2020-01-09 PROCEDURE — 97112 NEUROMUSCULAR REEDUCATION: CPT

## 2020-01-09 PROCEDURE — 97530 THERAPEUTIC ACTIVITIES: CPT

## 2020-01-09 NOTE — FLOWSHEET NOTE
sense, posture, motor skill, proprioception and motor activation to allow for proper function of scapular, scapulothoracic and UE control with self care, carrying, lifting, driving/computer work. Home Exercise Program:    [x] (74711) Reviewed/Progressed HEP activities related to strengthening, flexibility, endurance, ROM of scapular, scapulothoracic and UE control with self care, reaching, carrying, lifting, house/yardwork, driving/computer work  [] (89699) Reviewed/Progressed HEP activities related to improving balance, coordination, kinesthetic sense, posture, motor skill, proprioception of scapular, scapulothoracic and UE control with self care, reaching, carrying, lifting, house/yardwork, driving/computer work      Manual Treatments:  PROM / STM / Oscillations-Mobs:  G-I, II, III, IV (PA's, Inf., Post.)  [] (97703) Provided manual therapy to mobilize soft tissue/joints of cervical/CT, scapular GHJ and UE for the purpose of modulating pain, promoting relaxation,  increasing ROM, reducing/eliminating soft tissue swelling/inflammation/restriction, improving soft tissue extensibility and allowing for proper ROM for normal function with self care, reaching, carrying, lifting, house/yardwork, driving/computer work    Modalities:  Pt denied ice    Charges:  Timed Code Treatment Minutes: 55   Total Treatment Minutes: 60     [] EVAL (LOW) 77682   [] EVAL (MOD) 76143   [] EVAL (HIGH) 13535   [] RE-EVAL   [x] DY(91689) x   1  [] IONTO  [x] NMR (24074) x 1    [] VASO  [] Manual (53998) x      [] Other:  [x] TA x 2     [] Mech Traction (03920)  [] ES(attended) (05259)      [] ES (un) (25703):       GOALS:  Patient stated goal:   1. Have full and pain free function of his shoulder   [] Progressing: [] Met: [] Not Met: [] Adjusted     Therapist goals for Patient:   Short Term Goals: To be achieved in: 2 weeks  1. Independent in HEP and progression per patient tolerance, in order to prevent re-injury.      [] Progressing: [] Met: [] Not Met: [] Adjusted   2. Patient will have a decrease in pain to facilitate improvement in movement, function, and ADLs as indicated by Functional Deficits. [] Progressing: [] Met: [] Not Met: [] Adjusted     Long Term Goals: To be achieved in: 8 weeks  1. Disability index score of 50% or less for the UEFI  to assist with reaching prior level of function. [] Progressing: [] Met: [] Not Met: [] Adjusted  2. Patient will demonstrate increased AROM to 150 degrees of flexion to allow for proper joint functioning as indicated by patients Functional Deficits. [] Progressing: [] Met: [] Not Met: [] Adjusted  3. Patient will demonstrate an increase in Strength to 4-/5 shoulder flexion and abduction to allow for proper functional mobility as indicated by patients Functional Deficits. [] Progressing: [] Met: [] Not Met: [] Adjusted  4. Patient will return to carrying 20 pound weight functional activities without increased symptoms or restriction. [] Progressing: [] Met: [] Not Met: [] Adjusted  5. Patient will be able to tuck in his shirt behind his back to display sufficient motion of his GHJ. [] Progressing: [] Met: [] Not Met: [] Adjusted                         Overall Progression Towards Functional goals/ Treatment Progress Update:  [] Patient is progressing as expected towards functional goals listed. [] Progression is slowed due to complexities/Impairments listed. [] Progression has been slowed due to co-morbidities.   [x] Plan just implemented, too soon to assess goals progression <30days   [] Goals require adjustment due to lack of progress  [] Patient is not progressing as expected and requires additional follow up with physician  [] Other    Prognosis for POC: [x] Good [] Fair  [] Poor      Patient requires continued skilled intervention: [x] Yes  [] No    Treatment/Activity Tolerance:  [x] Patient able to complete treatment  [] Patient limited by fatigue  [] Patient limited by pain     [] Patient limited by other medical complications  [] Other:  Initiated treatment with shoulder IR, ER, flex, scaption stretches to improve R shoulder ROM. Progressed periscapular strengthening to include serratus wall slides with green min-band as well as RC strength with wall clocks with appropriate fatigue noted following. Continued with resisted and dynamic stability exercises to improve RC and periscapular strength with pt demonstrating good coordination control throughout. Return to Play: (if applicable)   []  Stage 1: Intro to Strength   []  Stage 2: Return to Run and Strength   []  Stage 3: Return to Jump and Strength   []  Stage 4: Dynamic Strength and Agility   []  Stage 5: Sport Specific Training     []  Ready to Return to Play, Meets All Above Stages   []  Not Ready for Return to Sports   Comments:                               PLAN: See eval  [] Continue per plan of care [] Alter current plan (see comments above)  [x] Plan of care initiated [] Hold pending MD visit [] Discharge  Note: If patient does not return for scheduled/ recommended follow up visits, this note will serve as a discharge from care along with most recent update on progress. Reviewed insurance benefits for physical therapy in an outpatient hospital based setting with the patient, including deductible and allowable visit number. Pt was informed of possible out of pocket costs, as well as, informed of other service options such as the [facility] program to assist with cost and/or limited visit number.      Electronically signed by:    Sheila Fuller, PT, DPT, Cert DN

## 2020-01-14 ENCOUNTER — HOSPITAL ENCOUNTER (OUTPATIENT)
Dept: PHYSICAL THERAPY | Age: 40
Setting detail: THERAPIES SERIES
Discharge: HOME | End: 2020-01-14
Payer: COMMERCIAL

## 2020-01-14 PROCEDURE — 97112 NEUROMUSCULAR REEDUCATION: CPT

## 2020-01-14 PROCEDURE — 97530 THERAPEUTIC ACTIVITIES: CPT

## 2020-01-14 PROCEDURE — 97110 THERAPEUTIC EXERCISES: CPT

## 2020-01-14 NOTE — FLOWSHEET NOTE
improving balance, coordination, kinesthetic sense, posture, motor skill, proprioception and motor activation to allow for proper function of scapular, scapulothoracic and UE control with self care, carrying, lifting, driving/computer work. Home Exercise Program:    [x] (81917) Reviewed/Progressed HEP activities related to strengthening, flexibility, endurance, ROM of scapular, scapulothoracic and UE control with self care, reaching, carrying, lifting, house/yardwork, driving/computer work  [] (51935) Reviewed/Progressed HEP activities related to improving balance, coordination, kinesthetic sense, posture, motor skill, proprioception of scapular, scapulothoracic and UE control with self care, reaching, carrying, lifting, house/yardwork, driving/computer work      Manual Treatments:  PROM / STM / Oscillations-Mobs:  G-I, II, III, IV (PA's, Inf., Post.)  [] (84875) Provided manual therapy to mobilize soft tissue/joints of cervical/CT, scapular GHJ and UE for the purpose of modulating pain, promoting relaxation,  increasing ROM, reducing/eliminating soft tissue swelling/inflammation/restriction, improving soft tissue extensibility and allowing for proper ROM for normal function with self care, reaching, carrying, lifting, house/yardwork, driving/computer work    Modalities:  Pt denied ice    Charges:  Timed Code Treatment Minutes: 55   Total Treatment Minutes: 60     [] EVAL (LOW) 22009   [] EVAL (MOD) 21190   [] EVAL (HIGH) 37374   [] RE-EVAL   [x] RK(48673) x   1  [] IONTO  [x] NMR (20241) x 2    [] VASO  [] Manual (42661) x      [] Other:  [x] TA x 1     [] Mech Traction (64278)  [] ES(attended) (29861)      [] ES (un) (60982):       GOALS:  Patient stated goal:   1. Have full and pain free function of his shoulder   [] Progressing: [] Met: [] Not Met: [] Adjusted     Therapist goals for Patient:   Short Term Goals: To be achieved in: 2 weeks  1.  Independent in HEP and progression per patient tolerance, in order to prevent re-injury. [] Progressing: [] Met: [] Not Met: [] Adjusted   2. Patient will have a decrease in pain to facilitate improvement in movement, function, and ADLs as indicated by Functional Deficits. [] Progressing: [] Met: [] Not Met: [] Adjusted     Long Term Goals: To be achieved in: 8 weeks  1. Disability index score of 50% or less for the UEFI  to assist with reaching prior level of function. [] Progressing: [] Met: [] Not Met: [] Adjusted  2. Patient will demonstrate increased AROM to 150 degrees of flexion to allow for proper joint functioning as indicated by patients Functional Deficits. [] Progressing: [] Met: [] Not Met: [] Adjusted  3. Patient will demonstrate an increase in Strength to 4-/5 shoulder flexion and abduction to allow for proper functional mobility as indicated by patients Functional Deficits. [] Progressing: [] Met: [] Not Met: [] Adjusted  4. Patient will return to carrying 20 pound weight functional activities without increased symptoms or restriction. [] Progressing: [] Met: [] Not Met: [] Adjusted  5. Patient will be able to tuck in his shirt behind his back to display sufficient motion of his GHJ. [] Progressing: [] Met: [] Not Met: [] Adjusted                         Overall Progression Towards Functional goals/ Treatment Progress Update:  [] Patient is progressing as expected towards functional goals listed. [] Progression is slowed due to complexities/Impairments listed. [] Progression has been slowed due to co-morbidities.   [x] Plan just implemented, too soon to assess goals progression <30days   [] Goals require adjustment due to lack of progress  [] Patient is not progressing as expected and requires additional follow up with physician  [] Other    Prognosis for POC: [x] Good [] Fair  [] Poor      Patient requires continued skilled intervention: [x] Yes  [] No    Treatment/Activity Tolerance:  [x] Patient able to complete treatment  [] Patient limited in agreement to be treated by student physical therapist with licensed physical therapist present.         Elisabeth Juarez, PT, DPT, Cert DN

## 2020-01-16 ENCOUNTER — HOSPITAL ENCOUNTER (OUTPATIENT)
Dept: PHYSICAL THERAPY | Age: 40
Setting detail: THERAPIES SERIES
Discharge: HOME | End: 2020-01-16
Payer: COMMERCIAL

## 2020-01-16 PROCEDURE — 97110 THERAPEUTIC EXERCISES: CPT

## 2020-01-16 PROCEDURE — 97530 THERAPEUTIC ACTIVITIES: CPT

## 2020-01-16 PROCEDURE — 97112 NEUROMUSCULAR REEDUCATION: CPT

## 2020-01-21 ENCOUNTER — HOSPITAL ENCOUNTER (OUTPATIENT)
Dept: PHYSICAL THERAPY | Age: 40
Setting detail: THERAPIES SERIES
Discharge: HOME | End: 2020-01-21
Payer: COMMERCIAL

## 2020-01-21 PROCEDURE — 97112 NEUROMUSCULAR REEDUCATION: CPT

## 2020-01-21 PROCEDURE — 97530 THERAPEUTIC ACTIVITIES: CPT

## 2020-01-21 PROCEDURE — 97110 THERAPEUTIC EXERCISES: CPT

## 2020-01-21 NOTE — FLOWSHEET NOTE
weeks    Exercises/Interventions:   Exercise/Equipment Resistance/Repetitions Other comments   Aerobic Conditioning     Aerodyne          Stretching/PROM           Wall slides  10x10 flexion, scaption     UE [place]        PROM Elbow flexion      BB IR 10x10 rope     SL IR     Pec doorway stretch 10x10    CBA stretch     UT stretch              LS stretch     Isometrics     Retraction          Weight shift     Flexion           Internal Rotation     Biceps     Triceps          PRE's     Flexion 3x10 2#    Abduction 3x10 2#    External Rotation     Internal Rotation     Shrugs     EXT     Reverse Flys     Serratus    3x10 serratus wall slides    Green mini-band   Horizontal Abd with ER     Biceps     Triceps     Rhomboids 3x10; 2 sec hold Eccentric focus   Mid trap prone T 3x10; 2 sec hold  Eccentric focus   Cable Column/Theraband     External Rotation   B ER back band    Internal Rotation 3x10 black 90-90     Shrugs     Lats     Ext 3x10 silver    Flex     Scapular Retraction 3x10 silver    BIC     TRIC     PNF   3x10 black D2 flexion  *pain at elbow after D2 ext        Dynamic Stability     BOW      BB  [id number] IR/ER  3x30 Flexion     Wall [id number] miniband    Shoulder taps 3x10 on edge of functional box     Tball pushup hold       Plyoback          Arm Bike 5 mins          Therapeutic Exercise and NMR EXR  [] (89701) Provided verbal/tactile cueing for activities related to strengthening, flexibility, endurance, ROM  for improvements in scapular, scapulothoracic and UE control with self care, reaching, carrying, lifting, house/yardwork, driving/computer work.    [] (03791) Provided verbal/tactile cueing for activities related to improving balance, coordination, kinesthetic sense, posture, motor skill, proprioception  to assist with  scapular, scapulothoracic and UE control with self care, reaching, carrying, lifting, house/yardwork, driving/computer work.     Therapeutic Activities:    [] (44076 or 73464) Provided verbal/tactile cueing for activities related to improving balance, coordination, kinesthetic sense, posture, motor skill, proprioception and motor activation to allow for proper function of scapular, scapulothoracic and UE control with self care, carrying, lifting, driving/computer work. Home Exercise Program:    [x] (33192) Reviewed/Progressed HEP activities related to strengthening, flexibility, endurance, ROM of scapular, scapulothoracic and UE control with self care, reaching, carrying, lifting, house/yardwork, driving/computer work  [] (89508) Reviewed/Progressed HEP activities related to improving balance, coordination, kinesthetic sense, posture, motor skill, proprioception of scapular, scapulothoracic and UE control with self care, reaching, carrying, lifting, house/yardwork, driving/computer work      Manual Treatments:  PROM / STM / Oscillations-Mobs:  G-I, II, III, IV (PA's, Inf., Post.)  [] (96236) Provided manual therapy to mobilize soft tissue/joints of cervical/CT, scapular GHJ and UE for the purpose of modulating pain, promoting relaxation,  increasing ROM, reducing/eliminating soft tissue swelling/inflammation/restriction, improving soft tissue extensibility and allowing for proper ROM for normal function with self care, reaching, carrying, lifting, house/yardwork, driving/computer work    Modalities:  Pt denied ice    Charges:  Timed Code Treatment Minutes: 60   Total Treatment Minutes: 70     [] EVAL (LOW) 84558   [] EVAL (MOD) 59002   [] EVAL (HIGH) 32400   [] RE-EVAL   [x] CW(14918) x   1  [] IONTO  [x] NMR (82931) x 1    [] VASO  [] Manual (12098) x      [] Other:  [x] TA x 2     [] Mech Traction (91671)  [] ES(attended) (52275)      [] ES (un) (38476):       GOALS:  Patient stated goal:   1. Have full and pain free function of his shoulder   [] Progressing: [] Met: [] Not Met: [] Adjusted     Therapist goals for Patient:   Short Term Goals: To be achieved in: 2 weeks  1. Independent in HEP and progression per patient tolerance, in order to prevent re-injury. [] Progressing: [] Met: [] Not Met: [] Adjusted   2. Patient will have a decrease in pain to facilitate improvement in movement, function, and ADLs as indicated by Functional Deficits. [] Progressing: [] Met: [] Not Met: [] Adjusted     Long Term Goals: To be achieved in: 8 weeks  1. Disability index score of 50% or less for the UEFI  to assist with reaching prior level of function. [] Progressing: [] Met: [] Not Met: [] Adjusted  2. Patient will demonstrate increased AROM to 150 degrees of flexion to allow for proper joint functioning as indicated by patients Functional Deficits. [] Progressing: [] Met: [] Not Met: [] Adjusted  3. Patient will demonstrate an increase in Strength to 4-/5 shoulder flexion and abduction to allow for proper functional mobility as indicated by patients Functional Deficits. [] Progressing: [] Met: [] Not Met: [] Adjusted  4. Patient will return to carrying 20 pound weight functional activities without increased symptoms or restriction. [] Progressing: [] Met: [] Not Met: [] Adjusted  5. Patient will be able to tuck in his shirt behind his back to display sufficient motion of his GHJ. [] Progressing: [] Met: [] Not Met: [] Adjusted                         Overall Progression Towards Functional goals/ Treatment Progress Update:  [] Patient is progressing as expected towards functional goals listed. [] Progression is slowed due to complexities/Impairments listed. [] Progression has been slowed due to co-morbidities.   [x] Plan just implemented, too soon to assess goals progression <30days   [] Goals require adjustment due to lack of progress  [] Patient is not progressing as expected and requires additional follow up with physician  [] Other    Prognosis for POC: [x] Good [] Fair  [] Poor      Patient requires continued skilled intervention: [x] Yes  [] No    Treatment/Activity Tolerance:  [x] Patient able to complete treatment  [] Patient limited by fatigue  [] Patient limited by pain     [] Patient limited by other medical complications  [] Other: Treatment initiated with arm bike warmup and stretches prior to performing strengthening exercises. Pt was able to progress weights and sets with both RC and periscapular strengthening activities with good form and posture noted throughout. Plan to continue to progress scapular stability exercises in order to initiate safe overhead progressions. Will reassess  RC strength next session. Return to Play: (if applicable)   []  Stage 1: Intro to Strength   []  Stage 2: Return to Run and Strength   []  Stage 3: Return to Jump and Strength   []  Stage 4: Dynamic Strength and Agility   []  Stage 5: Sport Specific Training     []  Ready to Return to Play, Meets All Above Stages   []  Not Ready for Return to Sports   Comments:                               PLAN: See eval  [] Continue per plan of care [] Alter current plan (see comments above)  [x] Plan of care initiated [] Hold pending MD visit [] Discharge  Note: If patient does not return for scheduled/ recommended follow up visits, this note will serve as a discharge from care along with most recent update on progress. Reviewed insurance benefits for physical therapy in an outpatient hospital based setting with the patient, including deductible and allowable visit number. Pt was informed of possible out of pocket costs, as well as, informed of other service options such as the [facility], [place], operated by [facility] program to assist with cost and/or limited visit number. Electronically signed by:    Parker Jacob, SPT  Therapist was present, directed the patient's care, made skilled judgement, and was responsible for assessment and treatment of the patient. Patient was in agreement to be treated by student physical therapist with licensed physical therapist present.         Clair Jarrett, PT, DPT, Cert DN

## 2020-01-23 ENCOUNTER — HOSPITAL ENCOUNTER (OUTPATIENT)
Dept: PHYSICAL THERAPY | Age: 40
Setting detail: THERAPIES SERIES
Discharge: HOME | End: 2020-01-23
Payer: COMMERCIAL

## 2020-01-23 PROCEDURE — 97530 THERAPEUTIC ACTIVITIES: CPT

## 2020-01-23 PROCEDURE — 97112 NEUROMUSCULAR REEDUCATION: CPT

## 2020-01-23 PROCEDURE — 97110 THERAPEUTIC EXERCISES: CPT

## 2020-01-23 NOTE — PLAN OF CARE
[facility] [place]   Physical Therapy Re-Certification Plan of Kala Colon      Dear  Dr. Raj Poon,    We had the pleasure of treating the following patient for physical therapy services at [address] [place] [address]. A summary of our findings can be found in the updated assessment below. This includes our plan of care. If you have any questions or concerns regarding these findings, please do not hesitate to contact me at the office phone number checked above. Thank you for the referral.     Physician Signature:________________________________Date:__________________  By signing above (or electronic signature), therapists plan is approved by physician    Date Range Of Visits: 19  Total Visits to Date: 16  Overall Response to Treatment:   [x]Patient is responding well to treatment and improvement is noted with regards  to goals   []Patient should continue to improve in reasonable time if they continue HEP   []Patient has plateaued and is no longer responding to skilled PT intervention    []Patient is getting worse and would benefit from return to referring MD   []Patient unable to adhere to initial POC   []Other: Pt demonstrates improvements in R shoulder pain, ROM and RC and periscapular strength. Pt continues to be limited in functional activities that involve controlling loads overhead and  strength. Continued PT services 1-2x per week for 4-6 weeks indicated to address the above impairments and progress strength and stability with overhead activities for full return to work duties .         Physical Therapy Daily Treatment Note  Date:  2020    Patient Name:  Georgi Conklin    :  1980  MRN: [id number]  Medical/Treatment Diagnosis Information:  · Diagnosis: M75.41 (ICD-10-CM) - Impingement syndrome of right shoulder region  · Treatment Diagnosis: M75.41 (ICD-10-CM) - Impingement syndrome of right shoulder region  Insurance/Certification information:  PT Insurance Information: [employer]  Physician Information:  Referring Practitioner: Dr. Loren Pham  Has the plan of care been signed (Y/N):        []  Yes  [x]  No     Date of Patient follow up with Physician: 1/07/19      Is this a Progress Report:     [x]  Yes  []  No        Progress report will be due (10 Rx or 30 days whichever is less): 7/88/86      Recertification will be due (POC Duration  / 90 days whichever is less): 3/23/20         Visit # Insurance Allowable Auth Required   7 (10 used 2019) 30 []  Yes [x]  No        Functional Scale: UEFI: 85% disability  Date assessed: 11/7/19    Functional Scale: UEFI: 7% disability  Date assessed: 1/23/20      Latex Allergy:  [x]NO      []YES   Preferred Language for Healthcare:   [x]English       []other:    Pain level:  0/10     SUBJECTIVE:  Pt reports he is doing well with no c/o pain. He reports his biggest limitations are overhead activities and twisting motions at the wrist such as opening a jar.     OBJECTIVE:    Observation: 1/23/19   Test measurements:              ROM PROM AROM  Comment     L R L R      Flexion      175     Abduction      175     ER        90     IR        T6      Other             Other                    Strength L R  Comment   Flexion    4+/5     Abduction    4+/5     ER    4+/5     IR    4/5     Supraspinatus         Upper Trap         Lower Trap         Mid Trap   4-/5    Rhomboids   4-/5    Biceps         Triceps         Horizontal Abduction         Horizontal Adduction         Lats             RESTRICTIONS/PRECAUTIONS: Active elbow flexion 4-6 weeks    Exercises/Interventions:   Exercise/Equipment Resistance/Repetitions Other comments   Aerobic Conditioning     Aerodyne          Stretching/PROM           Wall slides      UE [place]        PROM Elbow flexion      BB IR 10x10 rope     SL IR     Pec doorway stretch 10x10    CBA stretch     UT stretch              LS stretch     Isometrics     Retraction          Weight shift Flexion           Internal Rotation     Biceps     Triceps          PRE's     Therband Bar Wring 3x10    Supination/ Pronation  3x10 3#    Flexion 3x10 3#    Abduction 3x10 3#    External Rotation     Internal Rotation     Shrugs     EXT     Reverse Flys     Serratus 3x10 bosu push up plus   3x10 serratus wall slides    Green mini-band   Horizontal Abd with ER     Biceps     Triceps     Rhomboids 3x10 1# ECL   Mid trap prone T 3x10 1# ECL   Cable Column/Theraband     External Rotation    Internal Rotation 3x10 black 90-90     Shrugs     Lats     Ext 3x10 silver    Flex     Scapular Retraction 3x10 silver    BIC     TRIC     PNF            Dynamic Stability     BOW      BB      Wall clocksGreen miniband    Shoulder taps     Tball pushup hold  10x10     Cornelius carry x3 laps    Plyoback          Arm Bike 8 mins          Therapeutic Exercise and NMR EXR  [x] (06796) Provided verbal/tactile cueing for activities related to strengthening, flexibility, endurance, ROM  for improvements in scapular, scapulothoracic and UE control with self care, reaching, carrying, lifting, house/yardwork, driving/computer work. [x] (20049) Provided verbal/tactile cueing for activities related to improving balance, coordination, kinesthetic sense, posture, motor skill, proprioception  to assist with  scapular, scapulothoracic and UE control with self care, reaching, carrying, lifting, house/yardwork, driving/computer work. Therapeutic Activities:    [x] (52173 or 60231) Provided verbal/tactile cueing for activities related to improving balance, coordination, kinesthetic sense, posture, motor skill, proprioception and motor activation to allow for proper function of scapular, scapulothoracic and UE control with self care, carrying, lifting, driving/computer work.      Home Exercise Program:    [x] (63106) Reviewed/Progressed HEP activities related to strengthening, flexibility, endurance, ROM of scapular, scapulothoracic and UE control with self care, reaching, carrying, lifting, house/yardwork, driving/computer work  [] (86531) Reviewed/Progressed HEP activities related to improving balance, coordination, kinesthetic sense, posture, motor skill, proprioception of scapular, scapulothoracic and UE control with self care, reaching, carrying, lifting, house/yardwork, driving/computer work      Manual Treatments:  PROM / STM / Oscillations-Mobs:  G-I, II, III, IV (PA's, Inf., Post.)  [] ([id number]) Provided manual therapy to mobilize soft tissue/joints of cervical/CT, scapular GHJ and UE for the purpose of modulating pain, promoting relaxation,  increasing ROM, reducing/eliminating soft tissue swelling/inflammation/restriction, improving soft tissue extensibility and allowing for proper ROM for normal function with self care, reaching, carrying, lifting, house/yardwork, driving/computer work    Modalities:  Pt denied ice    Charges:  Timed Code Treatment Minutes: 62   Total Treatment Minutes: 70     [] EVAL (LOW) 67010   [] EVAL (MOD) 13464   [] EVAL (HIGH) 66058   [] RE-EVAL   [x] DH(50376) x   1  [] IONTO  [x] NMR (08341) x 1    [] VASO  [] Manual (54007) x      [] Other:  [x] TA x 2     [] Mech Traction (62706)  [] ES(attended) (66512)      [] ES (un) (89103):       GOALS:  Patient stated goal:   1. Have full and pain free function of his shoulder   [x] Progressing: [] Met: [] Not Met: [] Adjusted     Therapist goals for Patient:   Short Term Goals: To be achieved in: 2 weeks  1. Independent in HEP and progression per patient tolerance, in order to prevent re-injury. [] Progressing: [x] Met: [] Not Met: [] Adjusted   2. Patient will have a decrease in pain to facilitate improvement in movement, function, and ADLs as indicated by Functional Deficits. [] Progressing: [x] Met: [] Not Met: [] Adjusted     Long Term Goals: To be achieved in: 8 weeks  1.  Disability index score of 50% or less for the UEFI  to assist with reaching prior level of demonstrating good control throughout. Incorporated supination/pronation strengthening exercises to address weakness with functional gripping activities. Plan to progress open and closed chain strengthening in order to improve functional mobility. Return to Play: (if applicable)   []  Stage 1: Intro to Strength   []  Stage 2: Return to Run and Strength   []  Stage 3: Return to Jump and Strength   []  Stage 4: Dynamic Strength and Agility   []  Stage 5: Sport Specific Training     []  Ready to Return to Play, Meets All Above Stages   []  Not Ready for Return to Sports   Comments:                               PLAN:   [] Continue per plan of care [] Alter current plan (see comments above)  [x] Plan of care initiated [] Hold pending MD visit [] Discharge  Note: If patient does not return for scheduled/ recommended follow up visits, this note will serve as a discharge from care along with most recent update on progress. Reviewed insurance benefits for physical therapy in an outpatient hospital based setting with the patient, including deductible and allowable visit number. Pt was informed of possible out of pocket costs, as well as, informed of other service options such as the [facility] program to assist with cost and/or limited visit number. Electronically signed by:    Corona Bond, SPT  Therapist was present, directed the patient's care, made skilled judgement, and was responsible for assessment and treatment of the patient. Patient was in agreement to be treated by student physical therapist with licensed physical therapist present.         David Sommer, PT, DPT, Cert DN

## 2020-01-28 ENCOUNTER — HOSPITAL ENCOUNTER (OUTPATIENT)
Dept: PHYSICAL THERAPY | Age: 40
Setting detail: THERAPIES SERIES
Discharge: HOME | End: 2020-01-28
Payer: COMMERCIAL

## 2020-01-28 PROCEDURE — 97110 THERAPEUTIC EXERCISES: CPT

## 2020-01-28 PROCEDURE — 97530 THERAPEUTIC ACTIVITIES: CPT

## 2020-01-28 PROCEDURE — 97112 NEUROMUSCULAR REEDUCATION: CPT

## 2020-01-28 NOTE — FLOWSHEET NOTE
[facility], [place]          Physical Therapy Daily Treatment Note  Date:  2020    Patient Name:  Carlota Severe    :  1980  MRN: [id number]  Medical/Treatment Diagnosis Information:  · Diagnosis: M75.41 (ICD-10-CM) - Impingement syndrome of right shoulder region  · Treatment Diagnosis: M75.41 (ICD-10-CM) - Impingement syndrome of right shoulder region  Insurance/Certification information:  PT Insurance Information: [employer]  Physician Information:  Referring Practitioner: Dr. Loren Pham  Has the plan of care been signed (Y/N):        []  Yes  [x]  No     Date of Patient follow up with Physician: 19      Is this a Progress Report:     [x]  Yes  []  No        Progress report will be due (10 Rx or 30 days whichever is less):       Recertification will be due (POC Duration  / 90 days whichever is less): 3/23/20         Visit # Insurance Allowable Auth Required   8 (10 used 2019) 30 []  Yes [x]  No        Functional Scale: UEFI: 85% disability  Date assessed: 19    Functional Scale: UEFI: 7% disability  Date assessed: 20      Latex Allergy:  [x]NO      []YES   Preferred Language for Healthcare:   [x]English       []other:    Pain level:  0/10     SUBJECTIVE:   Pt is scheduled to return to work on - after his next MD follow up. He notes that he has had no issues with shoulder lately.  He is able to perform all household/ duties but otherwise he is not going out of his way to \"test the shoulder\"     OBJECTIVE:    Observation: 19   Test measurements:              ROM PROM AROM  Comment     L R L R      Flexion      175     Abduction      175     ER        90     IR        T6      Other             Other                    Strength L R  Comment   Flexion    4+/5     Abduction    4+/5     ER    4+/5     IR    4/5     Supraspinatus         Upper Trap         Lower Trap         Mid Trap   4-/5    Rhomboids   4-/5    Biceps         Triceps       Horizontal Abduction         Horizontal Adduction         Lats             RESTRICTIONS/PRECAUTIONS: Active elbow flexion 4-6 weeks    Exercises/Interventions:   Exercise/Equipment Resistance/Repetitions Other comments   Aerobic Conditioning     Aerodyne          Stretching/PROM           Wall slides      UE [place]        PROM Elbow flexion      BB IR 10x10 rope     SL IR     Pec doorway stretch 10x10    CBA stretch     UT stretch              LS stretch     Isometrics     Retraction          Weight shift     Flexion           Internal Rotation     Biceps     Triceps          PRE's     Therband Bar Wring 3x10    Supination/ Pronation  3x10 3#    Flexion 3x10 4#    Abduction 3x10 4#    External Rotation     Internal Rotation     Shrugs     EXT     Reverse Flys     Serratus 3x10 bosu push up plus   3x10 serratus wall slides    Green mini-band   Horizontal Abd with ER     Biceps     Triceps     Rhomboids 3x10 2# ECL   Mid trap prone T 3x10 2# ECL   Cable Column/Theraband     External Rotation 3x10 black 90-90     Internal Rotation 3x10 black 90-90     Shrugs     Lats     Ext 3x10 silver    Flex     Scapular Retraction 3x10 silver    BIC     TRIC     PNF            Dynamic Stability     BOW      BB      Wall clocksGreen miniband    Shoulder taps     Tball pushup hold  10x10     [place] carry x3 laps    Plyoback          Arm Bike 8 mins          Therapeutic Exercise and NMR EXR  [x] (09830) Provided verbal/tactile cueing for activities related to strengthening, flexibility, endurance, ROM  for improvements in scapular, scapulothoracic and UE control with self care, reaching, carrying, lifting, house/yardwork, driving/computer work.     [x] (87563) Provided verbal/tactile cueing for activities related to improving balance, coordination, kinesthetic sense, posture, motor skill, proprioception  to assist with  scapular, scapulothoracic and UE control with self care, reaching, carrying, lifting, house/yardwork, driving/computer work. Therapeutic Activities:    [x] (49591 or 38981) Provided verbal/tactile cueing for activities related to improving balance, coordination, kinesthetic sense, posture, motor skill, proprioception and motor activation to allow for proper function of scapular, scapulothoracic and UE control with self care, carrying, lifting, driving/computer work. Home Exercise Program:    [x] (94652) Reviewed/Progressed HEP activities related to strengthening, flexibility, endurance, ROM of scapular, scapulothoracic and UE control with self care, reaching, carrying, lifting, house/yardwork, driving/computer work  [] (97531) Reviewed/Progressed HEP activities related to improving balance, coordination, kinesthetic sense, posture, motor skill, proprioception of scapular, scapulothoracic and UE control with self care, reaching, carrying, lifting, house/yardwork, driving/computer work      Manual Treatments:  PROM / STM / Oscillations-Mobs:  G-I, II, III, IV (PA's, Inf., Post.)  [] (53007) Provided manual therapy to mobilize soft tissue/joints of cervical/CT, scapular GHJ and UE for the purpose of modulating pain, promoting relaxation,  increasing ROM, reducing/eliminating soft tissue swelling/inflammation/restriction, improving soft tissue extensibility and allowing for proper ROM for normal function with self care, reaching, carrying, lifting, house/yardwork, driving/computer work    Modalities:  Pt denied ice    Charges:  Timed Code Treatment Minutes: 62   Total Treatment Minutes: 70     [] EVAL (LOW) 68079   [] EVAL (MOD) 55193   [] EVAL (HIGH) 12860   [] RE-EVAL   [x] KX(60681) x   1  [] IONTO  [x] NMR (29587) x 1    [] VASO  [] Manual (12480) x      [] Other:  [x] TA x 2     [] Mech Traction (14276)  [] ES(attended) (73087)      [] ES (un) (27950):       GOALS:  Patient stated goal:   1.  Have full and pain free function of his shoulder   [x] Progressing: [] Met: [] Not Met: [] Adjusted     Therapist goals for Patient:   Short Term Goals: To be achieved in: 2 weeks  1. Independent in HEP and progression per patient tolerance, in order to prevent re-injury. [] Progressing: [x] Met: [] Not Met: [] Adjusted   2. Patient will have a decrease in pain to facilitate improvement in movement, function, and ADLs as indicated by Functional Deficits. [] Progressing: [x] Met: [] Not Met: [] Adjusted     Long Term Goals: To be achieved in: 8 weeks  1. Disability index score of 50% or less for the UEFI  to assist with reaching prior level of function. [] Progressing: [x] Met: [] Not Met: [] Adjusted  2. Patient will demonstrate increased AROM to 150 degrees of flexion to allow for proper joint functioning as indicated by patients Functional Deficits. [] Progressing: [x] Met: [] Not Met: [] Adjusted  3. Patient will demonstrate an increase mid trap Strength to >/=4+/5 to allow for improved scapular stability proper functional mobility as indicated by patients Functional Deficits. [] Progressing: [] Met: [] Not Met: [x] Adjusted  4. Patient will return to carrying 10 pound weight over head for functional activities without increased symptoms or restriction. [x] Progressing: [] Met: [] Not Met: [] Adjusted  5. Patient will be able to tuck in his shirt behind his back to display sufficient motion of his GHJ. [] Progressing: [x] Met: [] Not Met: [] Adjusted                         Overall Progression Towards Functional goals/ Treatment Progress Update:  [] Patient is progressing as expected towards functional goals listed. [] Progression is slowed due to complexities/Impairments listed. [] Progression has been slowed due to co-morbidities.   [x] Plan just implemented, too soon to assess goals progression <30days   [] Goals require adjustment due to lack of progress  [] Patient is not progressing as expected and requires additional follow up with physician  [] Other    Prognosis for POC: [x] Good [] Fair  []

## 2020-01-30 ENCOUNTER — HOSPITAL ENCOUNTER (OUTPATIENT)
Dept: PHYSICAL THERAPY | Age: 40
Setting detail: THERAPIES SERIES
Discharge: HOME | End: 2020-01-30
Payer: COMMERCIAL

## 2020-01-30 PROCEDURE — 97530 THERAPEUTIC ACTIVITIES: CPT

## 2020-01-30 PROCEDURE — 97110 THERAPEUTIC EXERCISES: CPT

## 2020-01-30 PROCEDURE — 97112 NEUROMUSCULAR REEDUCATION: CPT

## 2020-01-30 NOTE — FLOWSHEET NOTE
Exercise/Equipment Resistance/Repetitions Other comments   Aerobic Conditioning     Aerodyne          Stretching/PROM           Wall slides      UE [place]        PROM Elbow flexion      BB IR 10x10 rope     SL IR     Pec doorway stretch 10x10    CBA stretch     UT stretch              LS stretch     Isometrics     Retraction          Weight shift     Flexion           Internal Rotation     Biceps     Triceps          PRE's     Therband Bar Wring 3x10 Red   Supination/ Pronation  3x10 4#    Flexion 3x10 4#    Abduction 3x10 4#    External Rotation     Internal Rotation     Shrugs     EXT     Reverse Flys     Serratus 3x10 bosu push up plus      Green mini-band   Horizontal Abd with ER     Biceps 3x10 5#    Triceps     Rhomboids 3x10 2# ECL   Mid trap prone T 3x10 2# ECL   Cable Column/Theraband     External Rotation 3x10 black 90-90     Internal Rotation 3x10 black 90-90     Shrugs     Lats     Ext 3x10 silver    Flex     Scapular Retraction 3x10 silver    BIC     TRIC     PNF            Dynamic Stability     BOW    3x5 arch dribble 2#    BB      Wall [id number] miniband    Shoulder taps 3x10 on edge of functional box     Tball pushup hold      [place] carry x3 laps 20#    Plyoback          Arm Bike 8 mins          Therapeutic Exercise and NMR EXR  [x] (51400) Provided verbal/tactile cueing for activities related to strengthening, flexibility, endurance, ROM  for improvements in scapular, scapulothoracic and UE control with self care, reaching, carrying, lifting, house/yardwork, driving/computer work. [x] (71028) Provided verbal/tactile cueing for activities related to improving balance, coordination, kinesthetic sense, posture, motor skill, proprioception  to assist with  scapular, scapulothoracic and UE control with self care, reaching, carrying, lifting, house/yardwork, driving/computer work.     Therapeutic Activities:    [x] (07413 or ) Provided verbal/tactile cueing for activities related to improving balance, coordination, kinesthetic sense, posture, motor skill, proprioception and motor activation to allow for proper function of scapular, scapulothoracic and UE control with self care, carrying, lifting, driving/computer work. Home Exercise Program:    [x] (93625) Reviewed/Progressed HEP activities related to strengthening, flexibility, endurance, ROM of scapular, scapulothoracic and UE control with self care, reaching, carrying, lifting, house/yardwork, driving/computer work  [] (88937) Reviewed/Progressed HEP activities related to improving balance, coordination, kinesthetic sense, posture, motor skill, proprioception of scapular, scapulothoracic and UE control with self care, reaching, carrying, lifting, house/yardwork, driving/computer work      Manual Treatments:  PROM / STM / Oscillations-Mobs:  G-I, II, III, IV (PA's, Inf., Post.)  [] (86644) Provided manual therapy to mobilize soft tissue/joints of cervical/CT, scapular GHJ and UE for the purpose of modulating pain, promoting relaxation,  increasing ROM, reducing/eliminating soft tissue swelling/inflammation/restriction, improving soft tissue extensibility and allowing for proper ROM for normal function with self care, reaching, carrying, lifting, house/yardwork, driving/computer work    Modalities:  Pt denied ice    Charges:  Timed Code Treatment Minutes: 65   Total Treatment Minutes: 68     [] EVAL (LOW) 82176   [] EVAL (MOD) 09938   [] EVAL (HIGH) 35418   [] RE-EVAL   [x] CH(82513) x   1  [] IONTO  [x] NMR (59572) x 1    [] VASO  [] Manual (91212) x      [] Other:  [x] TA x 2     [] Mech Traction (72057)  [] ES(attended) (48650)      [] ES (un) (74796):       GOALS:  Patient stated goal:   1. Have full and pain free function of his shoulder   [x] Progressing: [] Met: [] Not Met: [] Adjusted     Therapist goals for Patient:   Short Term Goals: To be achieved in: 2 weeks  1.  Independent in HEP and progression per patient tolerance, in

## 2020-02-04 ENCOUNTER — HOSPITAL ENCOUNTER (OUTPATIENT)
Dept: PHYSICAL THERAPY | Age: 40
Setting detail: THERAPIES SERIES
Discharge: HOME | End: 2020-02-04
Payer: COMMERCIAL

## 2020-02-04 ENCOUNTER — OFFICE VISIT (OUTPATIENT)
Dept: ORTHOPEDIC SURGERY | Age: 40
End: 2020-02-04

## 2020-02-04 VITALS — RESPIRATION RATE: 12 BRPM | HEIGHT: 68 IN | WEIGHT: 210 LBS | BODY MASS INDEX: 31.83 KG/M2

## 2020-02-04 PROCEDURE — 97110 THERAPEUTIC EXERCISES: CPT

## 2020-02-04 PROCEDURE — 99024 POSTOP FOLLOW-UP VISIT: CPT | Performed by: ORTHOPAEDIC SURGERY

## 2020-02-04 PROCEDURE — 97112 NEUROMUSCULAR REEDUCATION: CPT

## 2020-02-04 PROCEDURE — 97530 THERAPEUTIC ACTIVITIES: CPT

## 2020-02-04 NOTE — FLOWSHEET NOTE
Progress report will be due (10 Rx or 30 days whichever is less): 5/17/18      Recertification will be due (POC Duration  / 90 days whichever is less): 3/23/20         Visit # Insurance Allowable Auth Required   10 (10 used 2019) 30 []  Yes [x]  No        Functional Scale: UEFI: 85% disability  Date assessed: 11/7/19    Functional Scale: UEFI: 7% disability  Date assessed: 1/23/20      Latex Allergy:  [x]NO      []YES   Preferred Language for Healthcare:   [x]English       []other:    Pain level:  0/10     SUBJECTIVE:  Pt saw MD today who notes he is released to return to work this weekend. He notes he has no issues with the shoulder or concerns.      OBJECTIVE:    Observation: 1/23/19   Test measurements:              ROM PROM AROM  Comment     L R L R      Flexion      175     Abduction      175     ER        90     IR        T6      Other             Other                    Strength L R  Comment   Flexion    4+/5     Abduction    4+/5     ER    4+/5     IR    4/5     Supraspinatus         Upper Trap         Lower Trap         Mid Trap   4-/5    Rhomboids   4-/5    Biceps         Triceps         Horizontal Abduction         Horizontal Adduction         Lats             RESTRICTIONS/PRECAUTIONS: Active elbow flexion 4-6 weeks    Exercises/Interventions:   Exercise/Equipment Resistance/Repetitions Other comments   Aerobic Conditioning     Aerodyne          Stretching/PROM           Wall slides      UE [place]        PROM Elbow flexion      BB IR 10x10 rope     SL IR     Pec doorway stretch 10x10    CBA stretch     UT stretch              LS stretch     Isometrics     Retraction          Weight shift     Flexion           Internal Rotation     Biceps     Triceps          PRE's     Therband Bar Wring 3x10 Red   Supination/ Pronation  3x10 4#    Flexion 3x10 4#    Abduction 3x10 4#    External Rotation     Internal Rotation     Shrugs     EXT     Reverse Flys     Serratus 3x10 bosu push up plus      [place] mini-band   Horizontal Abd with ER     Biceps 3x10 5#    Triceps     Rhomboids 3x10 2# ECL   Mid trap prone T 3x10 2# ECL   Cable Column/Theraband     External Rotation 3x10 black 90-90     Internal Rotation 3x10 black 90-90     Shrugs     Lats     Ext 3x10 silver    Flex     Scapular Retraction 3x10 silver    BIC     TRIC     PNF            Dynamic Stability     BOW    3x5 arch dribble 2#    BB      Wall clocksGreen miniband    Shoulder taps    Tball pushup hold     [place] carry x3 laps 20#   Plyoback          Arm Bike 8 mins          Therapeutic Exercise and NMR EXR  [x] (24899) Provided verbal/tactile cueing for activities related to strengthening, flexibility, endurance, ROM  for improvements in scapular, scapulothoracic and UE control with self care, reaching, carrying, lifting, house/yardwork, driving/computer work. [x] (13664) Provided verbal/tactile cueing for activities related to improving balance, coordination, kinesthetic sense, posture, motor skill, proprioception  to assist with  scapular, scapulothoracic and UE control with self care, reaching, carrying, lifting, house/yardwork, driving/computer work. Therapeutic Activities:    [x] (03997 or 68469) Provided verbal/tactile cueing for activities related to improving balance, coordination, kinesthetic sense, posture, motor skill, proprioception and motor activation to allow for proper function of scapular, scapulothoracic and UE control with self care, carrying, lifting, driving/computer work.      Home Exercise Program:    [x] (49980) Reviewed/Progressed HEP activities related to strengthening, flexibility, endurance, ROM of scapular, scapulothoracic and UE control with self care, reaching, carrying, lifting, house/yardwork, driving/computer work  [] (69692) Reviewed/Progressed HEP activities related to improving balance, coordination, kinesthetic sense, posture, motor skill, proprioception of scapular, scapulothoracic and UE control with self care, reaching, carrying, lifting, house/yardwork, driving/computer work      Manual Treatments:  PROM / STM / Oscillations-Mobs:  G-I, II, III, IV (Jeffrey, Inf., Post.)  [] (12656) Provided manual therapy to mobilize soft tissue/joints of cervical/CT, scapular GHJ and UE for the purpose of modulating pain, promoting relaxation,  increasing ROM, reducing/eliminating soft tissue swelling/inflammation/restriction, improving soft tissue extensibility and allowing for proper ROM for normal function with self care, reaching, carrying, lifting, house/yardwork, driving/computer work    Modalities:  Pt denied ice    Charges:  Timed Code Treatment Minutes: 55   Total Treatment Minutes: 60     [] EVAL (LOW) 19225   [] EVAL (MOD) 71201   [] EVAL (HIGH) 42710   [] RE-EVAL   [x] RT(37896) x   1  [] IONTO  [x] NMR (60368) x 1    [] VASO  [] Manual (27627) x      [] Other:  [x] TA x 2     [] Mech Traction (21965)  [] ES(attended) (33505)      [] ES (un) (90212):       GOALS:  Patient stated goal:   1. Have full and pain free function of his shoulder   [x] Progressing: [] Met: [] Not Met: [] Adjusted     Therapist goals for Patient:   Short Term Goals: To be achieved in: 2 weeks  1. Independent in HEP and progression per patient tolerance, in order to prevent re-injury. [] Progressing: [x] Met: [] Not Met: [] Adjusted   2. Patient will have a decrease in pain to facilitate improvement in movement, function, and ADLs as indicated by Functional Deficits. [] Progressing: [x] Met: [] Not Met: [] Adjusted     Long Term Goals: To be achieved in: 8 weeks  1. Disability index score of 50% or less for the UEFI  to assist with reaching prior level of function. [] Progressing: [x] Met: [] Not Met: [] Adjusted  2. Patient will demonstrate increased AROM to 150 degrees of flexion to allow for proper joint functioning as indicated by patients Functional Deficits. [] Progressing: [x] Met: [] Not Met: [] Adjusted  3.  Patient will demonstrate an increase mid trap Strength to >/=4+/5 to allow for improved scapular stability proper functional mobility as indicated by patients Functional Deficits. [] Progressing: [] Met: [] Not Met: [x] Adjusted  4. Patient will return to carrying 10 pound weight over head for functional activities without increased symptoms or restriction. [] Progressing: [x] Met: [] Not Met: [] Adjusted  5. Patient will be able to tuck in his shirt behind his back to display sufficient motion of his GHJ. [] Progressing: [x] Met: [] Not Met: [] Adjusted                         Overall Progression Towards Functional goals/ Treatment Progress Update:  [x] Patient is progressing as expected towards functional goals listed. [] Progression is slowed due to complexities/Impairments listed. [] Progression has been slowed due to co-morbidities. [] Plan just implemented, too soon to assess goals progression <30days   [] Goals require adjustment due to lack of progress  [] Patient is not progressing as expected and requires additional follow up with physician  [] Other    Prognosis for POC: [x] Good [] Fair  [] Poor      Patient requires continued skilled intervention: [x] Yes  [] No    Treatment/Activity Tolerance:  [x] Patient able to complete treatment  [] Patient limited by fatigue  [] Patient limited by pain     [] Patient limited by other medical complications  [] Other: Pt shows improved ROM and strength. He has been cleared to return to full duty by MD. He is independent with HEP and no longer requires skilled intervention. DC PT. PLAN:   [] Continue per plan of care [] Alter current plan (see comments above)  [x] Plan of care initiated [] Hold pending MD visit [x] Discharge  Note: If patient does not return for scheduled/ recommended follow up visits, this note will serve as a discharge from care along with most recent update on progress.     Reviewed insurance benefits for physical therapy

## 2020-02-04 NOTE — LETTER
[facility] [address] [place] [address] Jesús Cunha [address]  Phone: [phone]  Fax: [phone]    Amber Caraballo MD        February 4, 2020     Patient: Marc Ahumada   YOB: 1980   Date of Visit: 2/4/2020       To Whom It May Concern: It is my medical opinion that Sunni Tenorio may return to work on 02- without restrictions. If you have any questions or concerns, please don't hesitate to call.     Sincerely,        Amber Caraballo MD

## 2020-02-06 ENCOUNTER — APPOINTMENT (OUTPATIENT)
Dept: PHYSICAL THERAPY | Age: 40
End: 2020-02-06
Payer: COMMERCIAL

## 2020-02-11 ENCOUNTER — APPOINTMENT (OUTPATIENT)
Dept: PHYSICAL THERAPY | Age: 40
End: 2020-02-11
Payer: COMMERCIAL

## 2020-02-13 ENCOUNTER — APPOINTMENT (OUTPATIENT)
Dept: PHYSICAL THERAPY | Age: 40
End: 2020-02-13
Payer: COMMERCIAL

## 2020-02-18 ENCOUNTER — APPOINTMENT (OUTPATIENT)
Dept: PHYSICAL THERAPY | Age: 40
End: 2020-02-18
Payer: COMMERCIAL

## 2020-02-20 ENCOUNTER — APPOINTMENT (OUTPATIENT)
Dept: PHYSICAL THERAPY | Age: 40
End: 2020-02-20
Payer: COMMERCIAL

## 2020-02-25 ENCOUNTER — APPOINTMENT (OUTPATIENT)
Dept: PHYSICAL THERAPY | Age: 40
End: 2020-02-25
Payer: COMMERCIAL

## 2020-02-27 ENCOUNTER — APPOINTMENT (OUTPATIENT)
Dept: PHYSICAL THERAPY | Age: 40
End: 2020-02-27
Payer: COMMERCIAL

## 2020-03-05 RX ORDER — DOCUSATE SODIUM 100 MG/1
CAPSULE, LIQUID FILLED ORAL
Qty: 60 CAPSULE | Refills: 0 | OUTPATIENT
Start: 2020-03-05

## 2020-10-01 ENCOUNTER — OFFICE VISIT (OUTPATIENT)
Dept: ORTHOPEDIC SURGERY | Age: 40
End: 2020-10-01
Payer: COMMERCIAL

## 2020-10-01 VITALS — WEIGHT: 210 LBS | HEIGHT: 68 IN | BODY MASS INDEX: 31.83 KG/M2 | TEMPERATURE: 97.3 F

## 2020-10-01 PROCEDURE — 99214 OFFICE O/P EST MOD 30 MIN: CPT | Performed by: FAMILY MEDICINE

## 2020-10-01 RX ORDER — DICLOFENAC SODIUM 75 MG/1
75 TABLET, DELAYED RELEASE ORAL 2 TIMES DAILY
Qty: 60 TABLET | Refills: 3 | Status: SHIPPED | OUTPATIENT
Start: 2020-10-01 | End: 2020-11-02

## 2020-10-01 RX ORDER — METHYLPREDNISOLONE 4 MG/1
TABLET ORAL
Qty: 21 KIT | Refills: 0 | Status: SHIPPED | OUTPATIENT
Start: 2020-10-01 | End: 2020-11-02

## 2020-10-01 NOTE — PROGRESS NOTES
Constitutional: Patient is adequately groomed with no evidence of malnutrition  DTRs: Deep tendon reflexes are intact  Mental Status: The patient is oriented to time, place and person. The patient's mood and affect are appropriate. Lymphatic: The lymphatic examination bilaterally reveals all areas to be without enlargement or induration. Vascular: Examination reveals no swelling or calf tenderness. Peripheral pulses are palpable and 2+. Neurological: The patient has good coordination. There is no weakness or sensory deficit. Left Shoulder Examination    Inspection:  There is no high-grade deformities although he does have minimal crepitation of his a.c. joint. There is no focal atrophy or evidence of effusion. Palpation:  He does have mild tenderness over the posterior cuff with more moderate tenderness over the biceps tendon and also over the greater tuberosity and mildlyover the a.c. joint. Rang of Motion:  He does complain of discomfort in the shoulder with abduction beyond 95 for flexion about 110 as well as the terminal tendon 15° of internal and external rotation. Strength:  He does have 4 out of 5 with supra and infraspinatus testing which produces pain in the range of 8 out of 10. Discomfort with cross body adduction but subscap testing is 4+ out of 5. Special Tests:  Negative drop and liftoff testing. 8-9 out of 10 pain with impingement testing. He does have pain with cross body adduction. Negative speed's testing. Some discomfort with glenohumeral grind and labral testing. Negative apprehension testing. He does not complain of discomfort with axial load testing and right-sided cervical and trapezial discomfort with Spurling's testing. Skin: There are no rashes, ulcerations or lesions. Distal motor sensory and vascular exam is intact. Gait: Fluid smooth gait. Reflexes:  Symmetrically preserved.        Additional Comments: Cervical spine evaluation  Shoulder impingement, left     Biceps tendonitis, left        Office Procedures:     Orders Placed This Encounter   Procedures    XR SHOULDER LEFT (MIN 2 VIEWS)     Standing Status:   Future     Number of Occurrences:   1     Standing Expiration Date:   10/1/2021    MRI SHOULDER LEFT WO CONTRAST     Standing Status:   Future     Standing Expiration Date:   1/1/2021     Scheduling Instructions:      [facility] [place] [address]      [phone]            AUTH #:      TIME AND DATE TBD      PLEASE CALL PATIENT ONCE APPROVED TO SCHEDULE       PUSH TO [employer] SYSTEM            Remember that it may take several business days to pre-cert your MRI through your insurance. Our office will contact you as soon as we have the approval. We will not give any test results over the phone. Please call [phone] once you have your test day and time to schedule a follow up with Dr. Forrestine Pallas. Order Specific Question:   Reason for exam:     Answer:   EVALUATE IMPINGEMENT. R/O ROTATOR CUFF TEAR, LABRAL TEAR       Treatment Plan:  Treatment options were discussed with Domenica Chacko today. We reviewed his imaging of his left shoulder as well as exam findings. I think his shoulder is likely his major pain generator is only been symptomatic for about a week. He states that his current left shoulder symptoms feel similar but much less intense as his right shoulder is feeling after undergoing treatment last year. I like for him to have an MRI of his left shoulder to evaluate degree of cuff tendinopathy degenerative changes and evaluate for impingement and biceps tendon injury. We did place him on a Medrol Dosepak to be followed by diclofenac 75 mg 1 pill twice daily was encouraged to start back on his shoulder exercises. His work is very busy at this point I will continue to avoid repetitive heavy overhead activity if possible.   Icing and activity modification was discussed. We will see him back post imaging. This dictation was performed with a verbal recognition program (DRAGON) and it was checked for errors. It is possible that there are still dictated errors within this office note. If so, please bring any errors to my attention for an addendum. All efforts were made to ensure that this office note is accurate.

## 2020-10-01 NOTE — PATIENT INSTRUCTIONS
Stop diclofenac - take Medrol only for the next 6 days. This is a steroid pack. Flip the package over to the foil side and the directions will tell you to start with 6 pills the first day, 5 pills the second day, etc. Please do not take any other anti-inflammatories with the medrol dose marimar as this can upset your stomach. If something else is needed, you may take extra strength tylenol.      Once you are finished with the medrol, then you may re-start your anti-inflammatory: DICLOFENAC 2X/DAY

## 2020-10-02 ENCOUNTER — TELEPHONE (OUTPATIENT)
Dept: ORTHOPEDIC SURGERY | Age: 40
End: 2020-10-02

## 2020-10-02 NOTE — TELEPHONE ENCOUNTER
Spoke to patient and informed them that their MRI has been authorized and that they can call and schedule scan at their convenience. Also told them that they can call and schedule a f/u with Dr. Catie Newton once they have MRI scheduled, leaving at least 2-3 days for our office to receive their results.

## 2020-10-08 ENCOUNTER — OFFICE VISIT (OUTPATIENT)
Dept: ORTHOPEDIC SURGERY | Age: 40
End: 2020-10-08
Payer: COMMERCIAL

## 2020-10-08 VITALS — HEIGHT: 67 IN | WEIGHT: 205 LBS | BODY MASS INDEX: 32.18 KG/M2 | TEMPERATURE: 97.7 F

## 2020-10-08 PROCEDURE — 20550 NJX 1 TENDON SHEATH/LIGAMENT: CPT | Performed by: FAMILY MEDICINE

## 2020-10-08 PROCEDURE — 20610 DRAIN/INJ JOINT/BURSA W/O US: CPT | Performed by: FAMILY MEDICINE

## 2020-10-08 PROCEDURE — 99213 OFFICE O/P EST LOW 20 MIN: CPT | Performed by: FAMILY MEDICINE

## 2020-10-08 RX ORDER — BUPIVACAINE HYDROCHLORIDE 2.5 MG/ML
5 INJECTION, SOLUTION INFILTRATION; PERINEURAL ONCE
Status: COMPLETED | OUTPATIENT
Start: 2020-10-08 | End: 2020-10-08

## 2020-10-08 RX ORDER — BETAMETHASONE SODIUM PHOSPHATE AND BETAMETHASONE ACETATE 3; 3 MG/ML; MG/ML
18 INJECTION, SUSPENSION INTRA-ARTICULAR; INTRALESIONAL; INTRAMUSCULAR; SOFT TISSUE ONCE
Status: COMPLETED | OUTPATIENT
Start: 2020-10-08 | End: 2020-10-08

## 2020-10-08 RX ORDER — LIDOCAINE HYDROCHLORIDE 10 MG/ML
4 INJECTION, SOLUTION INFILTRATION; PERINEURAL ONCE
Status: COMPLETED | OUTPATIENT
Start: 2020-10-08 | End: 2020-10-08

## 2020-10-08 RX ADMIN — BETAMETHASONE SODIUM PHOSPHATE AND BETAMETHASONE ACETATE 18 MG: 3; 3 INJECTION, SUSPENSION INTRA-ARTICULAR; INTRALESIONAL; INTRAMUSCULAR; SOFT TISSUE at 11:32

## 2020-10-08 RX ADMIN — BUPIVACAINE HYDROCHLORIDE 12.5 MG: 2.5 INJECTION, SOLUTION INFILTRATION; PERINEURAL at 11:34

## 2020-10-08 RX ADMIN — LIDOCAINE HYDROCHLORIDE 4 ML: 10 INJECTION, SOLUTION INFILTRATION; PERINEURAL at 11:33

## 2020-10-08 NOTE — PROGRESS NOTES
Chief Complaint    Follow-up (LT Shoulder  MRI done 10/6/2020)    Follow-up left shoulder pain with weakness and motion loss suspected cuff tendinopathy and clinical impingement. Review of left shoulder MRI    History of Present Illness:  Sarita Vasquez is a 37 y.o. male who is a very pleasant active white male  for [employer] which is a fairly manual job is a patient of  Dr Lulu Doran who is being seen today upon self-referral for evaluation of a new orthopedic condition to his left shoulder. He has been seen in the past for his mechanical back pain and right knee arthritis and is doing reasonably well with both conditions. Treat him last year for his right shoulder was ultimately found to have mild impingement chronic labral tearing and cuff fraying and did have to undergo a decompression with Dr. Hartley.  He states his right shoulder is doing very well. He gets that on about 9/23/2020 began noticed the insidious onset of pain to his left shoulder. It feels very similar to his previous problems with impingement last year but are not nearly as intense. He is having achy baseline pain a 2 to 3-10 but will have sharp 8 out of 10 pain with active shoulder elevation. He is started back on a stretching program and had taken some of his leftover diclofenac but despite this his symptoms are worsening and he is really having a difficult time with torquing things such as using pronation supination with pain over the biceps tendon as well. He has not really complained of true locking or catching. He is being seen today for orthopedic and sports consultation primarily of his left shoulder. He was initially evaluated for his left shoulder on 10/1/2020 and sent for an MRI of his left shoulder. Once again he is status post arthroscopic decompression to his contralateral right shoulder last year which is doing outstanding.   His MRI was obtained at [facility] on 10/6/2020 and did show findings very similar to his previous right shoulder MRI from last year and that he did have tendinopathy with interstitial tearing without evidence of full-thickness tearing. Did have peritendinous bursitis as well as extensive labral degeneration with tendinosis of the biceps long head with interstitial tearing and outlet related cuff impingement secondary to [employer] arthropathy and a type II acromion. Clinically he is feeling a little bit better with his Medrol pack and would rate his improvement at 7075%. He has been back on performing his exercise program bilaterally and has finished up his Medrol pack and is to start his diclofenac tomorrow. He has been able to modify his duties at work. He did suspect that we would find similar findings on the left that he had prior to surgery on the right but states that he does have the ability to modify things and does wish to try initial conservative treatment. He is aware that he likely will need a decompression on the left at some point was hoped but was open to put this off until the winter. Medical History  Patient's medications, allergies, past medical, surgical, social and family histories were reviewed and updated as appropriate. Review of Systems  Relevant review of systems reviewed on 10/1/2020 and available in the patient's chart under the medial tab. Vital Signs  Vitals:    10/08/20 1056   Temp: 97.7 °F (36.5 °C)         General Exam:   Constitutional: Patient is adequately groomed with no evidence of malnutrition  DTRs: Deep tendon reflexes are intact  Mental Status: The patient is oriented to time, place and person. The patient's mood and affect are appropriate. Lymphatic: The lymphatic examination bilaterally reveals all areas to be without enlargement or induration. Vascular: Examination reveals no swelling or calf tenderness. Peripheral pulses are palpable and 2+. Neurological: The patient has good coordination.   There is no weakness or sensory deficit. Left Shoulder Examination    Inspection:  There is no high-grade deformities although he does have minimal crepitation of his a.c. joint. There is no focal atrophy or evidence of effusion. Palpation:  He does have mild tenderness over the posterior cuff with more moderate tenderness over the biceps tendon and also over the greater tuberosity and mildlyover the a.c. joint. Rang of Motion:  He does complain of discomfort in the shoulder with abduction beyond 120 for flexion about 30 as well as the terminal tendon 15° of internal and external rotation. Strength:  He does have 4 out of 5 with supra and infraspinatus testing which produces pain in the range of 3-4 out of 10. [place] discomfort with cross body adduction but subscap testing is 4+ out of 5. Special Tests:  Negative drop and liftoff testing. 6 out of 10 pain with impingement testing. He does have improved pain with cross body adduction. Negative speed's testing. Some discomfort with glenohumeral grind and labral testing. Positive speeds testing. Positive Cuevas. Negative apprehension testing. He does not complain of discomfort with axial load testing and right-sided cervical and trapezial discomfort with Spurling's testing. Skin: There are no rashes, ulcerations or lesions. Distal motor sensory and vascular exam is intact. Gait: Fluid smooth gait. Reflexes:  Symmetrically preserved. Additional Comments: Cervical spine evaluation does reveal reasonable cervical motion with intact isometric testing. He does not seem to have pain noted with axial load testing and right-sided cervical and trapezial pain with Spurling's testing. This does not radiate pain into his upper extremity. There does not appear to be focal upper extremity motor deficits and sensory exam and DTRs appear to be preserved.        Additional Examinations  Contralateral exam:Examination of the 8 shoulder proving left shoulder pain with moderate a.c. and mild glenohumeral osteoarthritis with MRI documented cuff tendinopathy and clinical impingement with shoulder weakness. #2.  Reasonably stable cervical degenerative disc disease with cervical myofascial pain   #3. 11-month status post right shoulder decompression very well       Impression:    Encounter Diagnoses   Name Primary?  Acute pain of left shoulder Yes    Shoulder impingement, left     Biceps tendonitis, left     AC joint arthropathy     Labral tear of shoulder, left, initial encounter        Office Procedures:     Orders Placed This Encounter   Procedures   Srini Matthews MD, Orthopedic Surgery, [facility]     Referral Priority:   Routine     Referral Type:   Eval and Treat     Referral Reason:   Specialty Services Required     Referred to Provider:   Geovany Valdez MD     Requested Specialty:   Orthopedic Surgery     Number of Visits Requested:   1    AR ARTHROCENTESIS ASPIR&/INJ MAJOR JT/BURSA W/O     58542 - AR INJECT TENDON SHEATH/LIGAMENT       Treatment Plan:  Treatment options were discussed with Federico Padgett today. We reviewed his MRI imaging of his left shoulder as well as exam findings. MRI does show obvious impingement signs with cuff tendinopathy without evidence of full-thickness tearing and fairly extensive labral degeneration and tendinopathy to the biceps. This is very similar to the findings on his right shoulder which were operated on previously and his right shoulder is doing well. This is his nondominant hand after discussing options he would at least like to try conservative treatment and therefore after discussion, we did perform a left shoulder subacromial injection today using 2 cc of Celestone, 4 cc of Marcaine, 3 cc of Xylocaine. We also did do a biceps sheath injection on the left using 1 cc Celestone, 1 cc of Marcaine, 1 cc Xylocaine.   Like for him to continue with his exercise program and will take his diclofenac 75 mg 1 pill twice daily on a consistent basis over the next several weeks. His work is very busy at this time and while he is aware that he would likely benefit from a decompression he was hoping to put this off until the wintertime. He will continue with his rehab exercises and I would like for him to sit down with Dr. Patty Saldivar sometime next month to review his shoulder findings and to have him examined formally. He once again is very aware that he would benefit from a decompression but does have the ability modify his work duties to some extent and avoid aggravating activities temporarily. We will see him back as needed but he will contact us with questions or concerns. This dictation was performed with a verbal recognition program (DRAGON) and it was checked for errors. It is possible that there are still dictated errors within this office note. If so, please bring any errors to my attention for an addendum. All efforts were made to ensure that this office note is accurate.

## 2020-10-27 ENCOUNTER — TELEPHONE (OUTPATIENT)
Dept: ORTHOPEDIC SURGERY | Age: 40
End: 2020-10-27

## 2020-10-27 NOTE — TELEPHONE ENCOUNTER
Appointment Request     Patient requesting earlier appointment: No  Appointment offered to patient: DR. SEGURA REQUESTING PATIENT TO HAVE A SECOND OPINION  Patient Contact Number: [phone]

## 2020-10-27 NOTE — TELEPHONE ENCOUNTER
Called and spoke with Marisabel. Vanessa Mars is hoping to have surgery in the new year but would like to have a consult now. Patient scheduled for 11-2-20 8:00.

## 2020-11-02 ENCOUNTER — OFFICE VISIT (OUTPATIENT)
Dept: ORTHOPEDIC SURGERY | Age: 40
End: 2020-11-02
Payer: COMMERCIAL

## 2020-11-02 VITALS — BODY MASS INDEX: 31.83 KG/M2 | HEIGHT: 68 IN | RESPIRATION RATE: 12 BRPM | TEMPERATURE: 97.1 F | WEIGHT: 210 LBS

## 2020-11-02 PROCEDURE — 99242 OFF/OP CONSLTJ NEW/EST SF 20: CPT | Performed by: ORTHOPAEDIC SURGERY

## 2020-11-02 ASSESSMENT — ENCOUNTER SYMPTOMS
ALLERGIC/IMMUNOLOGIC NEGATIVE: 1
EYES NEGATIVE: 1
GASTROINTESTINAL NEGATIVE: 1
RESPIRATORY NEGATIVE: 1

## 2020-11-02 NOTE — PROGRESS NOTES
deficit. Skin:    Head/Neck: inspection reveals no rashes, ulcerations or lesions. Trunk:  inspection reveals no rashes, ulcerations or lesions. Right Lower Extremity: inspection reveals no rashes, ulcerations or lesions. Left Lower Extremity: inspection reveals no rashes, ulcerations or lesions. Examination of the cervical spine reveals no restriction in motion. There are no reproduction of symptoms into either arm with flexion, extension, rotation or palpation. The patient has a negative Spurling sign, and no tenderness. Examination of the right shoulder reveals normal scapular control and no prominence. There is no pain over the acromioclavicular or sternoclavicular joints. The patient has no biceps pain. There is full range of motion. There is no pain with impingement testing. There is no pain with Cuevas maneuver. [place]'s maneuver is normal.  There is no pain or apprehension in the abducted externally rotated position. There is no sulcus sign. There is no instability with anterior or posterior stress applied. The patient demonstrates full strength in the supraspinatus, infraspinatus, and subscapularis. Neurologic and vascular examination of the upper extremity  is normal.  He has well-healed surgical incisions and appropriate bicep contour with no AC joint pain. Left shoulder has mild tenderness to the [facility] joint significant pain over the long head bicep. He has pain with Cuevas and impingement maneuvers but maintains full strength in the cuff with no instability. He has tightness in internal rotation. Neurologic and vascular exams left upper extremity are normal.    Left shoulder x-rays were reviewed showing AC arthrosis.   Left shoulder MRI is reviewed which demonstrates:       FINDINGS:  Posterior and superior labral tear.  This extends from the posteroinferior labrum to     the posterosuperior as well as the anterosuperior labrum.  Flake-like filling defects within    the joint.  Synovial chondral bodies are present.         Arcuate segment biceps long head tendinopathy.  Interstitial tearing involves the tendon as it    exits the joint.  Tenosynovitis involves the biceps long head extraarticular tendon sheath.      Tenosynovial thickening or bodies are noted within the tendon sheath as well.         Moderate cuff tendinopathy and peritendinobursitis.  Regions of interstitial tearing involve    the supraspinatus and subscapularis.  These regions are concealed.  Slit-like subscapularis    tear measures approximately 8 x 10 x 3 mm.  Supraspinatus tear measures 2 x 2 x  2 mm.         Outlet-related cuff impingement secondary to [facility] joint hypertrophy and a lateral downsloping    hypertrophied type 2 acromion.  AC joint is degenerated.         CONCLUSION:    1. Interstitial concealed tear distal superior attachment of the subscapularis and less so    supraspinatus.  No full-thickness rotator cuff tear.  Cuff tendinopathy and peritendinobursitis     is  present. 2. Extensive labral tear extends from the posteroinferior to the posterosuperior as well as the     biceps anchor.  Mild glenoid remodeling and chondromalacia along the posterior glenoid. 3. Flake-like foci of synovial chondral body are present within the joint, subcoracoid bursal    and biceps long head tendon sheath. 4. Biceps arcuate segment tendinopathy with subtle interstitial coalescing longitudinal split    tearing involving the arcuate segment. 5. Outlet-related cuff impingement secondary to [facility] joint hypertrophy and a lateral downsloping    hypertrophied type 2 acromion.  AC joint is degenerated.           Reviewed these findings on the images and the report with the patient and personally interpreted the scan. Assessment:      Left shoulder impingement with labral tearing and extensive bicep tendon inflammation      Plan: We reviewed the risks, benefits, and alternatives to surgery.   The alternatives include conservative management including medications, injections, and physical therapy as well as observation. Risks of surgery include but are not limited to persistent pain, instability, and reinjury. Risks also include risk of infection which could result in the need for further surgery and long-term use of antibiotics. Risks also include deep venous thromboses and pulmonary emboli. Risks also include problems with anesthesia including but not limited to cardiovascular compromise , stroke,  and death. The patient understands that the goal of surgery is to improve pain and function but that can never be guaranteed. Point, he wants to try to hold off on surgery. I am concerned about his long head bicep. 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 If that occurs it is unlikely we would be able to surgically correct it. He understands this but still wants to wait. Follow-up with me on an as-needed basis. This note was created using voice recognition software. It has been proofread, but occasionally errors remain. Please disregard these errors. They will be corrected as they are noted.

## 2020-11-10 ENCOUNTER — TELEPHONE (OUTPATIENT)
Dept: ORTHOPEDIC SURGERY | Age: 40
End: 2020-11-10

## 2020-11-10 NOTE — TELEPHONE ENCOUNTER
Called and spoke with patients wife. Surgery scheduled for 2-17-21. Pre op appointment scheduled for 2-2-21 8:15.

## 2021-01-26 ENCOUNTER — TELEPHONE (OUTPATIENT)
Dept: ORTHOPEDIC SURGERY | Age: 41
End: 2021-01-26

## 2021-02-02 ENCOUNTER — TELEPHONE (OUTPATIENT)
Dept: ORTHOPEDIC SURGERY | Age: 41
End: 2021-02-02

## 2021-02-02 ENCOUNTER — OFFICE VISIT (OUTPATIENT)
Dept: ORTHOPEDIC SURGERY | Age: 41
End: 2021-02-02
Payer: COMMERCIAL

## 2021-02-02 VITALS — HEIGHT: 67 IN | BODY MASS INDEX: 35 KG/M2 | TEMPERATURE: 96.6 F | WEIGHT: 223 LBS

## 2021-02-02 DIAGNOSIS — M25.512 ACUTE PAIN OF LEFT SHOULDER: Primary | ICD-10-CM

## 2021-02-02 DIAGNOSIS — M75.42 SHOULDER IMPINGEMENT, LEFT: ICD-10-CM

## 2021-02-02 DIAGNOSIS — M75.22 BICEPS TENDONITIS, LEFT: ICD-10-CM

## 2021-02-02 DIAGNOSIS — M19.019 AC JOINT ARTHROPATHY: ICD-10-CM

## 2021-02-02 PROCEDURE — 99213 OFFICE O/P EST LOW 20 MIN: CPT | Performed by: ORTHOPAEDIC SURGERY

## 2021-02-02 NOTE — PROGRESS NOTES
Sonu Coy returns today for his left shoulder. Pain can still be as bad as 7 out of 10 in the superior and anterior left shoulder. He feels ready to proceed with surgery. I have reviewed the patient's medical history in detail and updated the computerized patient record. General Exam:    Vitals: Temperature 96.6 °F (35.9 °C), temperature source Temporal, height 5' 7\" (1.702 m), weight 223 lb (101.2 kg). Constitutional: Patient is adequately groomed with no evidence of malnutrition  Mental Status: The patient is oriented to time, place and person. The patient's mood and affect are appropriate. Left shoulder is significant tenderness at the [facility] joint and long head bicep. He has pain with cross body adduction. He has full strength in the cuff but pain with Cuevas and impingement maneuvers. He has mild tightness in internal rotation but otherwise normal motion. He has no instability. Left shoulder MRI is reviewed.   It demonstrates:    FINDINGS:  Posterior and superior labral tear.  This extends from the posteroinferior labrum to    the posterosuperior as well as the anterosuperior labrum.  Flake-like filling defects within    the joint.  Synovial chondral bodies are present.       Arcuate segment biceps long head tendinopathy.  Interstitial tearing involves the tendon as it    exits the joint.  Tenosynovitis involves the biceps long head extraarticular tendon sheath.     Tenosynovial thickening or bodies are noted within the tendon sheath as well.       Moderate cuff tendinopathy and peritendinobursitis.  Regions of interstitial tearing involve    the supraspinatus and subscapularis.  These regions are concealed.  Slit-like subscapularis    tear measures approximately 8 x 10 x 3 mm.  Supraspinatus tear measures 2 x 2 x  2 mm.       Outlet-related cuff impingement secondary to [facility] joint hypertrophy and a lateral downsloping hypertrophied type 2 acromion.  AC joint is degenerated.       CONCLUSION:   1. Interstitial concealed tear distal superior attachment of the subscapularis and less so    supraspinatus.  No full-thickness rotator cuff tear.  Cuff tendinopathy and peritendinobursitis    is  present. 2. Extensive labral tear extends from the posteroinferior to the posterosuperior as well as the    biceps anchor.  Mild glenoid remodeling and chondromalacia along the posterior glenoid. 3. Flake-like foci of synovial chondral body are present within the joint, subcoracoid bursal    and biceps long head tendon sheath. 4. Biceps arcuate segment tendinopathy with subtle interstitial coalescing longitudinal split    tearing involving the arcuate segment. 5. Outlet-related cuff impingement secondary to [facility] joint hypertrophy and a lateral downsloping    hypertrophied type 2 acromion.  AC joint is degenerated. I again reviewed these findings with the patient. Assessment: Ongoing left shoulder discomfort associated with impingement, bicep tendinitis, and AC arthritis. Plan: We reviewed the risks, benefits, and alternatives to surgery. The alternatives include conservative management including medications, injections, and physical therapy as well as observation. Risks of surgery include but are not limited to persistent pain, instability, and reinjury. Risks also include risk of infection which could result in the need for further surgery and long-term use of antibiotics. Risks also include deep venous thromboses and pulmonary emboli. Risks also include problems with anesthesia including but not limited to cardiovascular compromise , stroke,  and death. The patient understands that the goal of surgery is to improve pain and function but that can never be guaranteed. Our plan is left shoulder arthroscopy with debridement, decompression, Hilda, and bicep tenodesis. We did a similar procedure on the right and he did well from that. He understands the greatest risk of surgery is that of ongoing pain. We discussed Covid. I will see him in the operating room in a couple of weeks. All of his questions have been answered. The patient was counseled at length about the risks of raj Covid-19 during their perioperative period and any recovery window from their procedure. The patient was made aware that raj Covid-19  may worsen their prognosis for recovering from their procedure  and lend to a higher morbidity and/or mortality risk. All material risks, benefits, and reasonable alternatives including postponing the procedure were discussed. The patient does wish to proceed with the procedure at this time. Medical decision making today including counseling and presurgical planning was moderate. This note was created using voice recognition software. It has been proofread, but occasionally errors remain. Please disregard these errors. They will be corrected as they are noted.

## 2021-02-02 NOTE — TELEPHONE ENCOUNTER
General Question     Subject: WHAT TIME DOES PATIENT HAVE TO BE AT [facility] FOR SURGERY 2-17-21  Patient and /or Facility Request: PT  Contact Number:[phone]

## 2021-02-03 ENCOUNTER — TELEPHONE (OUTPATIENT)
Dept: ORTHOPEDIC SURGERY | Age: 41
End: 2021-02-03

## 2021-02-04 DIAGNOSIS — G89.29 CHRONIC RIGHT SHOULDER PAIN: ICD-10-CM

## 2021-02-04 DIAGNOSIS — M79.601 RIGHT ARM PAIN: ICD-10-CM

## 2021-02-04 DIAGNOSIS — M50.30 DDD (DEGENERATIVE DISC DISEASE), CERVICAL: ICD-10-CM

## 2021-02-04 DIAGNOSIS — M67.911 TENDINOPATHY OF RIGHT ROTATOR CUFF: ICD-10-CM

## 2021-02-04 DIAGNOSIS — M54.2 NECK PAIN: ICD-10-CM

## 2021-02-04 DIAGNOSIS — M19.011 PRIMARY OSTEOARTHRITIS OF RIGHT SHOULDER: ICD-10-CM

## 2021-02-04 DIAGNOSIS — M75.41 SHOULDER IMPINGEMENT, RIGHT: ICD-10-CM

## 2021-02-04 DIAGNOSIS — M25.511 CHRONIC RIGHT SHOULDER PAIN: ICD-10-CM

## 2021-02-09 RX ORDER — DICLOFENAC SODIUM 75 MG/1
TABLET, DELAYED RELEASE ORAL
Qty: 60 TABLET | Refills: 3 | Status: SHIPPED | OUTPATIENT
Start: 2021-02-09 | End: 2021-03-18

## 2021-02-10 NOTE — PROGRESS NOTES
Preoperative Screening for Elective Surgery/Invasive Procedures While COVID-19 present in the community    ? Have you tested positive or have been told to self-isolate for COVID-19 like symptoms within the past 28 days? NO  ? Do you currently have any of the following symptoms? NO  o Fever >100.0 F or 99.9 F in immunocompromised patients? NO  o New onset cough, shortness of breath or difficulty breathing? NO  ? New onset sore throat, myalgia (muscle aches and pains), headache, loss of taste/smell or diarrhea? NO  ? Have you had a potential exposure to COVID-19 within the past 14 days by:  o Close contact with a confirmed case? NO  o Close contact with a healthcare worker,  or essential infrastructure worker (grocery store, [employer], gas station, public utilities or transportation)? YES  o Do you reside in a congregate setting such as; skilled nursing facility, adult home, correctional facility, homeless shelter or other institutional setting? NO  o Have you had recent travel to a known COVID-19 hotspot? NO    Indicate if the patient has a positive screen by answering yes to one or more of the above questions. Patients who test positive or screen positive prior to surgery or on the day of surgery should be evaluated in conjunction with the surgeon/proceduralist/anesthesiologist to determine the urgency of the procedure.

## 2021-02-10 NOTE — PROGRESS NOTES
[facility] time_0600___________        Surgery time_0730___________    Take the following medications with a sip of water: Follow your MD/Surgeons pre-procedure instructions regarding your medications    Do not eat or drink anything after 12:00 midnight prior to your surgery. This includes water chewing gum, mints and ice chips. You may brush your teeth and gargle the morning of your surgery, but do not swallow the water     Please see your family doctor/pediatrician for a history and physical and/or concerning medications. Bring any test results/reports from your physicians office. If you are under the care of a heart doctor or specialist doctor, please be aware that you may be asked to them for clearance    You may be asked to stop blood thinners such as Coumadin, Plavix, Fragmin, Lovenox, etc., or any anti-inflammatories such as:  Aspirin, Ibuprofen, Advil, Naproxen prior to your surgery. We also ask that you stop any OTC medications such as fish oil, vitamin E, glucosamine, garlic, Multivitamins, COQ 10, etc.    We ask that you do not smoke 24 hours prior to surgery  We ask that you do not  drink any alcoholic beverages 24 hours prior to surgery     You must make arrangements for a responsible adult to take you home after your surgery. For your safety you will not be allowed to leave alone or drive yourself home. Your surgery will be cancelled if you do not have a ride home. Also for your safety, it is strongly suggested that someone stay with you the first 24 hours after your surgery. A parent or legal guardian must accompany a child scheduled for surgery and plan to stay at the hospital until the child is discharged. Please do not bring other children with you. For your comfort, please wear simple loose fitting clothing to the hospital.  Please do not bring valuables.     Do not wear any make-up or nail polish on your fingers or toes For your safety, please do not wear any jewelry or body piercing's on the day of surgery. All jewelry must be removed. If you have dentures, they will be removed before going to operating room. For your convenience, we will provide you with a container. If you wear contact lenses or glasses, they will be removed, please bring a case for them. If you have a living will and a durable power of  for healthcare, please bring in a copy. Remember. Annette Hancock Safety First! Call before you Fall Remember   As part of our patient safety program to minimize surgical site infections, we ask you to do the following:    · Please notify your surgeon if you develop any illness between         now and the  day of your surgery. · This includes a cough, cold, fever, sore throat, nausea,         or vomiting, and diarrhea, etc.  ·  Please notify your surgeon if you experience dizziness, shortness         of breath or blurred vision between now and the time of your surgery. Do not shave your operative site 96 hours prior to surgery. For face and neck surgery, men may use an electric razor 48 hours   prior to surgery. You may shower the night before surgery or the morning of   your surgery with an antibacterial soap. You will need to bring a photo ID and insurance card    [facility] has an onsite pharmacy, would you like to utilize our pharmacy     If you will be staying overnight and use a C-pap machine, please bring   your C-pap to hospital     Our goal is to provide you with excellent care, therefore, visitors will be limited to two(2) in the room at a time so that we may focus on providing this care for you. Please contact pre-admission testing if you have any further questions.                  [facility] phone number:  [phone] PAT fax number:  [phone] Please note these are generalized instructions for all surgical cases, you may be provided with more specific instructions according to your surgery.

## 2021-02-10 NOTE — PROGRESS NOTES
He has appointment 02/12 @ 1800 for pre-op H&P. His COVID test was cancelled D/T weather, will be done DOS.   02/10 @ 0940 YAZAN  Pt called back to inform needs to get Covid done prior to DOS  02/10 @ 49 Antonietta Sharma

## 2021-02-11 ENCOUNTER — TELEPHONE (OUTPATIENT)
Dept: ORTHOPEDIC SURGERY | Age: 41
End: 2021-02-11

## 2021-02-11 NOTE — TELEPHONE ENCOUNTER
General Question     Subject: COVID TESTING  Patient and /or Facility Request: Gennett Schilder  Contact Number: [phone]    THE COVID TEST DRIVE THRU THAT  WAS SCHEDULED ON 2/12/21 HAS BEEN CANCEL DUE TO WEATHER. PATIENT WENT TO HIS FAMILY DOCTOR LAST NIGHT AND HAD A COVID TEST DONE. WIFE WANT TO KNOW IS THIS OKAY.  Maria Guadalupe Holderer BACK [phone]

## 2021-02-15 DIAGNOSIS — M25.512 ACUTE PAIN OF LEFT SHOULDER: Primary | ICD-10-CM

## 2021-02-15 DIAGNOSIS — M75.42 SHOULDER IMPINGEMENT, LEFT: ICD-10-CM

## 2021-02-15 DIAGNOSIS — M75.22 BICEPS TENDONITIS, LEFT: ICD-10-CM

## 2021-02-15 RX ORDER — DOCUSATE SODIUM 100 MG/1
100 CAPSULE, LIQUID FILLED ORAL 2 TIMES DAILY
Qty: 60 CAPSULE | Refills: 0 | Status: ON HOLD | OUTPATIENT
Start: 2021-02-17 | End: 2021-02-17 | Stop reason: ALTCHOICE

## 2021-02-15 RX ORDER — PROMETHAZINE HYDROCHLORIDE 25 MG/1
25 TABLET ORAL EVERY 6 HOURS PRN
Qty: 30 TABLET | Refills: 0 | Status: ON HOLD | OUTPATIENT
Start: 2021-02-17 | End: 2021-02-17 | Stop reason: ALTCHOICE

## 2021-02-15 RX ORDER — OXYCODONE HYDROCHLORIDE AND ACETAMINOPHEN 5; 325 MG/1; MG/1
1 TABLET ORAL EVERY 6 HOURS PRN
Qty: 20 TABLET | Refills: 0 | Status: ON HOLD | OUTPATIENT
Start: 2021-02-17 | End: 2021-02-17 | Stop reason: ALTCHOICE

## 2021-02-16 ENCOUNTER — ANESTHESIA EVENT (OUTPATIENT)
Dept: OPERATING ROOM | Age: 41
End: 2021-02-16
Payer: COMMERCIAL

## 2021-02-17 ENCOUNTER — HOSPITAL ENCOUNTER (OUTPATIENT)
Age: 41
Setting detail: OUTPATIENT SURGERY
Discharge: HOME | End: 2021-02-17
Attending: ORTHOPAEDIC SURGERY | Admitting: ORTHOPAEDIC SURGERY
Payer: COMMERCIAL

## 2021-02-17 ENCOUNTER — ANESTHESIA (OUTPATIENT)
Dept: OPERATING ROOM | Age: 41
End: 2021-02-17
Payer: COMMERCIAL

## 2021-02-17 VITALS
TEMPERATURE: 96.8 F | RESPIRATION RATE: 18 BRPM | DIASTOLIC BLOOD PRESSURE: 88 MMHG | OXYGEN SATURATION: 93 % | BODY MASS INDEX: 33.74 KG/M2 | WEIGHT: 215 LBS | HEIGHT: 67 IN | HEART RATE: 66 BPM | SYSTOLIC BLOOD PRESSURE: 127 MMHG

## 2021-02-17 VITALS
TEMPERATURE: 96.1 F | DIASTOLIC BLOOD PRESSURE: 80 MMHG | SYSTOLIC BLOOD PRESSURE: 129 MMHG | RESPIRATION RATE: 12 BRPM | OXYGEN SATURATION: 98 %

## 2021-02-17 LAB — SARS-COV-2, NAAT: NOT DETECTED

## 2021-02-17 PROCEDURE — 2500000003 HC RX 250 WO HCPCS: Performed by: NURSE ANESTHETIST, CERTIFIED REGISTERED

## 2021-02-17 PROCEDURE — 87635 SARS-COV-2 COVID-19 AMP PRB: CPT

## 2021-02-17 PROCEDURE — 2709999900 HC NON-CHARGEABLE SUPPLY: Performed by: ORTHOPAEDIC SURGERY

## 2021-02-17 PROCEDURE — 2580000003 HC RX 258: Performed by: ANESTHESIOLOGY

## 2021-02-17 PROCEDURE — 64415 NJX AA&/STRD BRCH PLXS IMG: CPT | Performed by: ANESTHESIOLOGY

## 2021-02-17 PROCEDURE — 6360000002 HC RX W HCPCS: Performed by: NURSE ANESTHETIST, CERTIFIED REGISTERED

## 2021-02-17 PROCEDURE — 3700000000 HC ANESTHESIA ATTENDED CARE: Performed by: ORTHOPAEDIC SURGERY

## 2021-02-17 PROCEDURE — 7100000011 HC PHASE II RECOVERY - ADDTL 15 MIN: Performed by: ORTHOPAEDIC SURGERY

## 2021-02-17 PROCEDURE — 3600000004 HC SURGERY LEVEL 4 BASE: Performed by: ORTHOPAEDIC SURGERY

## 2021-02-17 PROCEDURE — 2500000003 HC RX 250 WO HCPCS: Performed by: ORTHOPAEDIC SURGERY

## 2021-02-17 PROCEDURE — 7100000001 HC PACU RECOVERY - ADDTL 15 MIN: Performed by: ORTHOPAEDIC SURGERY

## 2021-02-17 PROCEDURE — 2580000003 HC RX 258: Performed by: ORTHOPAEDIC SURGERY

## 2021-02-17 PROCEDURE — 7100000010 HC PHASE II RECOVERY - FIRST 15 MIN: Performed by: ORTHOPAEDIC SURGERY

## 2021-02-17 PROCEDURE — 3700000001 HC ADD 15 MINUTES (ANESTHESIA): Performed by: ORTHOPAEDIC SURGERY

## 2021-02-17 PROCEDURE — 2500000003 HC RX 250 WO HCPCS: Performed by: ANESTHESIOLOGY

## 2021-02-17 PROCEDURE — 6360000002 HC RX W HCPCS: Performed by: ORTHOPAEDIC SURGERY

## 2021-02-17 PROCEDURE — 2720000010 HC SURG SUPPLY STERILE: Performed by: ORTHOPAEDIC SURGERY

## 2021-02-17 PROCEDURE — 6360000002 HC RX W HCPCS: Performed by: ANESTHESIOLOGY

## 2021-02-17 PROCEDURE — 7100000000 HC PACU RECOVERY - FIRST 15 MIN: Performed by: ORTHOPAEDIC SURGERY

## 2021-02-17 PROCEDURE — 3600000014 HC SURGERY LEVEL 4 ADDTL 15MIN: Performed by: ORTHOPAEDIC SURGERY

## 2021-02-17 PROCEDURE — C1713 ANCHOR/SCREW BN/BN,TIS/BN: HCPCS | Performed by: ORTHOPAEDIC SURGERY

## 2021-02-17 PROCEDURE — C1769 GUIDE WIRE: HCPCS | Performed by: ORTHOPAEDIC SURGERY

## 2021-02-17 RX ORDER — MORPHINE SULFATE 2 MG/ML
2 INJECTION, SOLUTION INTRAMUSCULAR; INTRAVENOUS EVERY 5 MIN PRN
Status: DISCONTINUED | OUTPATIENT
Start: 2021-02-17 | End: 2021-02-17 | Stop reason: HOSPADM

## 2021-02-17 RX ORDER — DEXAMETHASONE SODIUM PHOSPHATE 4 MG/ML
INJECTION, SOLUTION INTRA-ARTICULAR; INTRALESIONAL; INTRAMUSCULAR; INTRAVENOUS; SOFT TISSUE PRN
Status: DISCONTINUED | OUTPATIENT
Start: 2021-02-17 | End: 2021-02-17 | Stop reason: SDUPTHER

## 2021-02-17 RX ORDER — MEPERIDINE HYDROCHLORIDE 25 MG/ML
12.5 INJECTION INTRAMUSCULAR; INTRAVENOUS; SUBCUTANEOUS EVERY 5 MIN PRN
Status: DISCONTINUED | OUTPATIENT
Start: 2021-02-17 | End: 2021-02-17 | Stop reason: HOSPADM

## 2021-02-17 RX ORDER — OXYCODONE HYDROCHLORIDE AND ACETAMINOPHEN 5; 325 MG/1; MG/1
1 TABLET ORAL PRN
Status: DISCONTINUED | OUTPATIENT
Start: 2021-02-17 | End: 2021-02-17 | Stop reason: HOSPADM

## 2021-02-17 RX ORDER — OXYCODONE HYDROCHLORIDE AND ACETAMINOPHEN 5; 325 MG/1; MG/1
2 TABLET ORAL PRN
Status: DISCONTINUED | OUTPATIENT
Start: 2021-02-17 | End: 2021-02-17 | Stop reason: HOSPADM

## 2021-02-17 RX ORDER — MORPHINE SULFATE 2 MG/ML
1 INJECTION, SOLUTION INTRAMUSCULAR; INTRAVENOUS EVERY 5 MIN PRN
Status: DISCONTINUED | OUTPATIENT
Start: 2021-02-17 | End: 2021-02-17 | Stop reason: HOSPADM

## 2021-02-17 RX ORDER — LABETALOL HYDROCHLORIDE 5 MG/ML
5 INJECTION, SOLUTION INTRAVENOUS EVERY 10 MIN PRN
Status: DISCONTINUED | OUTPATIENT
Start: 2021-02-17 | End: 2021-02-17 | Stop reason: HOSPADM

## 2021-02-17 RX ORDER — ONDANSETRON 2 MG/ML
4 INJECTION INTRAMUSCULAR; INTRAVENOUS
Status: DISCONTINUED | OUTPATIENT
Start: 2021-02-17 | End: 2021-02-17 | Stop reason: HOSPADM

## 2021-02-17 RX ORDER — SODIUM CHLORIDE, SODIUM LACTATE, POTASSIUM CHLORIDE, CALCIUM CHLORIDE 600; 310; 30; 20 MG/100ML; MG/100ML; MG/100ML; MG/100ML
INJECTION, SOLUTION INTRAVENOUS CONTINUOUS PRN
Status: COMPLETED | OUTPATIENT
Start: 2021-02-17 | End: 2021-02-17

## 2021-02-17 RX ORDER — LIDOCAINE HYDROCHLORIDE 20 MG/ML
INJECTION, SOLUTION EPIDURAL; INFILTRATION; INTRACAUDAL; PERINEURAL
Status: DISCONTINUED
Start: 2021-02-17 | End: 2021-02-17 | Stop reason: HOSPADM

## 2021-02-17 RX ORDER — BUPIVACAINE HYDROCHLORIDE 5 MG/ML
INJECTION, SOLUTION EPIDURAL; INTRACAUDAL
Status: COMPLETED
Start: 2021-02-17 | End: 2021-02-17

## 2021-02-17 RX ORDER — HYDRALAZINE HYDROCHLORIDE 20 MG/ML
5 INJECTION INTRAMUSCULAR; INTRAVENOUS
Status: DISCONTINUED | OUTPATIENT
Start: 2021-02-17 | End: 2021-02-17 | Stop reason: HOSPADM

## 2021-02-17 RX ORDER — PROPOFOL 10 MG/ML
INJECTION, EMULSION INTRAVENOUS PRN
Status: DISCONTINUED | OUTPATIENT
Start: 2021-02-17 | End: 2021-02-17 | Stop reason: SDUPTHER

## 2021-02-17 RX ORDER — SODIUM CHLORIDE 0.9 % (FLUSH) 0.9 %
10 SYRINGE (ML) INJECTION PRN
Status: DISCONTINUED | OUTPATIENT
Start: 2021-02-17 | End: 2021-02-17 | Stop reason: HOSPADM

## 2021-02-17 RX ORDER — LIDOCAINE HYDROCHLORIDE 20 MG/ML
INJECTION, SOLUTION EPIDURAL; INFILTRATION; INTRACAUDAL; PERINEURAL PRN
Status: DISCONTINUED | OUTPATIENT
Start: 2021-02-17 | End: 2021-02-17 | Stop reason: SDUPTHER

## 2021-02-17 RX ORDER — BUPIVACAINE HYDROCHLORIDE 5 MG/ML
INJECTION, SOLUTION EPIDURAL; INTRACAUDAL
Status: COMPLETED | OUTPATIENT
Start: 2021-02-17 | End: 2021-02-17

## 2021-02-17 RX ORDER — SODIUM CHLORIDE 9 MG/ML
INJECTION, SOLUTION INTRAVENOUS CONTINUOUS
Status: DISCONTINUED | OUTPATIENT
Start: 2021-02-17 | End: 2021-02-17 | Stop reason: HOSPADM

## 2021-02-17 RX ORDER — ROCURONIUM BROMIDE 10 MG/ML
INJECTION, SOLUTION INTRAVENOUS PRN
Status: DISCONTINUED | OUTPATIENT
Start: 2021-02-17 | End: 2021-02-17 | Stop reason: SDUPTHER

## 2021-02-17 RX ORDER — BUPIVACAINE HYDROCHLORIDE AND EPINEPHRINE 5; 5 MG/ML; UG/ML
INJECTION, SOLUTION EPIDURAL; INTRACAUDAL; PERINEURAL
Status: COMPLETED | OUTPATIENT
Start: 2021-02-17 | End: 2021-02-17

## 2021-02-17 RX ORDER — FENTANYL CITRATE 50 UG/ML
INJECTION, SOLUTION INTRAMUSCULAR; INTRAVENOUS PRN
Status: DISCONTINUED | OUTPATIENT
Start: 2021-02-17 | End: 2021-02-17 | Stop reason: SDUPTHER

## 2021-02-17 RX ORDER — MIDAZOLAM HYDROCHLORIDE 1 MG/ML
INJECTION INTRAMUSCULAR; INTRAVENOUS PRN
Status: DISCONTINUED | OUTPATIENT
Start: 2021-02-17 | End: 2021-02-17 | Stop reason: SDUPTHER

## 2021-02-17 RX ORDER — ONDANSETRON 2 MG/ML
INJECTION INTRAMUSCULAR; INTRAVENOUS PRN
Status: DISCONTINUED | OUTPATIENT
Start: 2021-02-17 | End: 2021-02-17 | Stop reason: SDUPTHER

## 2021-02-17 RX ORDER — SUCCINYLCHOLINE/SOD CL,ISO/PF 200MG/10ML
SYRINGE (ML) INTRAVENOUS PRN
Status: DISCONTINUED | OUTPATIENT
Start: 2021-02-17 | End: 2021-02-17 | Stop reason: SDUPTHER

## 2021-02-17 RX ORDER — SODIUM CHLORIDE 0.9 % (FLUSH) 0.9 %
10 SYRINGE (ML) INJECTION EVERY 12 HOURS SCHEDULED
Status: DISCONTINUED | OUTPATIENT
Start: 2021-02-17 | End: 2021-02-17 | Stop reason: HOSPADM

## 2021-02-17 RX ADMIN — ROCURONIUM BROMIDE 25 MG: 10 INJECTION INTRAVENOUS at 07:42

## 2021-02-17 RX ADMIN — FENTANYL CITRATE 100 MCG: 50 INJECTION INTRAMUSCULAR; INTRAVENOUS at 07:25

## 2021-02-17 RX ADMIN — BUPIVACAINE HYDROCHLORIDE 30 ML: 5 INJECTION, SOLUTION EPIDURAL; INTRACAUDAL; PERINEURAL at 07:07

## 2021-02-17 RX ADMIN — CEFAZOLIN 2000 MG: 10 INJECTION, POWDER, FOR SOLUTION INTRAVENOUS at 07:19

## 2021-02-17 RX ADMIN — DEXAMETHASONE SODIUM PHOSPHATE 8 MG: 4 INJECTION, SOLUTION INTRAMUSCULAR; INTRAVENOUS at 07:40

## 2021-02-17 RX ADMIN — ONDANSETRON 4 MG: 2 INJECTION INTRAMUSCULAR; INTRAVENOUS at 08:15

## 2021-02-17 RX ADMIN — MIDAZOLAM 2 MG: 1 INJECTION INTRAMUSCULAR; INTRAVENOUS at 07:07

## 2021-02-17 RX ADMIN — ROCURONIUM BROMIDE 5 MG: 10 INJECTION INTRAVENOUS at 07:26

## 2021-02-17 RX ADMIN — Medication 140 MG: at 07:26

## 2021-02-17 RX ADMIN — SUGAMMADEX 150 MG: 100 INJECTION, SOLUTION INTRAVENOUS at 08:33

## 2021-02-17 RX ADMIN — SUGAMMADEX 50 MG: 100 INJECTION, SOLUTION INTRAVENOUS at 08:30

## 2021-02-17 RX ADMIN — PROPOFOL 200 MG: 10 INJECTION, EMULSION INTRAVENOUS at 07:26

## 2021-02-17 RX ADMIN — LIDOCAINE HYDROCHLORIDE 100 MG: 20 INJECTION, SOLUTION EPIDURAL; INFILTRATION; INTRACAUDAL; PERINEURAL at 07:26

## 2021-02-17 RX ADMIN — SODIUM CHLORIDE: 9 INJECTION, SOLUTION INTRAVENOUS at 06:37

## 2021-02-17 ASSESSMENT — PULMONARY FUNCTION TESTS
PIF_VALUE: 27
PIF_VALUE: 3
PIF_VALUE: 19
PIF_VALUE: 8
PIF_VALUE: 4
PIF_VALUE: 3
PIF_VALUE: 20
PIF_VALUE: 0
PIF_VALUE: 19
PIF_VALUE: 19
PIF_VALUE: 0
PIF_VALUE: 3
PIF_VALUE: 20
PIF_VALUE: 19
PIF_VALUE: 13
PIF_VALUE: 21
PIF_VALUE: 18
PIF_VALUE: 18
PIF_VALUE: 0
PIF_VALUE: 19
PIF_VALUE: 16
PIF_VALUE: 18
PIF_VALUE: 3
PIF_VALUE: 20
PIF_VALUE: 19
PIF_VALUE: 20
PIF_VALUE: 19
PIF_VALUE: 21
PIF_VALUE: 3
PIF_VALUE: 1
PIF_VALUE: 19
PIF_VALUE: 19
PIF_VALUE: 20
PIF_VALUE: 1
PIF_VALUE: 20
PIF_VALUE: 18
PIF_VALUE: 20
PIF_VALUE: 19
PIF_VALUE: 19
PIF_VALUE: 17
PIF_VALUE: 20
PIF_VALUE: 3
PIF_VALUE: 20
PIF_VALUE: 3
PIF_VALUE: 19
PIF_VALUE: 20

## 2021-02-17 ASSESSMENT — PAIN SCALES - GENERAL
PAINLEVEL_OUTOF10: 0

## 2021-02-17 ASSESSMENT — PAIN DESCRIPTION - DESCRIPTORS: DESCRIPTORS: ACHING

## 2021-02-17 ASSESSMENT — PAIN - FUNCTIONAL ASSESSMENT: PAIN_FUNCTIONAL_ASSESSMENT: PREVENTS OR INTERFERES SOME ACTIVE ACTIVITIES AND ADLS

## 2021-02-17 ASSESSMENT — ENCOUNTER SYMPTOMS: SHORTNESS OF BREATH: 0

## 2021-02-17 NOTE — ANESTHESIA PROCEDURE NOTES
Peripheral Block    Patient location during procedure: pre-op  Staffing  Performed: anesthesiologist   Anesthesiologist: Chetna Sommer MD  Preanesthetic Checklist  Completed: patient identified, IV checked, site marked, risks and benefits discussed, surgical consent, monitors and equipment checked, pre-op evaluation, timeout performed, anesthesia consent given, oxygen available and patient being monitored  Peripheral Block  Patient position: sitting  Prep: ChloraPrep  Patient monitoring: cardiac monitor, continuous pulse ox, frequent blood pressure checks and IV access  Block type: Brachial plexus  Laterality: left  Injection technique: single-shot  Guidance: ultrasound guided  Local infiltration: lidocaine  Infiltration strength: 1 %  Dose: 3 mL  Interscalene  Provider prep: mask and sterile gloves  Local infiltration: lidocaine  Needle  Needle type: combined needle/nerve stimulator   Needle gauge: 21 G  Needle length: 10 cm  Needle localization: ultrasound guidance  Assessment  Injection assessment: negative aspiration for heme, no paresthesia on injection and local visualized surrounding nerve on ultrasound  Paresthesia pain: none  Slow fractionated injection: yes  Hemodynamics: stable  Additional Notes  Immediately prior to procedure a \"time out\" was called to verify the correct patient, allergies, laterality, procedure and equipment. Time out performed with  RN    Local Anesthetic: 0.5 %  Bupivacaine   Amount: 30 ml  in 5 ml increments after negative aspiration each time. Anterior scalene and middle scalene muscles, upper, middle and lower trunks of the brachial plexus are identified, the tip of the needle and the spread of the local anesthetic around the brachial plexus are visualized. The Brachial plexus appeared to be anatomically normal and there were no abnormal pathologically findings seen.          Medications Administered  Bupivacaine (MARCAINE) PF injection 0.5%, 30 mL Reason for block: post-op pain management and at surgeon's request

## 2021-02-17 NOTE — DISCHARGE INSTR - DIET
? Good nutrition is important when healing from an illness, injury, or surgery. Follow any nutrition recommendations given to you during your hospital stay. ? If you were given an oral nutrition supplement while in the hospital, continue to take this supplement at home. You can take it with meals, in-between meals, and/or before bedtime. These supplements can be purchased at most local grocery stores, pharmacies, and chain [employer]-stores. ? If you have any questions about your diet or nutrition, call the hospital and ask for the dietitian.   Advance to regular as tolerated

## 2021-02-17 NOTE — PROGRESS NOTES
Arrives to pacu drowsy, respirations easy, follows commands and appears to rest/sleep when undisturbed

## 2021-02-17 NOTE — OP NOTE
[address]                                OPERATIVE REPORT    PATIENT NAME: Delores Kirby                    :        1980  MED REC NO:   [id number]                          ROOM:  ACCOUNT NO:   [de-identified]                           ADMIT DATE: 2021  PROVIDER:     Eligio Stockton MD    DATE OF PROCEDURE:  2021    PREOPERATIVE DIAGNOSES:  Left shoulder impingement, AC arthrosis and  biceps tendon injury with labrum tear. POSTOPERATIVE DIAGNOSES:  Left shoulder impingement, AC arthrosis and  biceps tendon injury with labrum tear. OPERATION PERFORMED:  1. Arthroscopy of the left shoulder with extensive intra-articular  debridement including debridement of labrum and completion of biceps  tendon tenotomy. 2.  Arthroscopy of the left shoulder with subacromial decompression. 3.  Arthroscopy of the left shoulder with distal clavicle excision. 4.  Open subpectoral biceps tenodesis. SURGEON:  Eligio Stockton MD    FIRST ASSISTANT:  Dr. Conley Phoenix. ANESTHESIA:  General.    ANTIBIOTICS:  Ancef. ESTIMATED BLOOD LOSS:  Minimal.    COMPLICATIONS:  None apparent. INDICATIONS:  The patient is a 25-year-old labor, who works in [employer]. He  had right shoulder surgery last year and did well. He was having  similar symptoms on the left with biceps tendon symptoms and AC joint  pain. He failed conservative management. He understood the risks,  benefits, and alternatives of the operation, he was eager to proceed. OPERATIVE PROCEDURE:  The patient was identified in the preop holding  area. Informed consent had been obtained. The operative site of the  left shoulder was marked by me with my initials. He was brought to the  operating room and placed under general anesthesia. Examination of the  left shoulder under anesthesia revealed stable, complete motion.   The left upper extremity was prepped and draped in standard sterile fashion  with alcohol and ChloraPrep. After time-out, subacromial space and  subpectoral region were anesthetized with Marcaine with epinephrine. We started with the scope in the posterior working portal.  The rotator  interval portal created from outside-in under needle guidance. Findings  as below;  1. The biceps tendon had frayed extensively and was nearly completely  ruptured. 2.  The subscapularis had some fraying, but no full-thickness tear, and  the supraspinatus was intact. The labrum was diffusely torn. After thoroughly evaluating the entirety of the intra-articular space,  we proceeded with extensive debridement including the posterior and  inferior labral tissue and we completed the biceps tenotomy and debrided  that stump back. We debrided the upper border of the subscap and then  proceeded to the subacromial space where thick, inflamed bursal tissue  was encountered. It was resected. The rotator cuff was intact and  looked good. CA ligament was released. Bony spur was smoothed over  anteriorly. We identified the [facility] joint, removed the meniscoid remnant,  and then performed a distal clavicle excision to allow full 1 cm of  clearance between the clavicle and acromion. We then proceeded with the open portion of the case. A 3-cm incision  was made just off the pectoralis. The biceps tendon was identified, it  was removed from the wound. It was markedly diseased. A whipstitch was  placed. It was then tenodesed in the proximal humerus subpectoral  region with a [employer] tenodesis screw. Deep tissue was irrigated and closed with 0 Vicryl, 2-0 Vicryl and the  skin with a Monocryl. The arthroscopic portals were closed with nylon. Steri-Strips, sterile dressing, and sling were applied. The patient was  awoken and transported to recovery without complication.         Jessica Marshall MD D: 02/17/2021 8:45:14       T: 02/17/2021 9:53:06     [email or web address]  Job#: [id number]     Doc#: [id number]    CC:

## 2021-02-17 NOTE — ANESTHESIA POSTPROCEDURE EVALUATION
02/17/21 0622, BP:(!) 144/77, Temp:97.4 °F (36.3 °C), Temp src:Temporal, Pulse:62, Resp:14, SpO2:95 %  02/17/21 0607, Height:5' 7\" (1.702 m), Weight:215 lb (97.5 kg)     LABS:    CBC  No results found for: WBC, HGB, HCT, PLT  RENAL  No results found for: NA, K, CL, CO2, BUN, CREATININE, GLUCOSE  COAGS  No results found for: PROTIME, INR, APTT    Intake & Output:  @90GVDM@    Nausea & Vomiting:  No    Level of Consciousness:  Awake    Pain Assessment:  Adequate analgesia    Anesthesia Complications:  No apparent anesthetic complications    SUMMARY      Vital signs stable  OK to discharge from Stage I post anesthesia care.   Care transferred from Anesthesiology department on discharge from perioperative area

## 2021-02-17 NOTE — ANESTHESIA PRE PROCEDURE
[facility] Department of Anesthesiology  Pre-Anesthesia Evaluation/Consultation       Name:  Anne Jimenez  : 1980  Age:  41 y.o. MRN:  [id number]  Date: 2021           Surgeon: Surgeon(s):  Tracie Dover MD    Procedure: Procedure(s):  LEFT SHOULDER ARTHROSCOPY, DECOMPRESSION CORRIE BICEP TENODESIS     No Known Allergies  Patient Active Problem List   Diagnosis    Chondromalacia patellae of right knee    Primary osteoarthritis of right knee    Right knee pain    Synovitis of right knee    Prepatellar bursitis of right knee    Numbness of right anterior thigh    Lumbar spine pain    Right arm pain    DDD (degenerative disc disease), cervical    Shoulder impingement, right    Tendinopathy of right rotator cuff    Chronic right shoulder pain    AC joint arthropathy     Past Medical History:   Diagnosis Date    Arthritis     Right Knee    Diverticulitis 2017     Past Surgical History:   Procedure Laterality Date    DENTAL SURGERY      Removal molars    SHOULDER ARTHROSCOPY Right 2019    RIGHT SHOULDER ARTHROSCOPY, DEBRIDEMENT BICEPS TENOTOMY DECOMPRESSION Cable OPEN SUBPECTORAL BICEP TENODESIS performed by Tracie Dover MD at [facility] History     Tobacco Use    Smoking status: Former Smoker    Smokeless tobacco: Never Used    Tobacco comment: quit 2016   Substance Use Topics    Alcohol use: Yes     Comment: Social    Drug use: Never     Medications  No current facility-administered medications on file prior to encounter.       Current Outpatient Medications on File Prior to Encounter   Medication Sig Dispense Refill    PROBIOTIC PRODUCT PO Take 1 capsule by mouth daily as needed        Current Facility-Administered Medications   Medication Dose Route Frequency Provider Last Rate Last Admin    ceFAZolin (ANCEF) 2000 mg in dextrose 5 % 100 mL IVPB  2,000 mg Intravenous Once Tracie Dover MD POCGlucose  No results for input(s): GLUCOSE in the last 72 hours. Coags  No results found for: PROTIME, INR, APTT  HCG (If Applicable) No results found for: PREGTESTUR, PREGSERUM, HCG, HCGQUANT   ABGs No results found for: PHART, PO2ART, KCK5VRO, TPA1IET, BEART, W8DKQYBS   Type & Screen (If Applicable)  No results found for: LABABO, LABRH                         BMI: Wt Readings from Last 3 Encounters:       NPO Status:   Date of last liquid consumption: 02/16/21   Time of last liquid consumption: 2200   Date of last solid food consumption: 02/16/21      Time of last solid consumption: 2000       Anesthesia Evaluation  Patient summary reviewed  Airway: Mallampati: III  TM distance: >3 FB   Neck ROM: full  Mouth opening: > = 3 FB Dental: normal exam         Pulmonary:       (-) COPD, asthma and shortness of breath                           Cardiovascular:        (-) hypertension, valvular problems/murmurs, past MI, CAD, CABG/stent, dysrhythmias and  angina                Neuro/Psych:      (-) seizures, TIA and CVA           GI/Hepatic/Renal:        (-) GERD, PUD, liver disease and no renal disease       Endo/Other:    (+) : arthritis:., .    (-) diabetes mellitus               Abdominal:           Vascular: negative vascular ROS. Anesthesia Plan      general     ASA 2     (I discussed with the patient the risks and benefits of PIV, general anesthesia, IV Narcotics, PACU. All questions were answered the patient agrees with the plan.)  Induction: intravenous. Anesthetic plan and risks discussed with patient. Plan discussed with CRNA.                   This pre-anesthesia assessment may be used as a history and physical.    DOS STAFF ADDENDUM: Pt seen and examined, chart reviewed (including anesthesia, drug and allergy history). No interval changes to history and physical examination. Anesthetic plan, risks, benefits, alternatives, and personnel involved discussed with patient. Patient verbalized an understanding and agrees to proceed.       Frank Reina MD  February 17, 2021  6:45 AM

## 2021-02-17 NOTE — PROGRESS NOTES
Ambulatory Surgery/Procedure Discharge Note    Vitals:    02/17/21 0940   BP: 127/88   Pulse: 66   Resp: 18   Temp: 96.8 °F (36 °C)   SpO2: 93%       In: 700 [I.V.:700]  Out: 20     Restroom use offered before discharge. Yes    Pain assessment:  none  Pain Level: 0    Discharge instructions given to patient and his wife. Verbalizes understanding    Patient discharged to home/self care.  Patient discharged via wheel chair by transporter to waiting family/S.O.       2/17/2021 9:48 AM

## 2021-02-18 ENCOUNTER — HOSPITAL ENCOUNTER (OUTPATIENT)
Dept: PHYSICAL THERAPY | Age: 41
Setting detail: THERAPIES SERIES
Discharge: HOME | End: 2021-02-18
Payer: COMMERCIAL

## 2021-02-18 ENCOUNTER — OFFICE VISIT (OUTPATIENT)
Dept: ORTHOPEDIC SURGERY | Age: 41
End: 2021-02-18

## 2021-02-18 VITALS — BODY MASS INDEX: 33.74 KG/M2 | WEIGHT: 215 LBS | HEIGHT: 67 IN

## 2021-02-18 DIAGNOSIS — M25.512 ACUTE PAIN OF LEFT SHOULDER: Primary | ICD-10-CM

## 2021-02-18 DIAGNOSIS — M75.22 BICEPS TENDONITIS, LEFT: ICD-10-CM

## 2021-02-18 DIAGNOSIS — M75.42 SHOULDER IMPINGEMENT, LEFT: ICD-10-CM

## 2021-02-18 PROCEDURE — 97161 PT EVAL LOW COMPLEX 20 MIN: CPT

## 2021-02-18 PROCEDURE — 99024 POSTOP FOLLOW-UP VISIT: CPT | Performed by: ORTHOPAEDIC SURGERY

## 2021-02-18 PROCEDURE — 97530 THERAPEUTIC ACTIVITIES: CPT

## 2021-02-18 PROCEDURE — 97110 THERAPEUTIC EXERCISES: CPT

## 2021-02-18 NOTE — PROGRESS NOTES
Danny Subramanian returns today to follow-up his left shoulder. He is 1 day status post arthroscopy with decompression, Hilda, and biceps tenodesis. He is doing well. Pain is appropriately managed. I changed his bandages and placed new Steri-Strips and Tegaderm. Incisions look good without infection. Neurologic and vascular exams of left upper extremity are normal.    I reviewed his arthroscopic photos with him. He will start therapy. Follow-up with me in a week for suture removal.          Patient's medications, allergies, past medical, surgical, social and family histories were reviewed and updated as appropriate. Relevant review of systems reviewed. This note was created using voice recognition software. It has been proofread, but occasionally errors remain. Please disregard these errors. They will be corrected as they are noted.

## 2021-02-18 NOTE — FLOWSHEET NOTE
[facility], [place]      Physical Therapy Daily Treatment Note  Date:  2021    Patient Name:  Sonu Coy    :  1980  MRN: [id number]  Medical/Treatment Diagnosis Information:  · Diagnosis: M25.512 (ICD-10-CM) - Acute pain of left shoulder  · Treatment Diagnosis: M25.512 (ICD-10-CM) - Acute pain of left shoulder  Insurance/Certification information:  PT Insurance Information: [place]  Physician Information:  Referring Practitioner: Regino Schwab  Has the plan of care been signed (Y/N):        []  Yes  [x]  No     Date of Patient follow up with Physician: 21      Is this a Progress Report:     []  Yes  [x]  No        Progress report will be due (10 Rx or 30 days whichever is less):       Recertification will be due (POC Duration  / 90 days whichever is less):         Visit # Insurance Allowable Auth Required   1 30 []  Yes []  No        Functional Scale: UEFI 100% dis    Date assessed:  21      Latex Allergy:  [x]NO      []YES  Preferred Language for Healthcare:   [x]English       []other:    Pain level:  8/10     SUBJECTIVE:  See eval    OBJECTIVE: See eval  ? Observation:   ? Test measurements:      ROM PROM AROM  Comment    L R L R    Flexion        Abduction        ER        IR        Other        Other             Strength L R Comment   Flexion      Abduction      ER      IR      Supraspinatus      Upper Trap      Lower Trap      Mid Trap      Rhomboids      Biceps      Triceps      Horizontal Abduction      Horizontal Adduction      Lats          RESTRICTIONS/PRECAUTIONS: sling for 4 weeks, no resisted bicep, no CBA      Exercises:  Exercise/Equipment Resistance Repetitions Other comments   Stretching/PROM      Wand      Table Slides  Flexion 10x10    UE [place]      Pulleys      Pendulum      PROM Elbow  10x10    Isometrics      Retraction        3'    Weight shift      Flexion      Abduction      External Rotation      Internal Rotation [] (88985) Reviewed/Progressed HEP activities related to improving balance, coordination, kinesthetic sense, posture, motor skill, proprioception of scapular, scapulothoracic and UE control with self care, reaching, carrying, lifting, house/yardwork, driving/computer work      Manual Treatments:  PROM / STM / Oscillations-Mobs:  G-I, II, III, IV (PA's, Inf., Post.)  [] (35174) Provided manual therapy to mobilize soft tissue/joints of cervical/CT, scapular GHJ and UE for the purpose of modulating pain, promoting relaxation,  increasing ROM, reducing/eliminating soft tissue swelling/inflammation/restriction, improving soft tissue extensibility and allowing for proper ROM for normal function with self care, reaching, carrying, lifting, house/yardwork, driving/computer work    Modalities:  15' ice    Charges:  Timed Code Treatment Minutes: 24   Total Treatment Minutes: 45     [x] EVAL (LOW) 52276   [] EVAL (MOD) 27065   [] EVAL (HIGH) 82989   [] RE-EVAL   [x] XV(40356) x 1    [] IONTO  [] NMR (28961) x     [] VASO  [] Manual (80745) x      [] Other:  [x] TA x  1    [] Mech Traction (60084)  [] ES(attended) (66341)      [] ES (un) (26516):       GOALS:  Patient stated goal: return use of L arm with less pain     [] Progressing: [] Met: [] Not Met: [] Adjusted    Therapist goals for Patient:   Short Term Goals: To be achieved in: 2 weeks  1. Independent in HEP and progression per patient tolerance, in order to prevent re-injury. [] Progressing: [] Met: [] Not Met: [] Adjusted   2. Patient will have a decrease in pain to facilitate improvement in movement, function, and ADLs as indicated by Functional Deficits. [] Progressing: [] Met: [] Not Met: [] Adjusted    Long Term Goals: To be achieved in: 6-8 weeks  1. Disability index score of 19% or less for the UEFI to assist with reaching prior level of function.    [] Progressing: [] Met: [] Not Met: [] Adjusted 2. Patient will demonstrate increased AROM to 150+ flex, 70+ ER, 45+ IR to allow for proper joint functioning as indicated by patients Functional Deficits. [] Progressing: [] Met: [] Not Met: [] Adjusted  3. Patient will demonstrate an increase in Strength to 4+/5 or greater  to allow for proper functional mobility as indicated by patients Functional Deficits. [] Progressing: [] Met: [] Not Met: [] Adjusted  4. Patient will return to ADL such as bathing/grooming/dressing without increased symptoms or restriction. [] Progressing: [] Met: [] Not Met: [] Adjusted  5. Patient will return to lifting 20lbs or greater to begin return to work progression without increased symptoms or restriction. [] Progressing: [] Met: [] Not Met: [] Adjusted     Access Code: [email or web address]  URL: [email or web address]  Date: 02/18/2021  Prepared by: Becca Ledezma    Exercises  Seated Shoulder Flexion Towel Slide at Table Top - 10 reps - 1 sets - 10 hold - 1x daily - 7x weekly  Supported Elbow Flexion Extension PROM - 10 reps - 1 sets - 10 hold - 1x daily - 7x weekly  Seated Shoulder Shrugs - 10 reps - 3 sets - 1x daily - 7x weekly  Seated Scapular Retraction - 10 reps - 3 sets - 1x daily - 7x weekly  Seated Gripping Towel - 10 reps - 3 sets - 1x daily - 7x weekly  Overall Progression Towards Functional goals/ Treatment Progress Update:  [] Patient is progressing as expected towards functional goals listed. [] Progression is slowed due to complexities/Impairments listed. [] Progression has been slowed due to co-morbidities.   [x] Plan just implemented, too soon to assess goals progression <30days   [] Goals require adjustment due to lack of progress  [] Patient is not progressing as expected and requires additional follow up with physician  [] Other    Prognosis for POC: [x] Good [] Fair  [] Poor      Patient requires continued skilled intervention: [x] Yes  [] No    Treatment/Activity Tolerance:

## 2021-02-18 NOTE — PLAN OF CARE
[address], [address] [place], [address]  Phone: [phone]   Fax: [phone]        Physical Therapy Certification    Dear Referring Practitioner: Magali Andrade,    We had the pleasure of evaluating the following patient for physical therapy services at [facility]. A summary of our findings can be found in the initial assessment below. This includes our plan of care. If you have any questions or concerns regarding these findings, please do not hesitate to contact me at the office phone number checked above. Thank you for the referral.       Physician Signature:_______________________________Date:__________________  By signing above (or electronic signature), therapists plan is approved by physician      Patient: Srinath Gan   : 1980   MRN: [id number]  Referring Physician: Referring Practitioner: Magali Andrade      Evaluation Date: 2021      Medical Diagnosis Information:  Diagnosis: K53.908 (ICD-10-CM) - Acute pain of left shoulder   Treatment Diagnosis: M25.512 (ICD-10-CM) - Acute pain of left shoulder                                           Precautions/ Contra-indications:   Latex Allergy:  [x]NO      []YES  Preferred Language for Healthcare:   [x]English       []Other:    C-SSRS Triggered by Intake questionnaire (Past 2 wk assessment):   [x] No, Questionnaire did not trigger screening.   [] Yes, Patient intake triggered further evaluation      [] C-SSRS Screening completed  [] PCP notified via Plan of Care  [] Emergency services notified     SUBJECTIVE: Patient stated complaint: Pt is s/p 1 day from SAD/Hilda/biceps tenodesis. He notes moderate pain today as block wore off last night and he has not taken pain medication yet today.      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 His job is very physically demanding; requires carrying a 70 pound toolbox and has to lift about 30-40 pounds overhead. He has to climb ladders while carrying toolbox or other objects    Living Status/Prior Level of Function: Independent with ADLs and IADLs,     OBJECTIVE:     CERV ROM     Cervical Flexion     Cervical Extension     Cervical SB     Cervical rotation     Reflexes/Sensation (myotomes/dermatomes): intact     Joint mobility: \   []Normal    [x]Hypo   []Hyper    Palpation: ttp near axillary incision     Functional Mobility/Transfers:     Posture:     Bandages/Dressings/Incisions:  Bandages were removed and steri-strips were inspected. Shoulder was cleaned with rubbing alcohol and appropriate dressing applied. Gait: (include devices/WB status) l    Orthopedic Special Tests:         Palpation Scale- Villasenor and Viky- (Grade 0-4)     Description X / -- Comments   Grade 0 No tenderness     Grade 1 Mild tenderness without grimace or flinch     Grade 2  Moderate tenderness plus grimace or flinch x    Grade 3  Severe tenderness plus marked flinch or withdrawal     Grade 4 Unbearable tenderness; patient withdrawals with light touch      DR Jacklyn, Jose Cunningham GM. Myofascial trigger points show spontaneous needle emg activity. Spine 1993; 16 :[id number]. [x] Patient history, allergies, meds reviewed. Medical chart reviewed. See intake form.      Review Of Systems (ROS): [x]Performed Review of systems (Integumentary, CardioPulmonary, Neurological) by intake and observation. Intake form has been scanned into medical record. Patient has been instructed to contact their primary care physician regarding ROS issues if not already being addressed at this time. Co-morbidities/Complexities (which will affect course of rehabilitation):   []None           Arthritic conditions   []Rheumatoid arthritis (M05.9)  []Osteoarthritis (M19.91)   Cardiovascular conditions   []Hypertension (I10)  []Hyperlipidemia (E78.5)  []Angina pectoris (I20)  []Atherosclerosis (I70)   Musculoskeletal conditions   []Disc pathology   []Congenital spine pathologies   [x]Prior surgical intervention  []Osteoporosis (M81.8)  []Osteopenia (M85.8)   Endocrine conditions   []Hypothyroid (E03.9)  []Hyperthyroid Gastrointestinal conditions   []Constipation (D70.11)   Metabolic conditions   []Morbid obesity (E66.01)  []Diabetes type 1(E10.65) or 2 (E11.65)   []Neuropathy (G60.9)     Pulmonary conditions   []Asthma (J45)  []Coughing   []COPD (J44.9)   Psychological Disorders  []Anxiety (F41.9)  []Depression (F32.9)   []Other:   []Other:          Barriers to/and or personal factors that will affect rehab potential:              []Age  []Sex              []Motivation/Lack of Motivation                        []Co-Morbidities              []Cognitive Function, education/learning barriers              []Environmental, home barriers              [x]profession/work barriers  [x]past PT/medical experience  []other:       Falls Risk Assessment (30 days):  [x] Falls Risk assessed and no intervention required.   [] Falls Risk assessed and Patient requires intervention due to being higher risk   TUG score (>12s at risk):     [] Falls education provided, including       Functional Assessment:    Functional Assessment scale used: UEFI  Score: 100% dis       ASSESSMENT:   Functional Impairments   []Noted spinal or UE joint hypomobility 2. Patient will demonstrate increased AROM to 150+ flex, 70+ ER, 45+ IR to allow for proper joint functioning as indicated by patients Functional Deficits. [] Progressing: [] Met: [] Not Met: [] Adjusted  3. Patient will demonstrate an increase in Strength to 4+/5 or greater  to allow for proper functional mobility as indicated by patients Functional Deficits. [] Progressing: [] Met: [] Not Met: [] Adjusted  4. Patient will return to ADL such as bathing/grooming/dressing without increased symptoms or restriction. [] Progressing: [] Met: [] Not Met: [] Adjusted  5. Patient will return to lifting 20lbs or greater to begin return to work progression without increased symptoms or restriction. [] Progressing: [] Met: [] Not Met: [] Adjusted     Access Code: [email or web address]  URL: [email or web address]  Date: 02/18/2021  Prepared by: Miguel Day    Exercises  Seated Shoulder Flexion Towel Slide at Table Top - 10 reps - 1 sets - 10 hold - 1x daily - 7x weekly  Supported Elbow Flexion Extension PROM - 10 reps - 1 sets - 10 hold - 1x daily - 7x weekly  Seated Shoulder Shrugs - 10 reps - 3 sets - 1x daily - 7x weekly  Seated Scapular Retraction - 10 reps - 3 sets - 1x daily - 7x weekly  Seated Gripping Towel - 10 reps - 3 sets - 1x daily - 7x weekly     Physical Therapy Evaluation Complexity Justification  [] A history of present problem with:  [] no personal factors and/or comorbidities that impact the plan of care;  [x]1-2 personal factors and/or comorbidities that impact the plan of care  []3 personal factors and/or comorbidities that impact the plan of care  [] An examination of body systems using standardized tests and measures addressing any of the following: body structures and functions (impairments), activity limitations, and/or participation restrictions;:  [] a total of 1-2 or more elements   [x] a total of 3 or more elements   [] a total of 4 or more elements   [] A clinical presentation with: [x] stable and/or uncomplicated characteristics   [] evolving clinical presentation with changing characteristics  [] unstable and unpredictable characteristics;   [] Clinical decision making of [] low, [] moderate, [] high complexity using standardized patient assessment instrument and/or measurable assessment of functional outcome.     [x] EVAL (LOW) 55579 (typically 20 minutes face-to-face)  [] EVAL (MOD) 68752 (typically 30 minutes face-to-face)  [] EVAL (HIGH) 20833 (typically 45 minutes face-to-face)  [] RE-EVAL 17086    Electronically signed by:    Becca Ledezma, PT, DPT, Cert DN

## 2021-02-25 ENCOUNTER — OFFICE VISIT (OUTPATIENT)
Dept: ORTHOPEDIC SURGERY | Age: 41
End: 2021-02-25

## 2021-02-25 ENCOUNTER — HOSPITAL ENCOUNTER (OUTPATIENT)
Dept: PHYSICAL THERAPY | Age: 41
Setting detail: THERAPIES SERIES
Discharge: HOME | End: 2021-02-25
Payer: COMMERCIAL

## 2021-02-25 VITALS — BODY MASS INDEX: 33.74 KG/M2 | HEIGHT: 67 IN | WEIGHT: 215 LBS

## 2021-02-25 DIAGNOSIS — M75.22 BICEPS TENDONITIS, LEFT: ICD-10-CM

## 2021-02-25 DIAGNOSIS — M25.512 ACUTE PAIN OF LEFT SHOULDER: Primary | ICD-10-CM

## 2021-02-25 DIAGNOSIS — M75.42 SHOULDER IMPINGEMENT, LEFT: ICD-10-CM

## 2021-02-25 PROCEDURE — 97530 THERAPEUTIC ACTIVITIES: CPT

## 2021-02-25 PROCEDURE — 99024 POSTOP FOLLOW-UP VISIT: CPT | Performed by: ORTHOPAEDIC SURGERY

## 2021-02-25 PROCEDURE — 97110 THERAPEUTIC EXERCISES: CPT

## 2021-02-25 NOTE — PROGRESS NOTES
Nathan Rodrgiuez returns today status post left shoulder arthroscopy. He is doing well. He has pain that is well controlled. Today, incisions look good without infection. I removed his sutures and placed new Steri-Strips and his incisions are healing nicely. We will continue with rehab and follow-up with me in 3 weeks. Patient's medications, allergies, past medical, surgical, social and family histories were reviewed and updated as appropriate. Relevant review of systems reviewed. This note was created using voice recognition software. It has been proofread, but occasionally errors remain. Please disregard these errors. They will be corrected as they are noted.

## 2021-02-25 NOTE — FLOWSHEET NOTE
[facility], [place]      Physical Therapy Daily Treatment Note  Date:  2021    Patient Name:  Ale Mistry    :  1980  MRN: [id number]  Medical/Treatment Diagnosis Information:  · Diagnosis: M25.512 (ICD-10-CM) - Acute pain of left shoulder  · Treatment Diagnosis: M25.512 (ICD-10-CM) - Acute pain of left shoulder  Insurance/Certification information:  PT Insurance Information: [place]  Physician Information:  Referring Practitioner: Diamante Powers  Has the plan of care been signed (Y/N):        []  Yes  [x]  No     Date of Patient follow up with Physician: 21      Is this a Progress Report:     []  Yes  [x]  No        Progress report will be due (10 Rx or 30 days whichever is less): 31      Recertification will be due (POC Duration  / 90 days whichever is less):         Visit # Insurance Allowable Auth Required   2 30 []  Yes []  No        Functional Scale: UEFI 100% dis    Date assessed:  21      Latex Allergy:  [x]NO      []YES  Preferred Language for Healthcare:   [x]English       []other:    Pain level:  6/10     SUBJECTIVE:  Pt is 1 week s/p. He notes pain is beginning to improve, exercises are going well. He feels like sleeping is improving but is still uncomfortable.      OBJECTIVE: See eval   Observation:    Test measurements:      ROM PROM AROM  Comment    L R L R    Flexion        Abduction        ER        IR        Other        Other             Strength L R Comment   Flexion      Abduction      ER      IR      Supraspinatus      Upper Trap      Lower Trap      Mid Trap      Rhomboids      Biceps      Triceps      Horizontal Abduction      Horizontal Adduction      Lats          RESTRICTIONS/PRECAUTIONS: sling for 4 weeks, no resisted bicep, no CBA      Exercises:  Exercise/Equipment Resistance Repetitions Other comments   Stretching/PROM      Wand  ER seated 10x10     Table Slides  Flexion, scaption  10x10    UE [place]      Pulleys      Pendulum related to improving balance, coordination, kinesthetic sense, posture, motor skill, proprioception  to assist with  scapular, scapulothoracic and UE control with self care, reaching, carrying, lifting, house/yardwork, driving/computer work. Therapeutic Activities:    [] (57152 or 07482) Provided verbal/tactile cueing for activities related to improving balance, coordination, kinesthetic sense, posture, motor skill, proprioception and motor activation to allow for proper function of scapular, scapulothoracic and UE control with self care, carrying, lifting, driving/computer work.      Home Exercise Program:    [x] (94525) Reviewed/Progressed HEP activities related to strengthening, flexibility, endurance, ROM of scapular, scapulothoracic and UE control with self care, reaching, carrying, lifting, house/yardwork, driving/computer work  [] (31561) Reviewed/Progressed HEP activities related to improving balance, coordination, kinesthetic sense, posture, motor skill, proprioception of scapular, scapulothoracic and UE control with self care, reaching, carrying, lifting, house/yardwork, driving/computer work      Manual Treatments:  PROM / STM / Oscillations-Mobs:  G-I, II, III, IV (PA's, Inf., Post.)  [] (23076) Provided manual therapy to mobilize soft tissue/joints of cervical/CT, scapular GHJ and UE for the purpose of modulating pain, promoting relaxation,  increasing ROM, reducing/eliminating soft tissue swelling/inflammation/restriction, improving soft tissue extensibility and allowing for proper ROM for normal function with self care, reaching, carrying, lifting, house/yardwork, driving/computer work    Modalities:  15' ice    Charges:  Timed Code Treatment Minutes: 35   Total Treatment Minutes: 35     [] EVAL (LOW) 71303   [] EVAL (MOD) 20815   [] EVAL (HIGH) 68617   [] RE-EVAL   [x] AT(23541) x 1    [] IONTO  [] NMR (27899) x     [] VASO  [] Manual (79543) x      [] Other:  [x] TA x  1    [] Mech Traction (69007)  [] ES(attended) (23721)      [] ES (un) (42092):       GOALS:  Patient stated goal: return use of L arm with less pain     [] Progressing: [] Met: [] Not Met: [] Adjusted    Therapist goals for Patient:   Short Term Goals: To be achieved in: 2 weeks  1. Independent in HEP and progression per patient tolerance, in order to prevent re-injury. [] Progressing: [] Met: [] Not Met: [] Adjusted   2. Patient will have a decrease in pain to facilitate improvement in movement, function, and ADLs as indicated by Functional Deficits. [] Progressing: [] Met: [] Not Met: [] Adjusted    Long Term Goals: To be achieved in: 6-8 weeks  1. Disability index score of 19% or less for the UEFI to assist with reaching prior level of function. [] Progressing: [] Met: [] Not Met: [] Adjusted  2. Patient will demonstrate increased AROM to 150+ flex, 70+ ER, 45+ IR to allow for proper joint functioning as indicated by patients Functional Deficits. [] Progressing: [] Met: [] Not Met: [] Adjusted  3. Patient will demonstrate an increase in Strength to 4+/5 or greater  to allow for proper functional mobility as indicated by patients Functional Deficits. [] Progressing: [] Met: [] Not Met: [] Adjusted  4. Patient will return to ADL such as bathing/grooming/dressing without increased symptoms or restriction. [] Progressing: [] Met: [] Not Met: [] Adjusted  5. Patient will return to lifting 20lbs or greater to begin return to work progression without increased symptoms or restriction. [] Progressing: [] Met: [] Not Met: [] Adjusted       Overall Progression Towards Functional goals/ Treatment Progress Update:  [] Patient is progressing as expected towards functional goals listed. [] Progression is slowed due to complexities/Impairments listed. [] Progression has been slowed due to co-morbidities.   [x] Plan just implemented, too soon to assess goals progression <30days   [] Goals require adjustment due to lack of progress  [] Patient is not progressing as expected and requires additional follow up with physician  [] Other    Prognosis for POC: [x] Good [] Fair  [] Poor      Patient requires continued skilled intervention: [x] Yes  [] No    Treatment/Activity Tolerance:  [x] Patient able to complete treatment  [] Patient limited by fatigue  [] Patient limited by pain     [] Patient limited by other medical complications  [] Other:  Pt able to progress ROM today and light isometrics for RTC. Continue to progress as tolerated. Return to Play: (if applicable)   []  Stage 1: Intro to Strength   []  Stage 2: Return to Run and Strength   []  Stage 3: Return to Jump and Strength   []  Stage 4: Dynamic Strength and Agility   []  Stage 5: Sport Specific Training     []  Ready to Return to Play, Meets All Above Stages   []  Not Ready for Return to Sports   Comments:                               PLAN: See eval  [] Continue per plan of care [] Alter current plan (see comments above)  [x] Plan of care initiated [] Hold pending MD visit [] Discharge  Note: If patient does not return for scheduled/ recommended follow up visits, this note will serve as a discharge from care along with most recent update on progress. Reviewed insurance benefits for physical therapy in an outpatient hospital based setting with the patient, including deductible  and allowable visit number.  Pt was informed of possible out of pocket costs, as well as, informed of other service options for continuing supervised sessions without required skilled PT intervention such as the [facility] program.     Electronically signed by:  Lenny Clarke, PT, DPT, Cert FADY

## 2021-03-04 ENCOUNTER — HOSPITAL ENCOUNTER (OUTPATIENT)
Dept: PHYSICAL THERAPY | Age: 41
Setting detail: THERAPIES SERIES
Discharge: HOME | End: 2021-03-04
Payer: COMMERCIAL

## 2021-03-04 PROCEDURE — 97530 THERAPEUTIC ACTIVITIES: CPT

## 2021-03-04 PROCEDURE — 97110 THERAPEUTIC EXERCISES: CPT

## 2021-03-04 NOTE — FLOWSHEET NOTE
[place]  ER [id number]   Flex supine 10x10        UE [place]      Pulleys  Flexion, scaption 10x10     IR BB  10x10 cane     PROM Elbow  10x10    Isometrics      Retraction        3'    Weight shift      Flexion      Abduction  10x10     External Rotation  10x10     Internal Rotation      Biceps      Triceps            PRE's      Flexion      Abduction      External Rotation      Internal Rotation      Shrugs  3x10     EXT      Reverse Flys      Serratus      Horizontal Abd with ER      Biceps      Triceps      Retraction  3x10           Cable Column/Theraband      External Rotation      Internal Rotation      Shrugs      Lats      Ext      Flex      Scapular Retraction      BIC      TRIC      PNF            Dynamic Stability            [facility]            Access Code: [email or web address]  URL: [email or web address]  Date: 02/25/2021  Prepared by: Yadira Pouch    Exercises  Seated Shoulder Flexion Towel Slide at Table Top - 10 reps - 1 sets - 10 hold - 1x daily - 7x weekly  Seated Shoulder Scaption Slide at Table Top with Forearm in Neutral - 10 reps - 1 sets - 10 hold - 1x daily - 7x weekly  Seated Shoulder External Rotation AAROM with Dowel - 10 reps - 1 sets - 10 hold - 1x daily - 7x weekly  Supported Elbow Flexion Extension PROM - 10 reps - 1 sets - 10 hold - 1x daily - 7x weekly  Seated Shoulder Shrugs - 10 reps - 3 sets - 1x daily - 7x weekly  Seated Scapular Retraction - 10 reps - 3 sets - 1x daily - 7x weekly  Seated Gripping Towel - 10 reps - 3 sets - 1x daily - 7x weekly  Standing Isometric Shoulder Abduction with Doorway - Arm Bent - 10 reps - 10 hold - 1x daily - 7x weekly  Standing Isometric Shoulder External Rotation with Doorway - 10 reps - 10 hold - 1x daily - 7x weekly  Therapeutic Exercise and NMR EXR  [] (43434) Provided verbal/tactile cueing for activities related to strengthening, flexibility, endurance, ROM  for improvements in scapular, scapulothoracic and UE control with self care, reaching, carrying, lifting, house/yardwork, driving/computer work.    [] (45973) Provided verbal/tactile cueing for activities related to improving balance, coordination, kinesthetic sense, posture, motor skill, proprioception  to assist with  scapular, scapulothoracic and UE control with self care, reaching, carrying, lifting, house/yardwork, driving/computer work. Therapeutic Activities:    [] (38963 or 04680) Provided verbal/tactile cueing for activities related to improving balance, coordination, kinesthetic sense, posture, motor skill, proprioception and motor activation to allow for proper function of scapular, scapulothoracic and UE control with self care, carrying, lifting, driving/computer work.      Home Exercise Program:    [x] (80180) Reviewed/Progressed HEP activities related to strengthening, flexibility, endurance, ROM of scapular, scapulothoracic and UE control with self care, reaching, carrying, lifting, house/yardwork, driving/computer work  [] (26933) Reviewed/Progressed HEP activities related to improving balance, coordination, kinesthetic sense, posture, motor skill, proprioception of scapular, scapulothoracic and UE control with self care, reaching, carrying, lifting, house/yardwork, driving/computer work      Manual Treatments:  PROM / STM / Oscillations-Mobs:  G-I, II, III, IV (PA's, Inf., Post.)  [] (35859) Provided manual therapy to mobilize soft tissue/joints of cervical/CT, scapular GHJ and UE for the purpose of modulating pain, promoting relaxation,  increasing ROM, reducing/eliminating soft tissue swelling/inflammation/restriction, improving soft tissue extensibility and allowing for proper ROM for normal function with self care, reaching, carrying, lifting, house/yardwork, driving/computer work    Modalities:  15' ice    Charges:  Timed Code Treatment Minutes: 38   Total Treatment Minutes: 38     [] EVAL (LOW) 03592   [] EVAL (MOD) 97480   [] EVAL (HIGH) 17942   [] RE-EVAL   [x] HT(42788) x 2    [] IONTO  [] NMR (01256) x     [] VASO  [] Manual (08782) x      [] Other:  [x] TA x  1    [] Mech Traction (34719)  [] ES(attended) (35543)      [] ES (un) (19194):       GOALS:  Patient stated goal: return use of L arm with less pain     [] Progressing: [] Met: [] Not Met: [] Adjusted    Therapist goals for Patient:   Short Term Goals: To be achieved in: 2 weeks  1. Independent in HEP and progression per patient tolerance, in order to prevent re-injury. [] Progressing: [] Met: [] Not Met: [] Adjusted   2. Patient will have a decrease in pain to facilitate improvement in movement, function, and ADLs as indicated by Functional Deficits. [] Progressing: [] Met: [] Not Met: [] Adjusted    Long Term Goals: To be achieved in: 6-8 weeks  1. Disability index score of 19% or less for the UEFI to assist with reaching prior level of function. [] Progressing: [] Met: [] Not Met: [] Adjusted  2. Patient will demonstrate increased AROM to 150+ flex, 70+ ER, 45+ IR to allow for proper joint functioning as indicated by patients Functional Deficits. [] Progressing: [] Met: [] Not Met: [] Adjusted  3. Patient will demonstrate an increase in Strength to 4+/5 or greater  to allow for proper functional mobility as indicated by patients Functional Deficits. [] Progressing: [] Met: [] Not Met: [] Adjusted  4. Patient will return to ADL such as bathing/grooming/dressing without increased symptoms or restriction. [] Progressing: [] Met: [] Not Met: [] Adjusted  5. Patient will return to lifting 20lbs or greater to begin return to work progression without increased symptoms or restriction. [] Progressing: [] Met: [] Not Met: [] Adjusted       Overall Progression Towards Functional goals/ Treatment Progress Update:  [] Patient is progressing as expected towards functional goals listed. [] Progression is slowed due to complexities/Impairments listed.   [] Progression has been slowed due to co-morbidities. [x] Plan just implemented, too soon to assess goals progression <30days   [] Goals require adjustment due to lack of progress  [] Patient is not progressing as expected and requires additional follow up with physician  [] Other    Prognosis for POC: [x] Good [] Fair  [] Poor      Patient requires continued skilled intervention: [x] Yes  [] No    Treatment/Activity Tolerance:  [x] Patient able to complete treatment  [] Patient limited by fatigue  [] Patient limited by pain     [] Patient limited by other medical complications  [] Other:  Pt shows improving ROM today with less pain. Continue to progress as tolerated. Return to Play: (if applicable)   []  Stage 1: Intro to Strength   []  Stage 2: Return to Run and Strength   []  Stage 3: Return to Jump and Strength   []  Stage 4: Dynamic Strength and Agility   []  Stage 5: Sport Specific Training     []  Ready to Return to Play, Meets All Above Stages   []  Not Ready for Return to Sports   Comments:                               PLAN: See eval  [] Continue per plan of care [] Alter current plan (see comments above)  [x] Plan of care initiated [] Hold pending MD visit [] Discharge  Note: If patient does not return for scheduled/ recommended follow up visits, this note will serve as a discharge from care along with most recent update on progress. Reviewed insurance benefits for physical therapy in an outpatient hospital based setting with the patient, including deductible  and allowable visit number.  Pt was informed of possible out of pocket costs, as well as, informed of other service options for continuing supervised sessions without required skilled PT intervention such as the cash based [employer] program.     Electronically signed by:  Sidney Nuñez, PT, DPT, Cert DN

## 2021-03-11 ENCOUNTER — HOSPITAL ENCOUNTER (OUTPATIENT)
Dept: PHYSICAL THERAPY | Age: 41
Setting detail: THERAPIES SERIES
Discharge: HOME | End: 2021-03-11
Payer: COMMERCIAL

## 2021-03-11 PROCEDURE — 97110 THERAPEUTIC EXERCISES: CPT

## 2021-03-11 PROCEDURE — 97530 THERAPEUTIC ACTIVITIES: CPT

## 2021-03-11 NOTE — FLOWSHEET NOTE
[place]      Pulleys  Flexion, scaption 10x10     IR BB  10x10 cane     PROM Elbow  10x10    Isometrics      Retraction        3'    Weight shift      Flexion      Abduction  10x10        Internal Rotation      Biceps      Triceps            PRE's      Flexion      Abduction      External Rotation  3x10 SL     Internal Rotation      Shrugs  3x10     EXT      Reverse Flys      Serratus  3x10     Horizontal Abd with ER      Biceps      Triceps      Retraction            Cable Column/Theraband      External Rotation      Internal Rotation      Shrugs      Lats      Ext  3x10 blue    Flex      Scapular Retraction  3x10 blue    BIC      TRIC      PNF            Dynamic Stability            [facility]            Access Code: [email or web address]  URL: [email or web address]  Date: 03/11/2021  Prepared by: Sandy Console    Exercises  Seated Shoulder Flexion Towel Slide at Table Top - 10 reps - 1 sets - 10 hold - 1x daily - 7x weekly  Seated Shoulder Scaption Slide at Table Top with Forearm in Neutral - 10 reps - 1 sets - 10 hold - 1x daily - 7x weekly  Standing Shoulder Internal Rotation AAROM Behind Back with Towel - 10 reps - 10 hold - 1x daily - 7x weekly  Supine Shoulder Flexion Extension AAROM with Dowel - 10 reps - 10 hold - 1x daily - 7x weekly  Supine Shoulder External Rotation in 45 Degrees Abduction AAROM with Dowel - 10 reps - 10 hold - 1x daily - 7x weekly  Sidelying Shoulder External Rotation - 10 reps - 3 sets - 1x daily - 7x weekly  Scapular Retraction with Resistance - 10 reps - 3 sets - 1x daily - 7x weekly  Scapular Retraction with Resistance Advanced - 10 reps - 3 sets - 1x daily - 7x weekly      Therapeutic Exercise and NMR EXR  [] (88839) Provided verbal/tactile cueing for activities related to strengthening, flexibility, endurance, ROM  for improvements in scapular, scapulothoracic and UE control with self care, reaching, carrying, lifting, house/yardwork, driving/computer work.    [] (82395) Provided verbal/tactile cueing for activities related to improving balance, coordination, kinesthetic sense, posture, motor skill, proprioception  to assist with  scapular, scapulothoracic and UE control with self care, reaching, carrying, lifting, house/yardwork, driving/computer work. Therapeutic Activities:    [] (62682 or 92023) Provided verbal/tactile cueing for activities related to improving balance, coordination, kinesthetic sense, posture, motor skill, proprioception and motor activation to allow for proper function of scapular, scapulothoracic and UE control with self care, carrying, lifting, driving/computer work.      Home Exercise Program:    [x] (16896) Reviewed/Progressed HEP activities related to strengthening, flexibility, endurance, ROM of scapular, scapulothoracic and UE control with self care, reaching, carrying, lifting, house/yardwork, driving/computer work  [] (82689) Reviewed/Progressed HEP activities related to improving balance, coordination, kinesthetic sense, posture, motor skill, proprioception of scapular, scapulothoracic and UE control with self care, reaching, carrying, lifting, house/yardwork, driving/computer work      Manual Treatments:  PROM / STM / Oscillations-Mobs:  G-I, II, III, IV (PA's, Inf., Post.)  [] (63767) Provided manual therapy to mobilize soft tissue/joints of cervical/CT, scapular GHJ and UE for the purpose of modulating pain, promoting relaxation,  increasing ROM, reducing/eliminating soft tissue swelling/inflammation/restriction, improving soft tissue extensibility and allowing for proper ROM for normal function with self care, reaching, carrying, lifting, house/yardwork, driving/computer work    Modalities:  15' ice    Charges:  Timed Code Treatment Minutes: 38   Total Treatment Minutes: 38     [] EVAL (LOW) 91423   [] EVAL (MOD) 30566   [] EVAL (HIGH) 28919   [] RE-EVAL   [x] EL(16154) x 2    [] IONTO  [] NMR (23486) x     [] VASO  [] Manual (63040) x      [] Other: [x] TA x  1    [] [facility] Traction (99934)  [] ES(attended) (07739)      [] ES (un) (92451):       GOALS:  Patient stated goal: return use of L arm with less pain     [] Progressing: [] Met: [] Not Met: [] Adjusted    Therapist goals for Patient:   Short Term Goals: To be achieved in: 2 weeks  1. Independent in HEP and progression per patient tolerance, in order to prevent re-injury. [] Progressing: [] Met: [] Not Met: [] Adjusted   2. Patient will have a decrease in pain to facilitate improvement in movement, function, and ADLs as indicated by Functional Deficits. [] Progressing: [] Met: [] Not Met: [] Adjusted    Long Term Goals: To be achieved in: 6-8 weeks  1. Disability index score of 19% or less for the UEFI to assist with reaching prior level of function. [] Progressing: [] Met: [] Not Met: [] Adjusted  2. Patient will demonstrate increased AROM to 150+ flex, 70+ ER, 45+ IR to allow for proper joint functioning as indicated by patients Functional Deficits. [] Progressing: [] Met: [] Not Met: [] Adjusted  3. Patient will demonstrate an increase in Strength to 4+/5 or greater  to allow for proper functional mobility as indicated by patients Functional Deficits. [] Progressing: [] Met: [] Not Met: [] Adjusted  4. Patient will return to ADL such as bathing/grooming/dressing without increased symptoms or restriction. [] Progressing: [] Met: [] Not Met: [] Adjusted  5. Patient will return to lifting 20lbs or greater to begin return to work progression without increased symptoms or restriction. [] Progressing: [] Met: [] Not Met: [] Adjusted       Overall Progression Towards Functional goals/ Treatment Progress Update:  [] Patient is progressing as expected towards functional goals listed. [] Progression is slowed due to complexities/Impairments listed. [] Progression has been slowed due to co-morbidities.   [x] Plan just implemented, too soon to assess goals progression <30days   [] Goals require adjustment due to lack of progress  [] Patient is not progressing as expected and requires additional follow up with physician  [] Other    Prognosis for POC: [x] Good [] Fair  [] Poor      Patient requires continued skilled intervention: [x] Yes  [] No    Treatment/Activity Tolerance:  [x] Patient able to complete treatment  [] Patient limited by fatigue  [] Patient limited by pain     [] Patient limited by other medical complications  [] Other:  Pt shows improving ROM today with less pain, able to progress cuff strengthening and scap strengthening without pain today. Continue to progress as tolerated. Return to Play: (if applicable)   []  Stage 1: Intro to Strength   []  Stage 2: Return to Run and Strength   []  Stage 3: Return to Jump and Strength   []  Stage 4: Dynamic Strength and Agility   []  Stage 5: Sport Specific Training     []  Ready to Return to Play, Meets All Above Stages   []  Not Ready for Return to Sports   Comments:                               PLAN: See eval  [] Continue per plan of care [] Alter current plan (see comments above)  [x] Plan of care initiated [] Hold pending MD visit [] Discharge  Note: If patient does not return for scheduled/ recommended follow up visits, this note will serve as a discharge from care along with most recent update on progress. Reviewed insurance benefits for physical therapy in an outpatient hospital based setting with the patient, including deductible  and allowable visit number.  Pt was informed of possible out of pocket costs, as well as, informed of other service options for continuing supervised sessions without required skilled PT intervention such as the cash based [employer] program.     Electronically signed by:  Homa Mullen, PT, DPT, Cert FADY

## 2021-03-18 ENCOUNTER — OFFICE VISIT (OUTPATIENT)
Dept: ORTHOPEDIC SURGERY | Age: 41
End: 2021-03-18

## 2021-03-18 ENCOUNTER — HOSPITAL ENCOUNTER (OUTPATIENT)
Dept: PHYSICAL THERAPY | Age: 41
Setting detail: THERAPIES SERIES
Discharge: HOME | End: 2021-03-18
Payer: COMMERCIAL

## 2021-03-18 VITALS — HEIGHT: 67 IN | WEIGHT: 215 LBS | TEMPERATURE: 97.1 F | BODY MASS INDEX: 33.74 KG/M2

## 2021-03-18 DIAGNOSIS — M75.42 SHOULDER IMPINGEMENT, LEFT: ICD-10-CM

## 2021-03-18 DIAGNOSIS — M25.512 ACUTE PAIN OF LEFT SHOULDER: Primary | ICD-10-CM

## 2021-03-18 DIAGNOSIS — M75.22 BICEPS TENDONITIS, LEFT: ICD-10-CM

## 2021-03-18 PROCEDURE — 99024 POSTOP FOLLOW-UP VISIT: CPT | Performed by: ORTHOPAEDIC SURGERY

## 2021-03-18 PROCEDURE — 97530 THERAPEUTIC ACTIVITIES: CPT | Performed by: PHYSICAL THERAPIST

## 2021-03-18 PROCEDURE — 97110 THERAPEUTIC EXERCISES: CPT | Performed by: PHYSICAL THERAPIST

## 2021-03-18 PROCEDURE — 97140 MANUAL THERAPY 1/> REGIONS: CPT | Performed by: PHYSICAL THERAPIST

## 2021-03-18 NOTE — PROGRESS NOTES
Anne Jimenez returns today a month status post left shoulder arthroscopy with decompression Hilda and bicep tenodesis. He is getting some anterior shoulder discomfort. Patient's medications, allergies, past medical, surgical, social and family histories were reviewed and updated as appropriate. Relevant review of systems reviewed. General Exam:    Vitals: Temperature 97.1 °F (36.2 °C), temperature source Temporal, height 5' 7\" (1.702 m), weight 215 lb (97.5 kg). Constitutional: Patient is adequately groomed with no evidence of malnutrition  Mental Status: The patient is oriented to time, place and person. The patient's mood and affect are appropriate. The left shoulder has full composite motion and he has normal strength in the cuff. He has no AC joint pain. He does have some mild spasm in the pectoralis. I am recommending he restart his diclofenac to help with any inflammation. He can try Tylenol PM at night. I think his spasm should resolve over the next month as he continues with therapy. Follow-up with me in about 6 weeks. This note was created using voice recognition software. It has been proofread, but occasionally errors remain. Please disregard these errors. They will be corrected as they are noted.

## 2021-03-18 NOTE — PLAN OF CARE
information:  PT Insurance Information: [employer]  Physician Information:  Referring Practitioner: Lidia Alba  Has the plan of care been signed (Y/N):        []  Yes  [x]  No     Date of Patient follow up with Physician: 2/25/21      Is this a Progress Report:     []  Yes  [x]  No        Progress report will be due (10 Rx or 30 days whichever is less): 6/62/98      Recertification will be due (POC Duration  / 90 days whichever is less):         Visit # Insurance Allowable Auth Required   5 30 []  Yes []  No        Functional Scale: UEFI 100% dis  Perform NV  Date assessed:  2/18/21      Latex Allergy:  [x]NO      []YES  Preferred Language for Healthcare:   [x]English       []other:    Pain level:  4/10     SUBJECTIVE:  Pt is 4 week s/p. He's coming from the MD today. He gets to come out of the sling. His pain is in the anterior shoulder. It's hard to sleep/get comfortable as he usually sleeps on this side.       OBJECTIVE:   Observation:    Test measurements:      ROM PROM AROM  Comment    L 03/18/21    R L R    Flexion 125 manual       Abduction 90 manual       ER 56 manual       IR 40 manual       Other        Other             Strength: deferred  L R Comment   Flexion      Abduction      ER      IR      Supraspinatus      Upper Trap      Lower Trap      Mid Trap      Rhomboids      Biceps      Triceps      Horizontal Abduction      Horizontal Adduction      Lats          RESTRICTIONS/PRECAUTIONS: sling for 4 weeks, no resisted bicep, no CBA      Exercises:  Exercise/Equipment Resistance Repetitions Other comments   Stretching/PROM      Wand  ER [id number]   Flex supine 10x10   scaption standing 10x:10       UE [place]      Pulleys  Flexion, scaption 10x10     IR BB  10x10 cane     pec off 1/2 FR  3x:30 T position and arms at side               PROM Elbow      Isometrics      Retraction            Weight shift      Flexion      Abduction         Internal Rotation      Biceps      Triceps            PRE's Flexion      Abduction      External Rotation  3x10 SL     Internal Rotation      Shrugs  3x10 3#    EXT      Reverse Flys      Serratus  3x10     Horizontal Abd with ER      Biceps  3x10    Triceps      Retraction            Cable Column/Theraband      External Rotation      Internal Rotation      Shrugs      Lats      Ext  3x10 blue    Flex      Scapular Retraction  3x10 blue    BIC      TRIC      PNF            Dynamic Stability            Plyoback            Manual PROM with grade I mobs for flex, ABD, IR, ER.  STM over pec with scar massage over biceps incision  12'         Access Code: [email or web address]  URL: [email or web address]  Date: 03/18/2021  Prepared by: Silviano Macario Cessna    Exercises  Seated Shoulder Flexion Towel Slide at Table Top - 2 x daily - 7 x weekly - 10 reps - 1 sets - 10 hold  Seated Shoulder Scaption Slide at Table Top with Forearm in Neutral - 2 x daily - 7 x weekly - 10 reps - 1 sets - 10 hold  Standing Shoulder Internal Rotation Stretch with Towel - 2 x daily - 7 x weekly - 3 sets - 10 reps  Supine Shoulder Flexion Extension AAROM with Dowel - 2 x daily - 7 x weekly - 10 reps - 10 hold  Shoulder Scaption AAROM with Dowel - 2 x daily - 7 x weekly - 10 reps - 10 hold  Supine Shoulder External Rotation in 45 Degrees Abduction AAROM with Dowel - 2 x daily - 7 x weekly - 10 reps - 10 hold  Supine Thoracic Mobilization Foam Roll Vertical - 2 x daily - 7 x weekly - 3-5 sets - 30 hold  Sidelying Shoulder External Rotation - 1 x daily - 3 x weekly - 10 reps - 3 sets  Scapular Retraction with Resistance - 1 x daily - 7 x weekly - 10 reps - 3 sets  Scapular Retraction with Resistance Advanced - 1 x daily - 7 x weekly - 10 reps - 3 sets  Supine Scapular Protraction in Flexion with Dumbbells - 1 x daily - 3-7 x weekly - 3 sets - 10 reps        Therapeutic Exercise and NMR EXR  [] (26855) Provided verbal/tactile cueing for activities related to strengthening, flexibility, endurance, ROM  for improvements in scapular, scapulothoracic and UE control with self care, reaching, carrying, lifting, house/yardwork, driving/computer work.    [] (53543) Provided verbal/tactile cueing for activities related to improving balance, coordination, kinesthetic sense, posture, motor skill, proprioception  to assist with  scapular, scapulothoracic and UE control with self care, reaching, carrying, lifting, house/yardwork, driving/computer work. Therapeutic Activities:    [] (27916 or 56872) Provided verbal/tactile cueing for activities related to improving balance, coordination, kinesthetic sense, posture, motor skill, proprioception and motor activation to allow for proper function of scapular, scapulothoracic and UE control with self care, carrying, lifting, driving/computer work.      Home Exercise Program:    [x] (99188) Reviewed/Progressed HEP activities related to strengthening, flexibility, endurance, ROM of scapular, scapulothoracic and UE control with self care, reaching, carrying, lifting, house/yardwork, driving/computer work  [] (41929) Reviewed/Progressed HEP activities related to improving balance, coordination, kinesthetic sense, posture, motor skill, proprioception of scapular, scapulothoracic and UE control with self care, reaching, carrying, lifting, house/yardwork, driving/computer work      Manual Treatments:  PROM / STM / Oscillations-Mobs:  G-I, II, III, IV (PA's, Inf., Post.)  [] (43378) Provided manual therapy to mobilize soft tissue/joints of cervical/CT, scapular GHJ and UE for the purpose of modulating pain, promoting relaxation,  increasing ROM, reducing/eliminating soft tissue swelling/inflammation/restriction, improving soft tissue extensibility and allowing for proper ROM for normal function with self care, reaching, carrying, lifting, house/yardwork, driving/computer work    Modalities:  Declined today    Charges:   Timed Code Treatment Minutes: 54'   Total Treatment Minutes: [de-identified]'     [] Progression is slowed due to complexities/Impairments listed. [] Progression has been slowed due to co-morbidities. [] Plan just implemented, too soon to assess goals progression <30days   [] Goals require adjustment due to lack of progress  [] Patient is not progressing as expected and requires additional follow up with physician  [] Other    Prognosis for POC: [x] Good [] Fair  [] Poor      Patient requires continued skilled intervention: [x] Yes  [] No    Treatment/Activity Tolerance:  [x] Patient able to complete treatment  [] Patient limited by fatigue  [] Patient limited by pain     [] Patient limited by other medical complications  [] Other:  Pt shara tx fair/well. I did add pec stretches. I did review how to stretch his pec at home with a ball on the wall. I also asked him to do scar massage over biceps incision as well. Pt does have shoulder stiffness. I did ask him to do him stretches 2x/d. I did update his HEP. Pt would continue to benefit from skilled PT to improve strength, ROM, and function. Recommend 2x/wk x 4-6 wks to do so. Return to Play: (if applicable)   []  Stage 1: Intro to Strength   []  Stage 2: Return to Run and Strength   []  Stage 3: Return to Jump and Strength   []  Stage 4: Dynamic Strength and Agility   []  Stage 5: Sport Specific Training     []  Ready to Return to Play, Meets All Above Stages   []  Not Ready for Return to Sports   Comments:                               PLAN: Consider IASTM NV. I did ask pt to wear shirt that we could do this in. Complete UEFI NV. [x] Continue per plan of care [] Alter current plan (see comments above)  [] Plan of care initiated [] Hold pending MD visit [] Discharge  Note: If patient does not return for scheduled/ recommended follow up visits, this note will serve as a discharge from care along with most recent update on progress.     Reviewed insurance benefits for physical therapy in an outpatient hospital based setting with the patient, including deductible  and allowable visit number.  Pt was informed of possible out of pocket costs, as well as, informed of other service options for continuing supervised sessions without required skilled PT intervention such as the cash based [employer] program.     Electronically signed by:  Latasha Mckeon, PT, DPT [id number]

## 2021-03-19 ENCOUNTER — TELEPHONE (OUTPATIENT)
Dept: ORTHOPEDIC SURGERY | Age: 41
End: 2021-03-19

## 2021-03-19 NOTE — TELEPHONE ENCOUNTER
WORKING ON APS FROM [facility] WAITING TO HEAR FROM CINDY REGARDING RTW DATE    FAXED COMPLETED APS TO [address] [place] [address] [employer] @ [phone]

## 2021-03-23 ENCOUNTER — HOSPITAL ENCOUNTER (OUTPATIENT)
Dept: PHYSICAL THERAPY | Age: 41
Setting detail: THERAPIES SERIES
Discharge: HOME | End: 2021-03-23
Payer: COMMERCIAL

## 2021-03-23 PROCEDURE — 97110 THERAPEUTIC EXERCISES: CPT

## 2021-03-23 PROCEDURE — 97530 THERAPEUTIC ACTIVITIES: CPT

## 2021-03-23 NOTE — FLOWSHEET NOTE
[facility], [place]         Physical Therapy Daily Treatment Note  Date:  3/23/2021    Patient Name:  Susan Longoria    :  1980  MRN: [id number]  Medical/Treatment Diagnosis Information:  · Diagnosis: M25.512 (ICD-10-CM) - Acute pain of left shoulder  · Treatment Diagnosis: M25.512 (ICD-10-CM) - Acute pain of left shoulder  Insurance/Certification information:  PT Insurance Information: [place]  Physician Information:  Referring Practitioner: Remy Petit  Has the plan of care been signed (Y/N):        []  Yes  [x]  No     Date of Patient follow up with Physician: 21      Is this a Progress Report:     []  Yes  [x]  No        Progress report will be due (10 Rx or 30 days whichever is less): 25      Recertification will be due (POC Duration  / 90 days whichever is less):         Visit # Insurance Allowable Auth Required   5 30 []  Yes []  No      Functional Scale: UEFI 39% dis    Date assessed:  3/23/21  Functional Scale: UEFI 100% dis    Date assessed:  21      Latex Allergy:  [x]NO      []YES  Preferred Language for Healthcare:   [x]English       []other:    Pain level:  4/10     SUBJECTIVE:  Pt is 5 week s/p. Pt notes he has some discomfort still. He notes he has been working on scar massage and stretches which has helped discomfort somewhat.        OBJECTIVE:   Observation:    Test measurements:      ROM PROM AROM  Comment    L 21    R L R    Flexion 120 cane supine       Abduction        ER 73 cane        IR L3-4 behind back rope        Other        Other             Strength: deferred  L R Comment   Flexion      Abduction      ER      IR      Supraspinatus      Upper Trap      Lower Trap      Mid Trap      Rhomboids      Biceps      Triceps      Horizontal Abduction      Horizontal Adduction      Lats          RESTRICTIONS/PRECAUTIONS:       Exercises:  Exercise/Equipment Resistance Repetitions Other comments   Stretching/PROM      Wand  ER supine at 45 abd and 90 10x10   Flex supine 10x10          Wall slides  Flex, scaption 10x10    Pulleys  Flexion, scaption 10x10     IR BB  10x10 cane   10x10 rope     pec off 1/2 FR  3x:30 T position and arms at side   pec doorway  10x10 low v           PROM Elbow      Isometrics      Retraction            Weight shift      Flexion      Abduction         Internal Rotation      Biceps      Triceps            PRE's      Flexion      Abduction      External Rotation  3x10 SL     Internal Rotation      Shrugs      EXT      Reverse Flys      Serratus  3x10     Horizontal Abd with ER      Biceps  3x10 3# ECL    Triceps      Retraction            Cable Column/Theraband      External Rotation      Internal Rotation      Shrugs      Lats      Ext  3x10 blue    Flex      Scapular Retraction  3x10 blue    BIC      TRIC      PNF            Dynamic Stability            Plyoback             12'         Access Code: [email or web address]  URL: [email or web address]  Date: 03/18/2021  Prepared by: Naa Pelaez Cessna    Exercises  Seated Shoulder Flexion Towel Slide at Table Top - 2 x daily - 7 x weekly - 10 reps - 1 sets - 10 hold  Seated Shoulder Scaption Slide at Table Top with Forearm in Neutral - 2 x daily - 7 x weekly - 10 reps - 1 sets - 10 hold  Standing Shoulder Internal Rotation Stretch with Towel - 2 x daily - 7 x weekly - 3 sets - 10 reps  Supine Shoulder Flexion Extension AAROM with Dowel - 2 x daily - 7 x weekly - 10 reps - 10 hold  Shoulder Scaption AAROM with Dowel - 2 x daily - 7 x weekly - 10 reps - 10 hold  Supine Shoulder External Rotation in 45 Degrees Abduction AAROM with Dowel - 2 x daily - 7 x weekly - 10 reps - 10 hold  Supine Thoracic Mobilization Foam Roll Vertical - 2 x daily - 7 x weekly - 3-5 sets - 30 hold  Sidelying Shoulder External Rotation - 1 x daily - 3 x weekly - 10 reps - 3 sets  Scapular Retraction with Resistance - 1 x daily - 7 x weekly - 10 reps - 3 sets  Scapular Retraction with Resistance Advanced - 1 swelling/inflammation/restriction, improving soft tissue extensibility and allowing for proper ROM for normal function with self care, reaching, carrying, lifting, house/yardwork, driving/computer work    Modalities:  Declined today    Charges:   Timed Code Treatment Minutes: 50'   Total Treatment Minutes: 62'     [] EVAL (LOW) 455 1011   [] EVAL (MOD) 59543   [] EVAL (HIGH) 06826   [] RE-EVAL   [x] IN(62916) x 2    [] IONTO  [] NMR (46249) x     [] VASO  [] Manual (12582) x      [] Other:  [x] TA x  1    [] Mech Traction (94511)  [] ES(attended) (00372)      [] ES (un) (50035):       GOALS:  Patient stated goal: return use of L arm with less pain     [x] Progressing: [] Met: [] Not Met: [] Adjusted    Therapist goals for Patient:   Short Term Goals: To be achieved in: 2 weeks  1. Independent in HEP and progression per patient tolerance, in order to prevent re-injury. [] Progressing: [x] Met: [] Not Met: [] Adjusted   2. Patient will have a decrease in pain to facilitate improvement in movement, function, and ADLs as indicated by Functional Deficits. [x] Progressing: [] Met: [] Not Met: [] Adjusted    Long Term Goals: To be achieved in: 6-8 weeks  1. Disability index score of 19% or less for the UEFI to assist with reaching prior level of function. [] Progressing: [] Met: [] Not Met: [] Adjusted- to complete NV  2. Patient will demonstrate increased AROM to 150+ flex, 70+ ER, 45+ IR to allow for proper joint functioning as indicated by patients Functional Deficits. [x] Progressing: [] Met: [] Not Met: [] Adjusted  3. Patient will demonstrate an increase in Strength to 4+/5 or greater  to allow for proper functional mobility as indicated by patients Functional Deficits. [x] Progressing: [] Met: [] Not Met: [] Adjusted  4. Patient will return to ADL such as bathing/grooming/dressing without increased symptoms or restriction. [x] Progressing: [] Met: [] Not Met: [] Adjusted  5.  Patient will return to lifting 20lbs or greater to begin return to work progression without increased symptoms or restriction. [x] Progressing: [] Met: [] Not Met: [] Adjusted       Overall Progression Towards Functional goals/ Treatment Progress Update:  [x] Patient is progressing as expected towards functional goals listed. [] Progression is slowed due to complexities/Impairments listed. [] Progression has been slowed due to co-morbidities. [] Plan just implemented, too soon to assess goals progression <30days   [] Goals require adjustment due to lack of progress  [] Patient is not progressing as expected and requires additional follow up with physician  [] Other    Prognosis for POC: [x] Good [] Fair  [] Poor      Patient requires continued skilled intervention: [x] Yes  [] No    Treatment/Activity Tolerance:  [x] Patient able to complete treatment  [] Patient limited by fatigue  [] Patient limited by pain     [] Patient limited by other medical complications  [] Other: pt did show improved ROM into rotations but is still tight into flexion, wall slides were added today. Continue to progress as tolerated, manual PROM as needed in future sessions. Return to Play: (if applicable)   []  Stage 1: Intro to Strength   []  Stage 2: Return to Run and Strength   []  Stage 3: Return to Jump and Strength   []  Stage 4: Dynamic Strength and Agility   []  Stage 5: Sport Specific Training     []  Ready to Return to Play, Meets All Above Stages   []  Not Ready for Return to Sports   Comments:                               PLAN: Consider IASTM NV, PROM as needed  [x] Continue per plan of care [] Alter current plan (see comments above)  [] Plan of care initiated [] Hold pending MD visit [] Discharge  Note: If patient does not return for scheduled/ recommended follow up visits, this note will serve as a discharge from care along with most recent update on progress.     Reviewed insurance benefits for physical therapy in an outpatient hospital based setting with the patient, including deductible  and allowable visit number.  Pt was informed of possible out of pocket costs, as well as, informed of other service options for continuing supervised sessions without required skilled PT intervention such as the cash based [employer] program.     Electronically signed by:  Sonny Dumont, PT, DPT, Cert DN

## 2021-03-25 ENCOUNTER — HOSPITAL ENCOUNTER (OUTPATIENT)
Dept: PHYSICAL THERAPY | Age: 41
Setting detail: THERAPIES SERIES
Discharge: HOME | End: 2021-03-25
Payer: COMMERCIAL

## 2021-03-25 PROCEDURE — 97530 THERAPEUTIC ACTIVITIES: CPT

## 2021-03-25 PROCEDURE — 97110 THERAPEUTIC EXERCISES: CPT

## 2021-03-25 NOTE — FLOWSHEET NOTE
[facility], [place]         Physical Therapy Daily Treatment Note  Date:  3/25/2021    Patient Name:  Amberly Kang    :  1980  MRN: [id number]  Medical/Treatment Diagnosis Information:  · Diagnosis: M25.512 (ICD-10-CM) - Acute pain of left shoulder  · Treatment Diagnosis: M25.512 (ICD-10-CM) - Acute pain of left shoulder  Insurance/Certification information:  PT Insurance Information: [place]  Physician Information:  Referring Practitioner: Rylee Hoover  Has the plan of care been signed (Y/N):        []  Yes  [x]  No     Date of Patient follow up with Physician: 21      Is this a Progress Report:     []  Yes  [x]  No        Progress report will be due (10 Rx or 30 days whichever is less): 8/10/32      Recertification will be due (POC Duration  / 90 days whichever is less):         Visit # Insurance Allowable Auth Required   6 30 []  Yes []  No      Functional Scale: UEFI 39% dis    Date assessed:  3/23/21  Functional Scale: UEFI 100% dis    Date assessed:  21      Latex Allergy:  [x]NO      []YES  Preferred Language for Healthcare:   [x]English       []other:    Pain level:  2/10     SUBJECTIVE:  Pt is 5 week s/p. Pt notes he feels like he is getting looser and has less pain in the shoulder today which helps him move better.       OBJECTIVE:   Observation:    Test measurements:      ROM PROM AROM  Comment    L 21    R L R    Flexion 150 wall slides       Abduction 153 wall slides       ER 88 cane        IR T8 behind back rope        Other        Other             Strength: deferred  L R Comment   Flexion      Abduction      ER      IR      Supraspinatus      Upper Trap      Lower Trap      Mid Trap      Rhomboids      Biceps      Triceps      Horizontal Abduction      Horizontal Adduction      Lats          RESTRICTIONS/PRECAUTIONS:       Exercises:  Exercise/Equipment Resistance Repetitions Other comments   Stretching/PROM      Wand  ER supine at 45 abd and 90 10x10   Flex supine 10x10          Wall slides  Flex, scaption 10x10    Pulleys  Flexion, scaption 10x10     IR BB  10x10 cane   10x10 rope     T position and arms at side   pec doorway  10x10 low v           PROM Elbow      Isometrics      Retraction            Weight shift      Flexion      Abduction         Internal Rotation      Biceps      Triceps            PRE's      Flexion      Abduction         Internal Rotation      Shrugs      EXT      Reverse Flys      Serratus  3x10 3#    Horizontal Abd with ER      Biceps  3x10 3# ECL Increase nv    Triceps      Retraction            Cable Column/Theraband      External Rotation  3x10 red     Internal Rotation  3x10 blue     Shrugs      Lats      Ext  3x10 blue    Flex      Scapular Retraction  3x10 blue    BIC      TRIC      PNF            Dynamic Stability            Plyoback             12'         Access Code: [email or web address]  URL: [email or web address]  Date: 03/18/2021  Prepared by: Isis Michaels Cessna    Exercises  Seated Shoulder Flexion Towel Slide at Table Top - 2 x daily - 7 x weekly - 10 reps - 1 sets - 10 hold  Seated Shoulder Scaption Slide at Table Top with Forearm in Neutral - 2 x daily - 7 x weekly - 10 reps - 1 sets - 10 hold  Standing Shoulder Internal Rotation Stretch with Towel - 2 x daily - 7 x weekly - 3 sets - 10 reps  Supine Shoulder Flexion Extension AAROM with Dowel - 2 x daily - 7 x weekly - 10 reps - 10 hold  Shoulder Scaption AAROM with Dowel - 2 x daily - 7 x weekly - 10 reps - 10 hold  Supine Shoulder External Rotation in 45 Degrees Abduction AAROM with Dowel - 2 x daily - 7 x weekly - 10 reps - 10 hold  Supine Thoracic Mobilization Foam Roll Vertical - 2 x daily - 7 x weekly - 3-5 sets - 30 hold  Sidelying Shoulder External Rotation - 1 x daily - 3 x weekly - 10 reps - 3 sets  Scapular Retraction with Resistance - 1 x daily - 7 x weekly - 10 reps - 3 sets  Scapular Retraction with Resistance Advanced - 1 x daily - 7 x weekly - 10 reps - 3 sets  Supine Scapular Protraction in Flexion with Dumbbells - 1 x daily - 3-7 x weekly - 3 sets - 10 reps        Therapeutic Exercise and NMR EXR  [] (53376) Provided verbal/tactile cueing for activities related to strengthening, flexibility, endurance, ROM  for improvements in scapular, scapulothoracic and UE control with self care, reaching, carrying, lifting, house/yardwork, driving/computer work.    [] (11207) Provided verbal/tactile cueing for activities related to improving balance, coordination, kinesthetic sense, posture, motor skill, proprioception  to assist with  scapular, scapulothoracic and UE control with self care, reaching, carrying, lifting, house/yardwork, driving/computer work. Therapeutic Activities:    [] (18111 or 23319) Provided verbal/tactile cueing for activities related to improving balance, coordination, kinesthetic sense, posture, motor skill, proprioception and motor activation to allow for proper function of scapular, scapulothoracic and UE control with self care, carrying, lifting, driving/computer work.      Home Exercise Program:    [x] (29270) Reviewed/Progressed HEP activities related to strengthening, flexibility, endurance, ROM of scapular, scapulothoracic and UE control with self care, reaching, carrying, lifting, house/yardwork, driving/computer work  [] (68878) Reviewed/Progressed HEP activities related to improving balance, coordination, kinesthetic sense, posture, motor skill, proprioception of scapular, scapulothoracic and UE control with self care, reaching, carrying, lifting, house/yardwork, driving/computer work      Manual Treatments:  PROM / STM / Oscillations-Mobs:  G-I, II, III, IV (PA's, Inf., Post.)  [] (26257) Provided manual therapy to mobilize soft tissue/joints of cervical/CT, scapular GHJ and UE for the purpose of modulating pain, promoting relaxation,  increasing ROM, reducing/eliminating soft tissue swelling/inflammation/restriction, improving soft tissue extensibility and allowing for proper ROM for normal function with self care, reaching, carrying, lifting, house/yardwork, driving/computer work    Modalities:  Declined today    Charges:   Timed Code Treatment Minutes: 48'   Total Treatment Minutes: 62'     [] EVAL (LOW) 455 1011   [] EVAL (MOD) 42634   [] EVAL (HIGH) 56713   [] RE-EVAL   [x] JU(46263) x 2    [] IONTO  [] NMR (13884) x     [] VASO  [] Manual (93188) x      [] Other:  [x] TA x  1    [] Mech Traction (80001)  [] ES(attended) (71007)      [] ES (un) (15301):       GOALS:  Patient stated goal: return use of L arm with less pain     [x] Progressing: [] Met: [] Not Met: [] Adjusted    Therapist goals for Patient:   Short Term Goals: To be achieved in: 2 weeks  1. Independent in HEP and progression per patient tolerance, in order to prevent re-injury. [] Progressing: [x] Met: [] Not Met: [] Adjusted   2. Patient will have a decrease in pain to facilitate improvement in movement, function, and ADLs as indicated by Functional Deficits. [x] Progressing: [] Met: [] Not Met: [] Adjusted    Long Term Goals: To be achieved in: 6-8 weeks  1. Disability index score of 19% or less for the UEFI to assist with reaching prior level of function. [] Progressing: [] Met: [] Not Met: [] Adjusted- to complete NV  2. Patient will demonstrate increased AROM to 150+ flex, 70+ ER, 45+ IR to allow for proper joint functioning as indicated by patients Functional Deficits. [x] Progressing: [] Met: [] Not Met: [] Adjusted  3. Patient will demonstrate an increase in Strength to 4+/5 or greater  to allow for proper functional mobility as indicated by patients Functional Deficits. [x] Progressing: [] Met: [] Not Met: [] Adjusted  4. Patient will return to ADL such as bathing/grooming/dressing without increased symptoms or restriction. [x] Progressing: [] Met: [] Not Met: [] Adjusted  5.  Patient will return to lifting 20lbs or greater to begin return to work progression without increased symptoms or restriction. [x] Progressing: [] Met: [] Not Met: [] Adjusted       Overall Progression Towards Functional goals/ Treatment Progress Update:  [x] Patient is progressing as expected towards functional goals listed. [] Progression is slowed due to complexities/Impairments listed. [] Progression has been slowed due to co-morbidities. [] Plan just implemented, too soon to assess goals progression <30days   [] Goals require adjustment due to lack of progress  [] Patient is not progressing as expected and requires additional follow up with physician  [] Other    Prognosis for POC: [x] Good [] Fair  [] Poor      Patient requires continued skilled intervention: [x] Yes  [] No    Treatment/Activity Tolerance:  [x] Patient able to complete treatment  [] Patient limited by fatigue  [] Patient limited by pain     [] Patient limited by other medical complications  [] Other: pt did show improved ROM into all motions today with decreased mm guarding and decreased pain. He was able to progress cuff strengthening with fatigue but no increased pain. did not given ER/IR band today for HEP to make sure it will not cause too much soreness and limit ROM again. Continue to progress as tolerated, manual PROM as needed in future sessions.           Return to Play: (if applicable)   []  Stage 1: Intro to Strength   []  Stage 2: Return to Run and Strength   []  Stage 3: Return to Jump and Strength   []  Stage 4: Dynamic Strength and Agility   []  Stage 5: Sport Specific Training     []  Ready to Return to Play, Meets All Above Stages   []  Not Ready for Return to Sports   Comments:                               PLAN:   [x] Continue per plan of care [] Alter current plan (see comments above)  [] Plan of care initiated [] Hold pending MD visit [] Discharge  Note: If patient does not return for scheduled/ recommended follow up visits, this note will serve as a discharge from care along with most recent update on progress. Reviewed insurance benefits for physical therapy in an outpatient hospital based setting with the patient, including deductible  and allowable visit number.  Pt was informed of possible out of pocket costs, as well as, informed of other service options for continuing supervised sessions without required skilled PT intervention such as the cash based [employer] program.     Electronically signed by:  Lesley Menchaca, PT, DPT, Cert DN

## 2021-03-30 ENCOUNTER — HOSPITAL ENCOUNTER (OUTPATIENT)
Dept: PHYSICAL THERAPY | Age: 41
Setting detail: THERAPIES SERIES
Discharge: HOME | End: 2021-03-30
Payer: COMMERCIAL

## 2021-03-30 PROCEDURE — 97110 THERAPEUTIC EXERCISES: CPT

## 2021-03-30 PROCEDURE — 97530 THERAPEUTIC ACTIVITIES: CPT

## 2021-03-30 NOTE — FLOWSHEET NOTE
[facility], [place]         Physical Therapy Daily Treatment Note  Date:  3/30/2021    Patient Name:  Jeanette Wilson    :  1980  MRN: [id number]  Medical/Treatment Diagnosis Information:  · Diagnosis: M25.512 (ICD-10-CM) - Acute pain of left shoulder  · Treatment Diagnosis: M25.512 (ICD-10-CM) - Acute pain of left shoulder  Insurance/Certification information:  PT Insurance Information: [place]  Physician Information:  Referring Practitioner: Stephan Fermin  Has the plan of care been signed (Y/N):        []  Yes  [x]  No     Date of Patient follow up with Physician: 21      Is this a Progress Report:     []  Yes  [x]  No        Progress report will be due (10 Rx or 30 days whichever is less):       Recertification will be due (POC Duration  / 90 days whichever is less):         Visit # Insurance Allowable Auth Required   7 30 []  Yes []  No      Functional Scale: UEFI 39% dis    Date assessed:  3/23/21  Functional Scale: UEFI 100% dis    Date assessed:  21      Latex Allergy:  [x]NO      []YES  Preferred Language for Healthcare:   [x]English       []other:    Pain level:  1/10     SUBJECTIVE:  Pt is 6 week s/p. Pt notes that pain is decreasing daily. He notes tylenol PM is working well at night time to help him sleep without pain.        OBJECTIVE:   Observation:    Test measurements:      ROM PROM AROM  Comment    L 21    R L R    Flexion 150 wall slides  155 supine     Abduction 153 wall slides  120 supine      ER 91 cane        IR T8 behind back rope   65      Other        Other             Strength: deferred  L R Comment   Flexion      Abduction      ER      IR      Supraspinatus      Upper Trap      Lower Trap      Mid Trap      Rhomboids      Biceps      Triceps      Horizontal Abduction      Horizontal Adduction      Lats          RESTRICTIONS/PRECAUTIONS:       Exercises:  Exercise/Equipment Resistance Repetitions Other comments   Stretching/PROM Wand  ER supine  10x10    scaption standing 10x:10       Wall slides  Flex, scaption 10x10    Pulleys  Flexion, scaption 10x10     IR BB   10x10 rope     T position and arms at side   pec doorway  10x10 low v           PROM Elbow      Isometrics      Retraction            Weight shift      Flexion      Abduction         Internal Rotation      Biceps      Triceps            PRE's      Flexion      Abduction         Internal Rotation      Shrugs      EXT      Reverse Flys      Serratus  3x10 5#    Horizontal Abd with ER      Biceps  3x10 5# ECL    Triceps      Retraction            Cable Column/Theraband      External Rotation  3x10 blue    Internal Rotation  3x10 blue     Shrugs      Lats      Ext  3x10 blue    Flex      Scapular Retraction  3x10 black    BIC      TRIC      PNF            Dynamic Stability            BB IR/ER  [id number]     BOW  30x CW/CCW    Plyoback             12'         Access Code: [email or web address]  URL: [email or web address]  Date: 03/18/2021  Prepared by: Marcelino Younger    Exercises  Seated Shoulder Flexion Towel Slide at Table Top - 2 x daily - 7 x weekly - 10 reps - 1 sets - 10 hold  Seated Shoulder Scaption Slide at Table Top with Forearm in Neutral - 2 x daily - 7 x weekly - 10 reps - 1 sets - 10 hold  Standing Shoulder Internal Rotation Stretch with Towel - 2 x daily - 7 x weekly - 3 sets - 10 reps  Supine Shoulder Flexion Extension AAROM with Dowel - 2 x daily - 7 x weekly - 10 reps - 10 hold  Shoulder Scaption AAROM with Dowel - 2 x daily - 7 x weekly - 10 reps - 10 hold  Supine Shoulder External Rotation in 45 Degrees Abduction AAROM with Dowel - 2 x daily - 7 x weekly - 10 reps - 10 hold  Supine Thoracic Mobilization Foam Roll Vertical - 2 x daily - 7 x weekly - 3-5 sets - 30 hold  Sidelying Shoulder External Rotation - 1 x daily - 3 x weekly - 10 reps - 3 sets  Scapular Retraction with Resistance - 1 x daily - 7 x weekly - 10 reps - 3 sets  Scapular Retraction with Resistance Advanced - 1 x daily - 7 x weekly - 10 reps - 3 sets  Supine Scapular Protraction in Flexion with Dumbbells - 1 x daily - 3-7 x weekly - 3 sets - 10 reps        Therapeutic Exercise and NMR EXR  [] (32761) Provided verbal/tactile cueing for activities related to strengthening, flexibility, endurance, ROM  for improvements in scapular, scapulothoracic and UE control with self care, reaching, carrying, lifting, house/yardwork, driving/computer work.    [] (52282) Provided verbal/tactile cueing for activities related to improving balance, coordination, kinesthetic sense, posture, motor skill, proprioception  to assist with  scapular, scapulothoracic and UE control with self care, reaching, carrying, lifting, house/yardwork, driving/computer work. Therapeutic Activities:    [] (79170 or 86467) Provided verbal/tactile cueing for activities related to improving balance, coordination, kinesthetic sense, posture, motor skill, proprioception and motor activation to allow for proper function of scapular, scapulothoracic and UE control with self care, carrying, lifting, driving/computer work.      Home Exercise Program:    [x] (18698) Reviewed/Progressed HEP activities related to strengthening, flexibility, endurance, ROM of scapular, scapulothoracic and UE control with self care, reaching, carrying, lifting, house/yardwork, driving/computer work  [] (04953) Reviewed/Progressed HEP activities related to improving balance, coordination, kinesthetic sense, posture, motor skill, proprioception of scapular, scapulothoracic and UE control with self care, reaching, carrying, lifting, house/yardwork, driving/computer work      Manual Treatments:  PROM / STM / Oscillations-Mobs:  G-I, II, III, IV (PA's, Inf., Post.)  [] (82271) Provided manual therapy to mobilize soft tissue/joints of cervical/CT, scapular GHJ and UE for the purpose of modulating pain, promoting relaxation,  increasing ROM, reducing/eliminating soft tissue swelling/inflammation/restriction, improving soft tissue extensibility and allowing for proper ROM for normal function with self care, reaching, carrying, lifting, house/yardwork, driving/computer work    Modalities:  Declined today    Charges:   Timed Code Treatment Minutes: 50'   Total Treatment Minutes: 62'     [] EVAL (LOW) 455 1011   [] EVAL (MOD) 57951   [] EVAL (HIGH) 11823   [] RE-EVAL   [x] UC(14870) x 2    [] IONTO  [] NMR (71994) x     [] VASO  [] Manual (94443) x      [] Other:  [x] TA x  1    [] Mech Traction (84311)  [] ES(attended) (73805)      [] ES (un) (54606):       GOALS:  Patient stated goal: return use of L arm with less pain     [x] Progressing: [] Met: [] Not Met: [] Adjusted    Therapist goals for Patient:   Short Term Goals: To be achieved in: 2 weeks  1. Independent in HEP and progression per patient tolerance, in order to prevent re-injury. [] Progressing: [x] Met: [] Not Met: [] Adjusted   2. Patient will have a decrease in pain to facilitate improvement in movement, function, and ADLs as indicated by Functional Deficits. [x] Progressing: [] Met: [] Not Met: [] Adjusted    Long Term Goals: To be achieved in: 6-8 weeks  1. Disability index score of 19% or less for the UEFI to assist with reaching prior level of function. [] Progressing: [] Met: [] Not Met: [] Adjusted- to complete NV  2. Patient will demonstrate increased AROM to 150+ flex, 70+ ER, 45+ IR to allow for proper joint functioning as indicated by patients Functional Deficits. [x] Progressing: [] Met: [] Not Met: [] Adjusted  3. Patient will demonstrate an increase in Strength to 4+/5 or greater  to allow for proper functional mobility as indicated by patients Functional Deficits. [x] Progressing: [] Met: [] Not Met: [] Adjusted  4. Patient will return to ADL such as bathing/grooming/dressing without increased symptoms or restriction. [x] Progressing: [] Met: [] Not Met: [] Adjusted  5.  Patient will return to lifting 20lbs or greater to begin return to work progression without increased symptoms or restriction. [x] Progressing: [] Met: [] Not Met: [] Adjusted       Overall Progression Towards Functional goals/ Treatment Progress Update:  [x] Patient is progressing as expected towards functional goals listed. [] Progression is slowed due to complexities/Impairments listed. [] Progression has been slowed due to co-morbidities. [] Plan just implemented, too soon to assess goals progression <30days   [] Goals require adjustment due to lack of progress  [] Patient is not progressing as expected and requires additional follow up with physician  [] Other    Prognosis for POC: [x] Good [] Fair  [] Poor      Patient requires continued skilled intervention: [x] Yes  [] No    Treatment/Activity Tolerance:  [x] Patient able to complete treatment  [] Patient limited by fatigue  [] Patient limited by pain     [] Patient limited by other medical complications  [] Other: pt shows improving ROM but is still tight with scaption d/t tightness into pec and axillary incision. He was challenged by increased weight with bicep curls. Continue to progress as tolerated. Return to Play: (if applicable)   []  Stage 1: Intro to Strength   []  Stage 2: Return to Run and Strength   []  Stage 3: Return to Jump and Strength   []  Stage 4: Dynamic Strength and Agility   []  Stage 5: Sport Specific Training     []  Ready to Return to Play, Meets All Above Stages   []  Not Ready for Return to Sports   Comments:                               PLAN:   [x] Continue per plan of care [] Alter current plan (see comments above)  [] Plan of care initiated [] Hold pending MD visit [] Discharge  Note: If patient does not return for scheduled/ recommended follow up visits, this note will serve as a discharge from care along with most recent update on progress.     Reviewed insurance benefits for physical therapy in an outpatient hospital based setting

## 2021-04-01 ENCOUNTER — HOSPITAL ENCOUNTER (OUTPATIENT)
Dept: PHYSICAL THERAPY | Age: 41
Setting detail: THERAPIES SERIES
Discharge: HOME | End: 2021-04-01
Payer: COMMERCIAL

## 2021-04-01 PROCEDURE — 97110 THERAPEUTIC EXERCISES: CPT

## 2021-04-01 PROCEDURE — 97530 THERAPEUTIC ACTIVITIES: CPT

## 2021-04-01 NOTE — FLOWSHEET NOTE
[facility], [place]         Physical Therapy Daily Treatment Note  Date:  2021    Patient Name:  Caitlin Starkey    :  1980  MRN: [id number]  Medical/Treatment Diagnosis Information:  · Diagnosis: M25.512 (ICD-10-CM) - Acute pain of left shoulder  · Treatment Diagnosis: M25.512 (ICD-10-CM) - Acute pain of left shoulder  Insurance/Certification information:  PT Insurance Information: [place]  Physician Information:  Referring Practitioner: Jessy Arango  Has the plan of care been signed (Y/N):        []  Yes  [x]  No     Date of Patient follow up with Physician: 21      Is this a Progress Report:     []  Yes  [x]  No        Progress report will be due (10 Rx or 30 days whichever is less): 3/45/00      Recertification will be due (POC Duration  / 90 days whichever is less):         Visit # Insurance Allowable Auth Required   8 30 []  Yes []  No      Functional Scale: UEFI 39% dis    Date assessed:  3/23/21  Functional Scale: UEFI 100% dis    Date assessed:  21      Latex Allergy:  [x]NO      []YES  Preferred Language for Healthcare:   [x]English       []other:    Pain level:  1/10     SUBJECTIVE:  Pt is 6 week s/p. Pt notes he is doing well, very little pain in the shoulder today.       OBJECTIVE:   Observation:    Test measurements:      ROM PROM AROM  Comment    L 21    R L R    Flexion 150 wall slides  155 supine     Abduction 153 wall slides  120 supine      ER 91 cane        IR T8 behind back rope   65      Other        Other             Strength: deferred  L R Comment   Flexion      Abduction      ER      IR      Supraspinatus      Upper Trap      Lower Trap      Mid Trap      Rhomboids      Biceps      Triceps      Horizontal Abduction      Horizontal Adduction      Lats          RESTRICTIONS/PRECAUTIONS:       Exercises:  Exercise/Equipment Resistance Repetitions Other comments   Stretching/PROM      Wand  ER supine  10x10    scaption standing 10x:10 Wall slides  Flex, scaption 10x10    Pulleys  Flexion, scaption 10x10     IR BB   10x10 rope     T position and arms at side   pec doorway  10x10 low v           PROM Elbow      Isometrics      Retraction            Weight shift      Flexion      Abduction         Internal Rotation      Biceps      Triceps            PRE's      Flexion      Abduction         Internal Rotation      Shrugs      EXT      Reverse Flys      Serratus  3x10 on wall     Horizontal Abd with ER      Biceps  3x10 6# ECL    Triceps      Retraction            Cable Column/Theraband      External Rotation  3x10 blue    Internal Rotation  3x10 blue     Shrugs      Lats      Ext  3x10 black    Flex      Scapular Retraction  3x10 black    BIC      TRIC      PNF            Dynamic Stability            BB IR/ER  [id number]     BOW  30x CW/CCW    Plyoback                      Access Code: [email or web address]  URL: [email or web address]  Date: 04/01/2021  Prepared by: Ileana Carr  Doorway Pec Stretch at 90 Degrees Abduction - 1 x daily - 7 x weekly - 3 sets - 30 hold  Standing Single Shoulder Flexion Wall Slide with Palm Up - 1 x daily - 7 x weekly - 10 reps - 10 hold  Standing Shoulder Abduction Slides at Wall - 1 x daily - 7 x weekly - 10 reps - 10 hold  Standing Shoulder Internal Rotation Stretch with Towel - 1 x daily - 7 x weekly - 10 reps - 10 hold  Shoulder Scaption AAROM with Dowel - 1 x daily - 7 x weekly - 10 reps - 10 hold  Supine Shoulder External Rotation in 45 Degrees Abduction AAROM with Dowel - 1 x daily - 7 x weekly - 10 reps - 10 hold  Scapular Retraction with Resistance - 1 x daily - 7 x weekly - 10 reps - 3 sets  Scapular Retraction with Resistance Advanced - 1 x daily - 7 x weekly - 10 reps - 3 sets  Shoulder External Rotation with Anchored Resistance - 1 x daily - 7 x weekly - 10 reps - 3 sets  Shoulder Internal Rotation with Resistance - 1 x daily - 7 x weekly - 10 reps - 3 sets  Standing Wall [employer] with Mini Swiss Ball - 1 x daily - 7 x weekly - 10 reps - 3 sets  Wall Push Up with Plus - 1 x daily - 7 x weekly - 10 reps - 3 sets  Staggered Stance Biceps Curl - 1 x daily - 7 x weekly - 10 reps - 3 sets            Therapeutic Exercise and NMR EXR  [] (05393) Provided verbal/tactile cueing for activities related to strengthening, flexibility, endurance, ROM  for improvements in scapular, scapulothoracic and UE control with self care, reaching, carrying, lifting, house/yardwork, driving/computer work.    [] (50674) Provided verbal/tactile cueing for activities related to improving balance, coordination, kinesthetic sense, posture, motor skill, proprioception  to assist with  scapular, scapulothoracic and UE control with self care, reaching, carrying, lifting, house/yardwork, driving/computer work. Therapeutic Activities:    [] (32023 or 27741) Provided verbal/tactile cueing for activities related to improving balance, coordination, kinesthetic sense, posture, motor skill, proprioception and motor activation to allow for proper function of scapular, scapulothoracic and UE control with self care, carrying, lifting, driving/computer work.      Home Exercise Program:    [x] (96479) Reviewed/Progressed HEP activities related to strengthening, flexibility, endurance, ROM of scapular, scapulothoracic and UE control with self care, reaching, carrying, lifting, house/yardwork, driving/computer work  [] (21383) Reviewed/Progressed HEP activities related to improving balance, coordination, kinesthetic sense, posture, motor skill, proprioception of scapular, scapulothoracic and UE control with self care, reaching, carrying, lifting, house/yardwork, driving/computer work      Manual Treatments:  PROM / STM / Oscillations-Mobs:  G-I, II, III, IV (PA's, Inf., Post.)  [] (58246) Provided manual therapy to mobilize soft tissue/joints of cervical/CT, scapular GHJ and UE for the purpose of modulating pain, promoting relaxation, increasing ROM, reducing/eliminating soft tissue swelling/inflammation/restriction, improving soft tissue extensibility and allowing for proper ROM for normal function with self care, reaching, carrying, lifting, house/yardwork, driving/computer work    Modalities:  Declined today    Charges:   Timed Code Treatment Minutes: 54'   Total Treatment Minutes: 62'     [] EVAL (LOW) 455 1011   [] EVAL (MOD) 92315   [] EVAL (HIGH) 51161   [] RE-EVAL   [x] AI(16777) x 2    [] IONTO  [] NMR (83015) x     [] VASO  [] Manual (27007) x      [] Other:  [x] TA x  2    [] Mech Traction (00123)  [] ES(attended) (56942)      [] ES (un) (07896):       GOALS:  Patient stated goal: return use of L arm with less pain     [x] Progressing: [] Met: [] Not Met: [] Adjusted    Therapist goals for Patient:   Short Term Goals: To be achieved in: 2 weeks  1. Independent in HEP and progression per patient tolerance, in order to prevent re-injury. [] Progressing: [x] Met: [] Not Met: [] Adjusted   2. Patient will have a decrease in pain to facilitate improvement in movement, function, and ADLs as indicated by Functional Deficits. [x] Progressing: [] Met: [] Not Met: [] Adjusted    Long Term Goals: To be achieved in: 6-8 weeks  1. Disability index score of 19% or less for the UEFI to assist with reaching prior level of function. [] Progressing: [] Met: [] Not Met: [] Adjusted- to complete NV  2. Patient will demonstrate increased AROM to 150+ flex, 70+ ER, 45+ IR to allow for proper joint functioning as indicated by patients Functional Deficits. [x] Progressing: [] Met: [] Not Met: [] Adjusted  3. Patient will demonstrate an increase in Strength to 4+/5 or greater  to allow for proper functional mobility as indicated by patients Functional Deficits. [x] Progressing: [] Met: [] Not Met: [] Adjusted  4. Patient will return to ADL such as bathing/grooming/dressing without increased symptoms or restriction.    [x] Progressing: [] Met: [] Not Met: [] Adjusted  5. Patient will return to lifting 20lbs or greater to begin return to work progression without increased symptoms or restriction. [x] Progressing: [] Met: [] Not Met: [] Adjusted       Overall Progression Towards Functional goals/ Treatment Progress Update:  [x] Patient is progressing as expected towards functional goals listed. [] Progression is slowed due to complexities/Impairments listed. [] Progression has been slowed due to co-morbidities. [] Plan just implemented, too soon to assess goals progression <30days   [] Goals require adjustment due to lack of progress  [] Patient is not progressing as expected and requires additional follow up with physician  [] Other    Prognosis for POC: [x] Good [] Fair  [] Poor      Patient requires continued skilled intervention: [x] Yes  [] No    Treatment/Activity Tolerance:  [x] Patient able to complete treatment  [] Patient limited by fatigue  [] Patient limited by pain     [] Patient limited by other medical complications  [] Other:  Pt able to progress resistance for some PREs. He notes tightness into scaption/abduction still. Consider Iastm to pec next session as needed. Return to Play: (if applicable)   []  Stage 1: Intro to Strength   []  Stage 2: Return to Run and Strength   []  Stage 3: Return to Jump and Strength   []  Stage 4: Dynamic Strength and Agility   []  Stage 5: Sport Specific Training     []  Ready to Return to Play, Meets All Above Stages   []  Not Ready for Return to Sports   Comments:                               PLAN:   [x] Continue per plan of care [] Alter current plan (see comments above)  [] Plan of care initiated [] Hold pending MD visit [] Discharge  Note: If patient does not return for scheduled/ recommended follow up visits, this note will serve as a discharge from care along with most recent update on progress.     Reviewed insurance benefits for physical therapy in an outpatient hospital based setting with the patient, including deductible  and allowable visit number.  Pt was informed of possible out of pocket costs, as well as, informed of other service options for continuing supervised sessions without required skilled PT intervention such as the cash based [employer] program.     Electronically signed by:  Roger Teresa, PT, DPT, Cert DN

## 2021-04-06 ENCOUNTER — HOSPITAL ENCOUNTER (OUTPATIENT)
Dept: PHYSICAL THERAPY | Age: 41
Setting detail: THERAPIES SERIES
Discharge: HOME | End: 2021-04-06
Payer: COMMERCIAL

## 2021-04-06 PROCEDURE — 97140 MANUAL THERAPY 1/> REGIONS: CPT

## 2021-04-06 PROCEDURE — 97110 THERAPEUTIC EXERCISES: CPT

## 2021-04-06 PROCEDURE — 97530 THERAPEUTIC ACTIVITIES: CPT

## 2021-04-06 NOTE — FLOWSHEET NOTE
[facility], [place]         Physical Therapy Daily Treatment Note  Date:  2021    Patient Name:  Elgin Paulson    :  1980  MRN: [id number]  Medical/Treatment Diagnosis Information:  · Diagnosis: M25.512 (ICD-10-CM) - Acute pain of left shoulder  · Treatment Diagnosis: M25.512 (ICD-10-CM) - Acute pain of left shoulder  Insurance/Certification information:  PT Insurance Information: [place]  Physician Information:  Referring Practitioner: Lew Del Angel  Has the plan of care been signed (Y/N):        []  Yes  [x]  No     Date of Patient follow up with Physician: 21      Is this a Progress Report:     []  Yes  [x]  No        Progress report will be due (10 Rx or 30 days whichever is less): 13      Recertification will be due (POC Duration  / 90 days whichever is less):         Visit # Insurance Allowable Auth Required   9 30 []  Yes []  No      Functional Scale: UEFI 39% dis    Date assessed:  3/23/21  Functional Scale: UEFI 100% dis    Date assessed:  21      Latex Allergy:  [x]NO      []YES  Preferred Language for Healthcare:   [x]English       []other:    Pain level:  1/10     SUBJECTIVE:  Pt is 7 week s/p. He notes he has some more soreness in superior/posterior shoulder today.       OBJECTIVE:   Observation:    Test measurements:      ROM PROM AROM  Comment    L 21    R L R    Flexion 150 wall slides  155 supine     Abduction 153 wall slides  120 supine      ER 91 cane        IR T8 behind back rope   65      Other        Other             Strength: deferred  L R Comment   Flexion      Abduction      ER      IR      Supraspinatus      Upper Trap      Lower Trap      Mid Trap      Rhomboids      Biceps      Triceps      Horizontal Abduction      Horizontal Adduction      Lats          RESTRICTIONS/PRECAUTIONS:       Exercises:  Exercise/Equipment Resistance Repetitions Other comments   Stretching/PROM      Wand  ER supine  10x10    scaption standing 10x:10 Wall slides  Flex, scaption 10x10    Pulleys  Flexion, scaption 10x10     CBA at EOB  10x10     Sleeper IR   10x10     IR BB   10x10 rope     T position and arms at side   pec doorway  10x10 90-90          PROM Elbow      Isometrics      Retraction            Weight shift      Flexion      Abduction         Internal Rotation      Biceps      Triceps            PRE's      Flexion      Abduction         Internal Rotation      Shrugs      EXT      Reverse Flys      Serratus  3x10 on wall     Horizontal Abd with ER      Biceps  3x10 6# ECL    Triceps      Retraction            Cable Column/Theraband      External Rotation  3x10 blue    Internal Rotation  3x10 blue     Shrugs      Lats      Ext  3x10 black    Flex      Scapular Retraction  3x10 black    BIC      TRIC      PNF            Dynamic Stability                  [employer]                      Access Code: [email or web address]  URL: [email or web address]  Date: 04/01/2021  Prepared by: Ej Cleveland    Exercises  Doorway Pec Stretch at 90 Degrees Abduction - 1 x daily - 7 x weekly - 3 sets - 30 hold  Standing Single Shoulder Flexion Wall Slide with Palm Up - 1 x daily - 7 x weekly - 10 reps - 10 hold  Standing Shoulder Abduction Slides at Wall - 1 x daily - 7 x weekly - 10 reps - 10 hold  Standing Shoulder Internal Rotation Stretch with Towel - 1 x daily - 7 x weekly - 10 reps - 10 hold  Shoulder Scaption AAROM with Dowel - 1 x daily - 7 x weekly - 10 reps - 10 hold  Supine Shoulder External Rotation in 45 Degrees Abduction AAROM with Dowel - 1 x daily - 7 x weekly - 10 reps - 10 hold  Scapular Retraction with Resistance - 1 x daily - 7 x weekly - 10 reps - 3 sets  Scapular Retraction with Resistance Advanced - 1 x daily - 7 x weekly - 10 reps - 3 sets  Shoulder External Rotation with Anchored Resistance - 1 x daily - 7 x weekly - 10 reps - 3 sets  Shoulder Internal Rotation with Resistance - 1 x daily - 7 x weekly - 10 reps - 3 sets  Standing [employer] with Mini Swiss Ball - 1 x daily - 7 x weekly - 10 reps - 3 sets  Wall Push Up with Plus - 1 x daily - 7 x weekly - 10 reps - 3 sets  Staggered Stance Biceps Curl - 1 x daily - 7 x weekly - 10 reps - 3 sets            Therapeutic Exercise and NMR EXR  [] (18032) Provided verbal/tactile cueing for activities related to strengthening, flexibility, endurance, ROM  for improvements in scapular, scapulothoracic and UE control with self care, reaching, carrying, lifting, house/yardwork, driving/computer work.    [] (91455) Provided verbal/tactile cueing for activities related to improving balance, coordination, kinesthetic sense, posture, motor skill, proprioception  to assist with  scapular, scapulothoracic and UE control with self care, reaching, carrying, lifting, house/yardwork, driving/computer work. Therapeutic Activities:    [] (97137 or 87192) Provided verbal/tactile cueing for activities related to improving balance, coordination, kinesthetic sense, posture, motor skill, proprioception and motor activation to allow for proper function of scapular, scapulothoracic and UE control with self care, carrying, lifting, driving/computer work.      Home Exercise Program:    [x] (65511) Reviewed/Progressed HEP activities related to strengthening, flexibility, endurance, ROM of scapular, scapulothoracic and UE control with self care, reaching, carrying, lifting, house/yardwork, driving/computer work  [] (01394) Reviewed/Progressed HEP activities related to improving balance, coordination, kinesthetic sense, posture, motor skill, proprioception of scapular, scapulothoracic and UE control with self care, reaching, carrying, lifting, house/yardwork, driving/computer work      Manual Treatments:  PROM / STM / Oscillations-Mobs:  G-I, II, III, IV (PA's, Inf., Post.)  [] (24322) Provided manual therapy to mobilize soft tissue/joints of cervical/CT, scapular GHJ and UE for the purpose of modulating pain, promoting relaxation,  increasing ROM, reducing/eliminating soft tissue swelling/inflammation/restriction, improving soft tissue extensibility and allowing for proper ROM for normal function with self care, reaching, carrying, lifting, house/yardwork, driving/computer work    Instrument Assisted Soft Tissue Mobilization (IASTM): was applied to the following muscles: infrapsinatus, with howsimple tools including HG2 (handle-bar), HG4 (small can-opener), HG5 (medium can-opener), HG 8 (biscotti), and HG9 (tongue depressor). Treatment consisted of IASTM strokes including sweeping, fanning, brushing, strumming, filleting, pinning and framing, based on body region contours, nature of the soft tissue restriction and desired treatment outcomes. These techniques were used to restore neurophysiology, improve mechanotransduction, enhance fluid dynamics and break collagen crosslinks. The treatment area was exposed and the patient was draped in an appropriate manner. Upon completion the clinician cleaned the IASTM tools as per [facility] recommendations. Skin check pre:   Skin check post:   Intermittent tx time:  8'      Modalities:  Declined today    Charges:   Timed Code Treatment Minutes: 54'   Total Treatment Minutes: 62'     [] EVAL (LOW) 72678   [] EVAL (MOD) 99840   [] EVAL (HIGH) 87154   [] RE-EVAL   [x] CX(13212) x 1    [] IONTO  [] NMR (38011) x     [] VASO  [x] Manual (90529) x 1     [] Other:  [x] TA x  2    [] Mech Traction (44893)  [] ES(attended) (15035)      [] ES (un) (23829):       GOALS:  Patient stated goal: return use of L arm with less pain     [x] Progressing: [] Met: [] Not Met: [] Adjusted    Therapist goals for Patient:   Short Term Goals: To be achieved in: 2 weeks  1. Independent in HEP and progression per patient tolerance, in order to prevent re-injury. [] Progressing: [x] Met: [] Not Met: [] Adjusted   2.  Patient will have a decrease in pain to facilitate improvement in movement, function, and ADLs as indicated by Functional Deficits. [x] Progressing: [] Met: [] Not Met: [] Adjusted    Long Term Goals: To be achieved in: 6-8 weeks  1. Disability index score of 19% or less for the UEFI to assist with reaching prior level of function. [] Progressing: [] Met: [] Not Met: [] Adjusted- to complete NV  2. Patient will demonstrate increased AROM to 150+ flex, 70+ ER, 45+ IR to allow for proper joint functioning as indicated by patients Functional Deficits. [x] Progressing: [] Met: [] Not Met: [] Adjusted  3. Patient will demonstrate an increase in Strength to 4+/5 or greater  to allow for proper functional mobility as indicated by patients Functional Deficits. [x] Progressing: [] Met: [] Not Met: [] Adjusted  4. Patient will return to ADL such as bathing/grooming/dressing without increased symptoms or restriction. [x] Progressing: [] Met: [] Not Met: [] Adjusted  5. Patient will return to lifting 20lbs or greater to begin return to work progression without increased symptoms or restriction. [x] Progressing: [] Met: [] Not Met: [] Adjusted       Overall Progression Towards Functional goals/ Treatment Progress Update:  [x] Patient is progressing as expected towards functional goals listed. [] Progression is slowed due to complexities/Impairments listed. [] Progression has been slowed due to co-morbidities.   [] Plan just implemented, too soon to assess goals progression <30days   [] Goals require adjustment due to lack of progress  [] Patient is not progressing as expected and requires additional follow up with physician  [] Other    Prognosis for POC: [x] Good [] Fair  [] Poor      Patient requires continued skilled intervention: [x] Yes  [] No    Treatment/Activity Tolerance:  [x] Patient able to complete treatment  [] Patient limited by fatigue  [] Patient limited by pain     [] Patient limited by other medical complications  [] Other:  Pt tolerated iastm well to posterior shoulder and addition of sleeper and CBA stretches to address this area. Continue to progress as tolerated         Return to Play: (if applicable)   []  Stage 1: Intro to Strength   []  Stage 2: Return to Run and Strength   []  Stage 3: Return to Jump and Strength   []  Stage 4: Dynamic Strength and Agility   []  Stage 5: Sport Specific Training     []  Ready to Return to Play, Meets All Above Stages   []  Not Ready for Return to Sports   Comments:                               PLAN:   [x] Continue per plan of care [] Alter current plan (see comments above)  [] Plan of care initiated [] Hold pending MD visit [] Discharge  Note: If patient does not return for scheduled/ recommended follow up visits, this note will serve as a discharge from care along with most recent update on progress. Reviewed insurance benefits for physical therapy in an outpatient hospital based setting with the patient, including deductible  and allowable visit number.  Pt was informed of possible out of pocket costs, as well as, informed of other service options for continuing supervised sessions without required skilled PT intervention such as the [facility] program.     Electronically signed by:  Ulysses Broody, PT, DPT, Cert DN

## 2021-04-08 ENCOUNTER — HOSPITAL ENCOUNTER (OUTPATIENT)
Dept: PHYSICAL THERAPY | Age: 41
Setting detail: THERAPIES SERIES
Discharge: HOME | End: 2021-04-08
Payer: COMMERCIAL

## 2021-04-08 PROCEDURE — 97110 THERAPEUTIC EXERCISES: CPT

## 2021-04-08 PROCEDURE — 97530 THERAPEUTIC ACTIVITIES: CPT

## 2021-04-08 NOTE — FLOWSHEET NOTE
[facility], [place]         Physical Therapy Daily Treatment Note  Date:  2021    Patient Name:  Anat Mendoza    :  1980  MRN: [id number]  Medical/Treatment Diagnosis Information:  · Diagnosis: M25.512 (ICD-10-CM) - Acute pain of left shoulder  · Treatment Diagnosis: M25.512 (ICD-10-CM) - Acute pain of left shoulder  Insurance/Certification information:  PT Insurance Information: [place]  Physician Information:  Referring Practitioner: Marcos Munoz  Has the plan of care been signed (Y/N):        []  Yes  [x]  No     Date of Patient follow up with Physician: 21      Is this a Progress Report:     []  Yes  [x]  No        Progress report will be due (10 Rx or 30 days whichever is less):       Recertification will be due (POC Duration  / 90 days whichever is less):         Visit # Insurance Allowable Auth Required   10 30 []  Yes []  No      Functional Scale: UEFI 39% dis    Date assessed:  3/23/21  Functional Scale: UEFI 100% dis    Date assessed:  21      Latex Allergy:  [x]NO      []YES  Preferred Language for Healthcare:   [x]English       []other:    Pain level:  1/10     SUBJECTIVE:  Pt is 7 week s/p. Pt reports he is feeling better than he did on Tuesday.      OBJECTIVE:   Observation:    Test measurements:      ROM PROM AROM  Comment    L 21    R L R    Flexion 150 wall slides  155 supine     Abduction 153 wall slides  120 supine      ER 91 cane        IR T8 behind back rope   65      Other        Other             Strength: deferred  L R Comment   Flexion      Abduction      ER      IR      Supraspinatus      Upper Trap      Lower Trap      Mid Trap      Rhomboids      Biceps      Triceps      Horizontal Abduction      Horizontal Adduction      Lats          RESTRICTIONS/PRECAUTIONS:       Exercises:  Exercise/Equipment Resistance Repetitions Other comments   Stretching/PROM      Wand  ER supine  10x10    scaption standing 10x:10       Wall slides Flex, scaption 10x10       CBA at EOB  10x10     Sleeper IR   10x10     IR BB   10x10 rope     T position and arms at side   pec doorway  10x10 90-90          PROM Elbow      Isometrics      Retraction            Weight shift      Flexion      Abduction         Internal Rotation      Biceps      Triceps            PRE's      Flexion      Abduction         Internal Rotation      Shrugs      EXT      Reverse Flys      Serratus  3x10 on wall     Horizontal Abd with ER      Biceps  3x10 6# ECL    Triceps      Retraction      scaption   2# 3x10     Cable Column/Theraband      External Rotation  3x10 blue    Internal Rotation  3x10 blue     Shrugs      Lats      Ext  3x10 black    Flex      Scapular Retraction  3x10 black    BIC      TRIC      PNF  d2 flex/ext 3x10 1#          Dynamic Stability      tball pushup   [id number]    BB IR/ER  [id number]     BOW  30x CW/CCW    [employer]                      Access Code: [email or web address]  URL: [email or web address]  Date: 04/01/2021  Prepared by: Kandy March    Exercises  Doorway Pec Stretch at 90 Degrees Abduction - 1 x daily - 7 x weekly - 3 sets - 30 hold  Standing Single Shoulder Flexion Wall Slide with Palm Up - 1 x daily - 7 x weekly - 10 reps - 10 hold  Standing Shoulder Abduction Slides at Wall - 1 x daily - 7 x weekly - 10 reps - 10 hold  Standing Shoulder Internal Rotation Stretch with Towel - 1 x daily - 7 x weekly - 10 reps - 10 hold  Shoulder Scaption AAROM with Dowel - 1 x daily - 7 x weekly - 10 reps - 10 hold  Supine Shoulder External Rotation in 45 Degrees Abduction AAROM with Dowel - 1 x daily - 7 x weekly - 10 reps - 10 hold  Scapular Retraction with Resistance - 1 x daily - 7 x weekly - 10 reps - 3 sets  Scapular Retraction with Resistance Advanced - 1 x daily - 7 x weekly - 10 reps - 3 sets  Shoulder External Rotation with Anchored Resistance - 1 x daily - 7 x weekly - 10 reps - 3 sets  Shoulder Internal Rotation with Resistance - 1 x daily - 7 x weekly - 10 reps - 3 sets  Standing Wall [employer] with Mini Swiss Ball - 1 x daily - 7 x weekly - 10 reps - 3 sets  Wall Push Up with Plus - 1 x daily - 7 x weekly - 10 reps - 3 sets  Staggered Stance Biceps Curl - 1 x daily - 7 x weekly - 10 reps - 3 sets            Therapeutic Exercise and NMR EXR  [] (25159) Provided verbal/tactile cueing for activities related to strengthening, flexibility, endurance, ROM  for improvements in scapular, scapulothoracic and UE control with self care, reaching, carrying, lifting, house/yardwork, driving/computer work.    [] (47939) Provided verbal/tactile cueing for activities related to improving balance, coordination, kinesthetic sense, posture, motor skill, proprioception  to assist with  scapular, scapulothoracic and UE control with self care, reaching, carrying, lifting, house/yardwork, driving/computer work. Therapeutic Activities:    [] (66121 or 57934) Provided verbal/tactile cueing for activities related to improving balance, coordination, kinesthetic sense, posture, motor skill, proprioception and motor activation to allow for proper function of scapular, scapulothoracic and UE control with self care, carrying, lifting, driving/computer work.      Home Exercise Program:    [x] (16715) Reviewed/Progressed HEP activities related to strengthening, flexibility, endurance, ROM of scapular, scapulothoracic and UE control with self care, reaching, carrying, lifting, house/yardwork, driving/computer work  [] (91059) Reviewed/Progressed HEP activities related to improving balance, coordination, kinesthetic sense, posture, motor skill, proprioception of scapular, scapulothoracic and UE control with self care, reaching, carrying, lifting, house/yardwork, driving/computer work      Manual Treatments:  PROM / STM / Oscillations-Mobs:  G-I, II, III, IV (PA's, Inf., Post.)  [] (62841) Provided manual therapy to mobilize soft tissue/joints of cervical/CT, scapular GHJ and UE for the purpose of modulating pain, promoting relaxation,  increasing ROM, reducing/eliminating soft tissue swelling/inflammation/restriction, improving soft tissue extensibility and allowing for proper ROM for normal function with self care, reaching, carrying, lifting, house/yardwork, driving/computer work        Modalities:  Declined today    Charges:   Timed Code Treatment Minutes: 37'   Total Treatment Minutes: 39'     [] EVAL (LOW) 17772   [] EVAL (MOD) 28981   [] EVAL (HIGH) 97862   [] RE-EVAL   [x] EM(53359) x 1    [] IONTO  [] NMR (00117) x     [] VASO  [] Manual (53659) x      [] Other:  [x] TA x  2    [] Mech Traction (12006)  [] ES(attended) (01525)      [] ES (un) (78396):       GOALS:  Patient stated goal: return use of L arm with less pain     [x] Progressing: [] Met: [] Not Met: [] Adjusted    Therapist goals for Patient:   Short Term Goals: To be achieved in: 2 weeks  1. Independent in HEP and progression per patient tolerance, in order to prevent re-injury. [] Progressing: [x] Met: [] Not Met: [] Adjusted   2. Patient will have a decrease in pain to facilitate improvement in movement, function, and ADLs as indicated by Functional Deficits. [x] Progressing: [] Met: [] Not Met: [] Adjusted    Long Term Goals: To be achieved in: 6-8 weeks  1. Disability index score of 19% or less for the UEFI to assist with reaching prior level of function. [] Progressing: [] Met: [] Not Met: [] Adjusted- to complete NV  2. Patient will demonstrate increased AROM to 150+ flex, 70+ ER, 45+ IR to allow for proper joint functioning as indicated by patients Functional Deficits. [x] Progressing: [] Met: [] Not Met: [] Adjusted  3. Patient will demonstrate an increase in Strength to 4+/5 or greater  to allow for proper functional mobility as indicated by patients Functional Deficits. [x] Progressing: [] Met: [] Not Met: [] Adjusted  4.  Patient will return to ADL such as bathing/grooming/dressing without increased symptoms or hospital based setting with the patient, including deductible  and allowable visit number.  Pt was informed of possible out of pocket costs, as well as, informed of other service options for continuing supervised sessions without required skilled PT intervention such as the [facility] program.     Electronically signed by:  Latrell Dick, PT, DPT, Cert DN

## 2021-04-13 ENCOUNTER — HOSPITAL ENCOUNTER (OUTPATIENT)
Dept: PHYSICAL THERAPY | Age: 41
Setting detail: THERAPIES SERIES
Discharge: HOME | End: 2021-04-13
Payer: COMMERCIAL

## 2021-04-13 PROCEDURE — 97110 THERAPEUTIC EXERCISES: CPT

## 2021-04-13 PROCEDURE — 97530 THERAPEUTIC ACTIVITIES: CPT

## 2021-04-13 NOTE — FLOWSHEET NOTE
[facility], [place]         Physical Therapy Daily Treatment Note  Date:  2021    Patient Name:  Juan Francisco Downing    :  1980  MRN: [id number]  Medical/Treatment Diagnosis Information:  · Diagnosis: M25.512 (ICD-10-CM) - Acute pain of left shoulder  · Treatment Diagnosis: M25.512 (ICD-10-CM) - Acute pain of left shoulder  Insurance/Certification information:  PT Insurance Information: [place]  Physician Information:  Referring Practitioner: Alex Hernández  Has the plan of care been signed (Y/N):        []  Yes  [x]  No     Date of Patient follow up with Physician: 21      Is this a Progress Report:     []  Yes  [x]  No        Progress report will be due (10 Rx or 30 days whichever is less): 88      Recertification will be due (POC Duration  / 90 days whichever is less):         Visit # Insurance Allowable Auth Required   11 30 []  Yes []  No      Functional Scale: UEFI 39% dis    Date assessed:  3/23/21  Functional Scale: UEFI 100% dis    Date assessed:  21      Latex Allergy:  [x]NO      []YES  Preferred Language for Healthcare:   [x]English       []other:    Pain level:  1/10     SUBJECTIVE:  Pt is 8 week s/p. Pt notes that he feels he is doing alright, about 65% back to normal. Still feels like ROM is limiting him.      OBJECTIVE:   Observation:    Test measurements:      ROM PROM AROM  Comment    L 21    R L R    Flexion 150 wall slides  155 supine     Abduction 153 wall slides  120 supine      ER 91 cane        IR T8 behind back rope   65      Other        Other             Strength: deferred  L R Comment   Flexion      Abduction      ER      IR      Supraspinatus      Upper Trap      Lower Trap      Mid Trap      Rhomboids      Biceps      Triceps      Horizontal Abduction      Horizontal Adduction      Lats          RESTRICTIONS/PRECAUTIONS:       Exercises:  Exercise/Equipment Resistance Repetitions Other comments   Stretching/PROM      Wand  ER supine 10x10    scaption standing 10x:10       Wall slides  Flex, scaption 10x10       CBA at EOB  10x10     Sleeper IR   10x10     IR BB   10x10 rope     T position and arms at side   pec doorway  10x10 90-90          PROM Elbow      Isometrics      Retraction            Weight shift      Flexion      Abduction         Internal Rotation      Biceps      Triceps            PRE's      Flexion      Abduction         Internal Rotation      Shrugs      EXT      Reverse Flys      Serratus  3x10 on wall     Horizontal Abd with ER      Biceps  3x10 6# ECL    Triceps      Retraction  Prone T LUE 3x10     scaption   2# 3x10     Cable Column/Theraband      External Rotation  3x10 red 90-90     Internal Rotation  3x10 black -90-90     Shrugs      Lats      Ext  3x10 black    Flex      Scapular Retraction  3x10 black    BIC      TRIC      PNF  d2 flex/ext 3x10 2#          Dynamic Stability      tball pushup   [id number]    BB IR/ER  1x53uue     BOW  30x3 90-90 dribble     [employer]                      Access Code: [email or web address]  URL: [email or web address]  Date: 04/01/2021  Prepared by: Juan Francisco Perez    Exercises  Doorway Pec Stretch at 90 Degrees Abduction - 1 x daily - 7 x weekly - 3 sets - 30 hold  Standing Single Shoulder Flexion Wall Slide with Palm Up - 1 x daily - 7 x weekly - 10 reps - 10 hold  Standing Shoulder Abduction Slides at Wall - 1 x daily - 7 x weekly - 10 reps - 10 hold  Standing Shoulder Internal Rotation Stretch with Towel - 1 x daily - 7 x weekly - 10 reps - 10 hold  Shoulder Scaption AAROM with Dowel - 1 x daily - 7 x weekly - 10 reps - 10 hold  Supine Shoulder External Rotation in 45 Degrees Abduction AAROM with Dowel - 1 x daily - 7 x weekly - 10 reps - 10 hold  Scapular Retraction with Resistance - 1 x daily - 7 x weekly - 10 reps - 3 sets  Scapular Retraction with Resistance Advanced - 1 x daily - 7 x weekly - 10 reps - 3 sets  Shoulder External Rotation with Anchored Resistance - 1 x daily - 7 x weekly - 10 reps - 3 sets  Shoulder Internal Rotation with Resistance - 1 x daily - 7 x weekly - 10 reps - 3 sets  Standing Wall Ball Circles with Mini Swiss Ball - 1 x daily - 7 x weekly - 10 reps - 3 sets  Wall Push Up with Plus - 1 x daily - 7 x weekly - 10 reps - 3 sets  Staggered Stance Biceps Curl - 1 x daily - 7 x weekly - 10 reps - 3 sets            Therapeutic Exercise and NMR EXR  [] (11630) Provided verbal/tactile cueing for activities related to strengthening, flexibility, endurance, ROM  for improvements in scapular, scapulothoracic and UE control with self care, reaching, carrying, lifting, house/yardwork, driving/computer work.    [] (36242) Provided verbal/tactile cueing for activities related to improving balance, coordination, kinesthetic sense, posture, motor skill, proprioception  to assist with  scapular, scapulothoracic and UE control with self care, reaching, carrying, lifting, house/yardwork, driving/computer work. Therapeutic Activities:    [] (87188 or 92669) Provided verbal/tactile cueing for activities related to improving balance, coordination, kinesthetic sense, posture, motor skill, proprioception and motor activation to allow for proper function of scapular, scapulothoracic and UE control with self care, carrying, lifting, driving/computer work.      Home Exercise Program:    [x] (81346) Reviewed/Progressed HEP activities related to strengthening, flexibility, endurance, ROM of scapular, scapulothoracic and UE control with self care, reaching, carrying, lifting, house/yardwork, driving/computer work  [] (66874) Reviewed/Progressed HEP activities related to improving balance, coordination, kinesthetic sense, posture, motor skill, proprioception of scapular, scapulothoracic and UE control with self care, reaching, carrying, lifting, house/yardwork, driving/computer work      Manual Treatments:  PROM / STM / Oscillations-Mobs:  G-I, II, III, IV (PA's, Inf., Post.)  [] (99605) Provided manual therapy to mobilize soft tissue/joints of cervical/CT, scapular GHJ and UE for the purpose of modulating pain, promoting relaxation,  increasing ROM, reducing/eliminating soft tissue swelling/inflammation/restriction, improving soft tissue extensibility and allowing for proper ROM for normal function with self care, reaching, carrying, lifting, house/yardwork, driving/computer work        Modalities:  Declined today    Charges:   Timed Code Treatment Minutes: 52'   Total Treatment Minutes: 50'     [] EVAL (LOW) 83236   [] EVAL (MOD) 66726   [] EVAL (HIGH) 62909   [] RE-EVAL   [x] QI(69741) x 1    [] IONTO  [] NMR (11949) x     [] VASO  [] Manual (72344) x      [] Other:  [x] TA x  2    [] Mech Traction (38390)  [] ES(attended) (27666)      [] ES (un) (58445):       GOALS:  Patient stated goal: return use of L arm with less pain     [x] Progressing: [] Met: [] Not Met: [] Adjusted    Therapist goals for Patient:   Short Term Goals: To be achieved in: 2 weeks  1. Independent in HEP and progression per patient tolerance, in order to prevent re-injury. [] Progressing: [x] Met: [] Not Met: [] Adjusted   2. Patient will have a decrease in pain to facilitate improvement in movement, function, and ADLs as indicated by Functional Deficits. [x] Progressing: [] Met: [] Not Met: [] Adjusted    Long Term Goals: To be achieved in: 6-8 weeks  1. Disability index score of 19% or less for the UEFI to assist with reaching prior level of function. [] Progressing: [] Met: [] Not Met: [] Adjusted- to complete NV  2. Patient will demonstrate increased AROM to 150+ flex, 70+ ER, 45+ IR to allow for proper joint functioning as indicated by patients Functional Deficits. [x] Progressing: [] Met: [] Not Met: [] Adjusted  3. Patient will demonstrate an increase in Strength to 4+/5 or greater  to allow for proper functional mobility as indicated by patients Functional Deficits.    [x] Progressing: [] Met: [] Not Met: [] Adjusted  4. Patient will return to ADL such as bathing/grooming/dressing without increased symptoms or restriction. [x] Progressing: [] Met: [] Not Met: [] Adjusted  5. Patient will return to lifting 20lbs or greater to begin return to work progression without increased symptoms or restriction. [x] Progressing: [] Met: [] Not Met: [] Adjusted       Overall Progression Towards Functional goals/ Treatment Progress Update:  [x] Patient is progressing as expected towards functional goals listed. [] Progression is slowed due to complexities/Impairments listed. [] Progression has been slowed due to co-morbidities. [] Plan just implemented, too soon to assess goals progression <30days   [] Goals require adjustment due to lack of progress  [] Patient is not progressing as expected and requires additional follow up with physician  [] Other    Prognosis for POC: [x] Good [] Fair  [] Poor      Patient requires continued skilled intervention: [x] Yes  [] No    Treatment/Activity Tolerance:  [x] Patient able to complete treatment  [] Patient limited by fatigue  [] Patient limited by pain     [] Patient limited by other medical complications  [] Other:  Pt able to progress overhead strengthening today without pain, just fatigue. Continue as tolerated.          Return to Play: (if applicable)   []  Stage 1: Intro to Strength   []  Stage 2: Return to Run and Strength   []  Stage 3: Return to Jump and Strength   []  Stage 4: Dynamic Strength and Agility   []  Stage 5: Sport Specific Training     []  Ready to Return to Play, Meets All Above Stages   []  Not Ready for Return to Sports   Comments:                               PLAN:   [x] Continue per plan of care [] Alter current plan (see comments above)  [] Plan of care initiated [] Hold pending MD visit [] Discharge  Note: If patient does not return for scheduled/ recommended follow up visits, this note will serve as a discharge from care along with most recent update on progress. Reviewed insurance benefits for physical therapy in an outpatient hospital based setting with the patient, including deductible  and allowable visit number.  Pt was informed of possible out of pocket costs, as well as, informed of other service options for continuing supervised sessions without required skilled PT intervention such as the [facility] program.     Electronically signed by:  Ileana Marin, PT, DPT, Cert DN

## 2021-04-15 ENCOUNTER — HOSPITAL ENCOUNTER (OUTPATIENT)
Dept: PHYSICAL THERAPY | Age: 41
Setting detail: THERAPIES SERIES
Discharge: HOME | End: 2021-04-15
Payer: COMMERCIAL

## 2021-04-15 PROCEDURE — 97110 THERAPEUTIC EXERCISES: CPT

## 2021-04-15 PROCEDURE — 97530 THERAPEUTIC ACTIVITIES: CPT

## 2021-04-15 NOTE — PLAN OF CARE
Insurance Information: [employer]  Physician Information:  Referring Practitioner: Fernando Franco  Has the plan of care been signed (Y/N):        []  Yes  [x]  No     Date of Patient follow up with Physician: 4/29/21      Is this a Progress Report:     []  Yes  [x]  No        Progress report will be due (10 Rx or 30 days whichever is less): 9/52/94      Recertification will be due (POC Duration  / 90 days whichever is less):         Visit # Insurance Allowable Auth Required   12 30 []  Yes []  No      Functional Scale: UEFI 17% dis    Date assessed:  4/15/21  Functional Scale: UEFI 39% dis    Date assessed:  3/23/21  Functional Scale: UEFI 100% dis    Date assessed:  2/18/21      Latex Allergy:  [x]NO      []YES  Preferred Language for Healthcare:   [x]English       []other:    Pain level:  1/10     SUBJECTIVE:  Pt is 8 week s/p. Pt notes he felt sore from Tuesdays session yesterday but today is feeling better.      OBJECTIVE: 04/15/21    Observation:    Test measurements:      ROM PROM AROM  Comment    L     R L R    Flexion   163     Abduction 161 wall slides  135; 161 wall slides     ER   93     IR   56     Other        Other             Strength L R Comment   Flexion 4-     Abduction 4-     ER 4     IR 4     Supraspinatus      Upper Trap      Lower Trap      Mid Trap      Rhomboids      Biceps      Triceps      Horizontal Abduction      Horizontal Adduction      Lats          RESTRICTIONS/PRECAUTIONS:       Exercises:  Exercise/Equipment Resistance Repetitions Other comments   Stretching/PROM      Wand  ER supine  10x10    scaption standing 10x:10       Wall slides  Flex, scaption, abduction 10x10       CBA at EOB  10x10     Sleeper IR   10x10     IR BB   10x10 rope     T position and arms at side   pec doorway  10x10 90-90          PROM Elbow      Isometrics      Retraction            Weight shift      Flexion      Abduction         Internal Rotation      Biceps      Triceps            PRE's      Flexion Abduction         Internal Rotation      Shrugs      EXT      Reverse Flys      Serratus  3x10 on wall     Horizontal Abd with ER      Biceps      Triceps      Retraction  Prone T LUE 3x10     scaption   2# 3x10     Cable Column/Theraband      External Rotation  3x10 red 90-90     Internal Rotation  3x10 black -90-90     Shrugs      Lats      Ext  3x10 black    Flex      Scapular Retraction  3x10 black    BIC      TRIC      PNF  d2 flex/ext 3x10 2#          Dynamic Stability      tball pushup   [id number]    BB IR/ER  [id number]     BOW  30x3 90-90 dribble     [employer]                      Access Code: [email or web address]  URL: [email or web address]  Date: 04/01/2021  Prepared by: Olinda Ferris    Exercises  Doorway Pec Stretch at 90 Degrees Abduction - 1 x daily - 7 x weekly - 3 sets - 30 hold  Standing Single Shoulder Flexion Wall Slide with Palm Up - 1 x daily - 7 x weekly - 10 reps - 10 hold  Standing Shoulder Abduction Slides at Wall - 1 x daily - 7 x weekly - 10 reps - 10 hold  Standing Shoulder Internal Rotation Stretch with Towel - 1 x daily - 7 x weekly - 10 reps - 10 hold  Shoulder Scaption AAROM with Dowel - 1 x daily - 7 x weekly - 10 reps - 10 hold  Supine Shoulder External Rotation in 45 Degrees Abduction AAROM with Dowel - 1 x daily - 7 x weekly - 10 reps - 10 hold  Scapular Retraction with Resistance - 1 x daily - 7 x weekly - 10 reps - 3 sets  Scapular Retraction with Resistance Advanced - 1 x daily - 7 x weekly - 10 reps - 3 sets  Shoulder External Rotation with Anchored Resistance - 1 x daily - 7 x weekly - 10 reps - 3 sets  Shoulder Internal Rotation with Resistance - 1 x daily - 7 x weekly - 10 reps - 3 sets  Standing Wall Ball Circles with Mini Swiss Ball - 1 x daily - 7 x weekly - 10 reps - 3 sets  Wall Push Up with Plus - 1 x daily - 7 x weekly - 10 reps - 3 sets  Staggered Stance Biceps Curl - 1 x daily - 7 x weekly - 10 reps - 3 sets            Therapeutic Exercise and NMR EXR  [] (02394) Provided verbal/tactile cueing for activities related to strengthening, flexibility, endurance, ROM  for improvements in scapular, scapulothoracic and UE control with self care, reaching, carrying, lifting, house/yardwork, driving/computer work.    [] (23852) Provided verbal/tactile cueing for activities related to improving balance, coordination, kinesthetic sense, posture, motor skill, proprioception  to assist with  scapular, scapulothoracic and UE control with self care, reaching, carrying, lifting, house/yardwork, driving/computer work. Therapeutic Activities:    [] (31488 or 31127) Provided verbal/tactile cueing for activities related to improving balance, coordination, kinesthetic sense, posture, motor skill, proprioception and motor activation to allow for proper function of scapular, scapulothoracic and UE control with self care, carrying, lifting, driving/computer work.      Home Exercise Program:    [x] (84040) Reviewed/Progressed HEP activities related to strengthening, flexibility, endurance, ROM of scapular, scapulothoracic and UE control with self care, reaching, carrying, lifting, house/yardwork, driving/computer work  [] (15240) Reviewed/Progressed HEP activities related to improving balance, coordination, kinesthetic sense, posture, motor skill, proprioception of scapular, scapulothoracic and UE control with self care, reaching, carrying, lifting, house/yardwork, driving/computer work      Manual Treatments:  PROM / STM / Oscillations-Mobs:  G-I, II, III, IV (PA's, Inf., Post.)  [] (15938) Provided manual therapy to mobilize soft tissue/joints of cervical/CT, scapular GHJ and UE for the purpose of modulating pain, promoting relaxation,  increasing ROM, reducing/eliminating soft tissue swelling/inflammation/restriction, improving soft tissue extensibility and allowing for proper ROM for normal function with self care, reaching, carrying, lifting, house/yardwork, driving/computer work        Modalities:  Declined today    Charges:   Timed Code Treatment Minutes: 52'   Total Treatment Minutes: 50'     [] EVAL (LOW) 455 1011   [] EVAL (MOD) 50966   [] EVAL (HIGH) 42557   [] RE-EVAL   [x] DG(27966) x 1    [] IONTO  [] NMR (25859) x     [] VASO  [] Manual (10834) x      [] Other:  [x] TA x  2    [] Mech Traction (37810)  [] ES(attended) (30923)      [] ES (un) (80253):       GOALS:  Patient stated goal: return use of L arm with less pain     [x] Progressing: [] Met: [] Not Met: [] Adjusted    Therapist goals for Patient:   Short Term Goals: To be achieved in: 2 weeks  1. Independent in HEP and progression per patient tolerance, in order to prevent re-injury. [] Progressing: [x] Met: [] Not Met: [] Adjusted   2. Patient will have a decrease in pain to facilitate improvement in movement, function, and ADLs as indicated by Functional Deficits. [x] Progressing: [] Met: [] Not Met: [] Adjusted    Long Term Goals: To be achieved in: 6-8 weeks  1. Disability index score of 19% or less for the UEFI to assist with reaching prior level of function. [] Progressing: [x] Met: [] Not Met: [] Adjusted- to complete NV  2. Patient will demonstrate increased AROM to 150+ flex, 70+ ER, 45+ IR to allow for proper joint functioning as indicated by patients Functional Deficits. [] Progressing: [x] Met: [] Not Met: [] Adjusted  3. Patient will demonstrate an increase in Strength to 4+/5 or greater  to allow for proper functional mobility as indicated by patients Functional Deficits. [x] Progressing: [] Met: [] Not Met: [] Adjusted  4. Patient will return to ADL such as bathing/grooming/dressing without increased symptoms or restriction. [] Progressing: [x] Met: [] Not Met: [] Adjusted  5. Patient will return to lifting 20lbs or greater to begin return to work progression without increased symptoms or restriction.    [x] Progressing: [] Met: [] Not Met: [] Adjusted       Overall Progression Towards based setting with the patient, including deductible  and allowable visit number.  Pt was informed of possible out of pocket costs, as well as, informed of other service options for continuing supervised sessions without required skilled PT intervention such as the [facility] program.     Electronically signed by:  Olinda Ferris, PT, DPT, Cert DN

## 2021-04-20 ENCOUNTER — HOSPITAL ENCOUNTER (OUTPATIENT)
Dept: PHYSICAL THERAPY | Age: 41
Setting detail: THERAPIES SERIES
Discharge: HOME | End: 2021-04-20
Payer: COMMERCIAL

## 2021-04-20 PROCEDURE — 97110 THERAPEUTIC EXERCISES: CPT

## 2021-04-20 PROCEDURE — 97530 THERAPEUTIC ACTIVITIES: CPT

## 2021-04-20 NOTE — FLOWSHEET NOTE
[facility], [place]    Physical Therapy Re-Certification Plan of Care/MD UPDATE      Physical Therapy Daily Treatment Note  Date:  2021    Patient Name:  Juan Francisco Downing    :  1980  MRN: [id number]  Medical/Treatment Diagnosis Information:  · Diagnosis: M25.512 (ICD-10-CM) - Acute pain of left shoulder  · Treatment Diagnosis: M25.512 (ICD-10-CM) - Acute pain of left shoulder  Insurance/Certification information:  PT Insurance Information: [place]  Physician Information:  Referring Practitioner: Alex Hernández  Has the plan of care been signed (Y/N):        []  Yes  [x]  No     Date of Patient follow up with Physician: 21      Is this a Progress Report:     []  Yes  [x]  No        Progress report will be due (10 Rx or 30 days whichever is less): 3/73/89      Recertification will be due (POC Duration  / 90 days whichever is less):         Visit # Insurance Allowable Auth Required   13 30 []  Yes []  No      Functional Scale: UEFI 17% dis    Date assessed:  4/15/21  Functional Scale: UEFI 39% dis    Date assessed:  3/23/21  Functional Scale: UEFI 100% dis    Date assessed:  21      Latex Allergy:  [x]NO      []YES  Preferred Language for Healthcare:   [x]English       []other:    Pain level:  1/10     SUBJECTIVE:  Pt is 9 week s/p. Pt notes that he is doing well but he does still have tightness lifting into straight abduction with discomfort at superior shoulder.      OBJECTIVE: 04/15/21    Observation:    Test measurements:      ROM PROM AROM  Comment    L     R L R    Flexion   163     Abduction 161 wall slides  135; 161 wall slides     ER   93     IR   56     Other        Other             Strength L R Comment   Flexion 4-     Abduction 4-     ER 4     IR 4     Supraspinatus      Upper Trap      Lower Trap      Mid Trap      Rhomboids      Biceps      Triceps      Horizontal Abduction      Horizontal Adduction      Lats          RESTRICTIONS/PRECAUTIONS: Exercises:  Exercise/Equipment Resistance Repetitions Other comments   Stretching/PROM      Wand  ER supine  10x10    scaption standing 10x:10       Wall slides  Flex, scaption, abduction 10x10       CBA at EOB  10x10     Sleeper IR   10x10     IR BB   10x10 rope     T position and arms at side   pec doorway  10x10 90-90          PROM Elbow      Isometrics      Retraction            Weight shift      Flexion      Abduction         Internal Rotation      Biceps      Triceps            PRE's      Flexion      Abduction         Internal Rotation      Shrugs  3x10 scap depression black band    EXT      Reverse Flys      Serratus  3x10 foam roll up wall     Horizontal Abd with ER      Biceps      Triceps         scaption   3# 3x10     Cable Column/Theraband      External Rotation  3x10 red 90-90     Internal Rotation  3x10 black -90-90     Shrugs      Lats      Ext  3x10 black    Flex      Scapular Retraction  3x10 black    BIC      TRIC      PNF  d2 flex/ext 3x10 3#          Dynamic Stability      tball pushup   [id number]          [employer]                      Access Code: [email or web address]  URL: [email or web address]  Date: 04/01/2021  Prepared by: Jody Myrick    Exercises  Doorway Pec Stretch at 90 Degrees Abduction - 1 x daily - 7 x weekly - 3 sets - 30 hold  Standing Single Shoulder Flexion Wall Slide with Palm Up - 1 x daily - 7 x weekly - 10 reps - 10 hold  Standing Shoulder Abduction Slides at Wall - 1 x daily - 7 x weekly - 10 reps - 10 hold  Standing Shoulder Internal Rotation Stretch with Towel - 1 x daily - 7 x weekly - 10 reps - 10 hold  Shoulder Scaption AAROM with Dowel - 1 x daily - 7 x weekly - 10 reps - 10 hold  Supine Shoulder External Rotation in 45 Degrees Abduction AAROM with Dowel - 1 x daily - 7 x weekly - 10 reps - 10 hold  Scapular Retraction with Resistance - 1 x daily - 7 x weekly - 10 reps - 3 sets  Scapular Retraction with Resistance Advanced - 1 x daily - 7 x weekly - 10 reps - 3 sets  Shoulder External Rotation with Anchored Resistance - 1 x daily - 7 x weekly - 10 reps - 3 sets  Shoulder Internal Rotation with Resistance - 1 x daily - 7 x weekly - 10 reps - 3 sets  Standing Wall [employer] with Mini Swiss Ball - 1 x daily - 7 x weekly - 10 reps - 3 sets  Wall Push Up with Plus - 1 x daily - 7 x weekly - 10 reps - 3 sets  Staggered Stance Biceps Curl - 1 x daily - 7 x weekly - 10 reps - 3 sets            Therapeutic Exercise and NMR EXR  [] (28908) Provided verbal/tactile cueing for activities related to strengthening, flexibility, endurance, ROM  for improvements in scapular, scapulothoracic and UE control with self care, reaching, carrying, lifting, house/yardwork, driving/computer work.    [] (56199) Provided verbal/tactile cueing for activities related to improving balance, coordination, kinesthetic sense, posture, motor skill, proprioception  to assist with  scapular, scapulothoracic and UE control with self care, reaching, carrying, lifting, house/yardwork, driving/computer work. Therapeutic Activities:    [] (30314 or 90728) Provided verbal/tactile cueing for activities related to improving balance, coordination, kinesthetic sense, posture, motor skill, proprioception and motor activation to allow for proper function of scapular, scapulothoracic and UE control with self care, carrying, lifting, driving/computer work.      Home Exercise Program:    [x] (86725) Reviewed/Progressed HEP activities related to strengthening, flexibility, endurance, ROM of scapular, scapulothoracic and UE control with self care, reaching, carrying, lifting, house/yardwork, driving/computer work  [] (33217) Reviewed/Progressed HEP activities related to improving balance, coordination, kinesthetic sense, posture, motor skill, proprioception of scapular, scapulothoracic and UE control with self care, reaching, carrying, lifting, house/yardwork, driving/computer work      Manual Treatments:  PROM / STM / Oscillations-Mobs:  G-I, II, III, IV (PA's, Inf., Post.)  [] (15376) Provided manual therapy to mobilize soft tissue/joints of cervical/CT, scapular GHJ and UE for the purpose of modulating pain, promoting relaxation,  increasing ROM, reducing/eliminating soft tissue swelling/inflammation/restriction, improving soft tissue extensibility and allowing for proper ROM for normal function with self care, reaching, carrying, lifting, house/yardwork, driving/computer work      Inferior joint mobs grade 3/4: 5'    Modalities:  Declined today    Charges:   Timed Code Treatment Minutes: 46'   Total Treatment Minutes: 46'     [] EVAL (LOW) 72115   [] EVAL (MOD) 20228   [] EVAL (HIGH) 11099   [] RE-EVAL   [x] RF(19104) x 1    [] IONTO  [] NMR (92054) x     [] VASO  [] Manual (18685) x      [] Other:  [x] TA x  2    [] Mech Traction (54508)  [] ES(attended) (43208)      [] ES (un) (78695):       GOALS:  Patient stated goal: return use of L arm with less pain     [x] Progressing: [] Met: [] Not Met: [] Adjusted    Therapist goals for Patient:   Short Term Goals: To be achieved in: 2 weeks  1. Independent in HEP and progression per patient tolerance, in order to prevent re-injury. [] Progressing: [x] Met: [] Not Met: [] Adjusted   2. Patient will have a decrease in pain to facilitate improvement in movement, function, and ADLs as indicated by Functional Deficits. [x] Progressing: [] Met: [] Not Met: [] Adjusted    Long Term Goals: To be achieved in: 6-8 weeks  1. Disability index score of 19% or less for the UEFI to assist with reaching prior level of function. [] Progressing: [x] Met: [] Not Met: [] Adjusted- to complete NV  2. Patient will demonstrate increased AROM to 150+ flex, 70+ ER, 45+ IR to allow for proper joint functioning as indicated by patients Functional Deficits. [] Progressing: [x] Met: [] Not Met: [] Adjusted  3.  Patient will demonstrate an increase in Strength to 4+/5 or greater  to allow for proper functional mobility as indicated by patients Functional Deficits. [x] Progressing: [] Met: [] Not Met: [] Adjusted  4. Patient will return to ADL such as bathing/grooming/dressing without increased symptoms or restriction. [] Progressing: [x] Met: [] Not Met: [] Adjusted  5. Patient will return to lifting 20lbs or greater to begin return to work progression without increased symptoms or restriction. [x] Progressing: [] Met: [] Not Met: [] Adjusted       Overall Progression Towards Functional goals/ Treatment Progress Update:  [x] Patient is progressing as expected towards functional goals listed. [] Progression is slowed due to complexities/Impairments listed. [] Progression has been slowed due to co-morbidities. [] Plan just implemented, too soon to assess goals progression <30days   [] Goals require adjustment due to lack of progress  [] Patient is not progressing as expected and requires additional follow up with physician  [] Other    Prognosis for POC: [x] Good [] Fair  [] Poor      Patient requires continued skilled intervention: [x] Yes  [] No    Treatment/Activity Tolerance:  [x] Patient able to complete treatment  [] Patient limited by fatigue  [] Patient limited by pain     [] Patient limited by other medical complications  [] Other:  Pt notes he feels slight less pain with abduction following inferior joint mobs. He was fatigued at end of session with increased overhead activities.        Return to Play: (if applicable)   []  Stage 1: Intro to Strength   []  Stage 2: Return to Run and Strength   []  Stage 3: Return to Jump and Strength   []  Stage 4: Dynamic Strength and Agility   []  Stage 5: Sport Specific Training     []  Ready to Return to Play, Meets All Above Stages   []  Not Ready for Return to Sports   Comments:                               PLAN:   [x] Continue per plan of care [] Alter current plan (see comments above)  [] Plan of care initiated [] Hold pending MD visit [] Discharge  Note: If patient does not return for scheduled/ recommended follow up visits, this note will serve as a discharge from care along with most recent update on progress. Reviewed insurance benefits for physical therapy in an outpatient hospital based setting with the patient, including deductible  and allowable visit number.  Pt was informed of possible out of pocket costs, as well as, informed of other service options for continuing supervised sessions without required skilled PT intervention such as the [facility] program.     Electronically signed by:  Karina Jones, PT, DPT, Cert DN

## 2021-04-22 ENCOUNTER — HOSPITAL ENCOUNTER (OUTPATIENT)
Dept: PHYSICAL THERAPY | Age: 41
Setting detail: THERAPIES SERIES
Discharge: HOME | End: 2021-04-22
Payer: COMMERCIAL

## 2021-04-22 PROCEDURE — 97530 THERAPEUTIC ACTIVITIES: CPT

## 2021-04-22 PROCEDURE — 97110 THERAPEUTIC EXERCISES: CPT

## 2021-04-22 NOTE — FLOWSHEET NOTE
Orthopaedics and Sports Rehabilitation, [facility]    Physical Therapy Re-Certification Plan of Care/MD UPDATE      Physical Therapy Daily Treatment Note  Date:  2021    Patient Name:  Lito Benites    :  1980  MRN: [id number]  Medical/Treatment Diagnosis Information:  · Diagnosis: M25.512 (ICD-10-CM) - Acute pain of left shoulder  · Treatment Diagnosis: M25.512 (ICD-10-CM) - Acute pain of left shoulder  Insurance/Certification information:  PT Insurance Information: [place]  Physician Information:  Referring Practitioner: Pari Pereira  Has the plan of care been signed (Y/N):        []  Yes  [x]  No     Date of Patient follow up with Physician: 21      Is this a Progress Report:     []  Yes  [x]  No        Progress report will be due (10 Rx or 30 days whichever is less):       Recertification will be due (POC Duration  / 90 days whichever is less):         Visit # Insurance Allowable Auth Required   14 30 []  Yes []  No      Functional Scale: UEFI 17% dis    Date assessed:  4/15/21  Functional Scale: UEFI 39% dis    Date assessed:  3/23/21  Functional Scale: UEFI 100% dis    Date assessed:  21      Latex Allergy:  [x]NO      []YES  Preferred Language for Healthcare:   [x]English       []other:    Pain level:  1/10     SUBJECTIVE:  Pt is 9 week s/p. He notes he is doing well, still some fatigue/soreness in the shoulder.      OBJECTIVE: 04/15/21    Observation:    Test measurements:      ROM PROM AROM  Comment    L     R L R    Flexion   163     Abduction 161 wall slides  135; 161 wall slides     ER   93     IR   56     Other        Other             Strength L R Comment   Flexion 4-     Abduction 4-     ER 4     IR 4     Supraspinatus      Upper Trap      Lower Trap      Mid Trap      Rhomboids      Biceps      Triceps      Horizontal Abduction      Horizontal Adduction      Lats          RESTRICTIONS/PRECAUTIONS:       Exercises:  Exercise/Equipment Resistance Repetitions Other comments Stretching/PROM      Wand  ER supine  10x10    scaption standing 10x:10       Wall slides  Flex, scaption, abduction 10x10       CBA at EOB  10x10     Sleeper IR   10x10     IR BB   10x10 rope     T position and arms at side   pec doorway  10x10 90-90          PROM Elbow      Isometrics      Retraction            Weight shift      Flexion      Abduction         Internal Rotation      Biceps      Triceps            PRE's      Flexion      Abduction         Internal Rotation      Shrugs  3x10 scap depression black band    EXT      Reverse Flys      Serratus  3x10 foam roll up wall     Horizontal Abd with ER      Biceps      Triceps         scaption   3# 3x10     Cable Column/Theraband      External Rotation  3x10 red 90-90     Internal Rotation  3x10 black -90-90     Shrugs      Lats      Ext  3x10 black    Flex      Scapular Retraction  3x10 black    BIC      TRIC               Dynamic Stability      tball pushup   [id number]       BOW  30x3 90-90 dribble     Plyoback                      Access Code: [email or web address]  URL: [email or web address]  Date: 04/01/2021  Prepared by: Karina Jones    Exercises  Doorway Pec Stretch at 90 Degrees Abduction - 1 x daily - 7 x weekly - 3 sets - 30 hold  Standing Single Shoulder Flexion Wall Slide with Palm Up - 1 x daily - 7 x weekly - 10 reps - 10 hold  Standing Shoulder Abduction Slides at Wall - 1 x daily - 7 x weekly - 10 reps - 10 hold  Standing Shoulder Internal Rotation Stretch with Towel - 1 x daily - 7 x weekly - 10 reps - 10 hold  Shoulder Scaption AAROM with Dowel - 1 x daily - 7 x weekly - 10 reps - 10 hold  Supine Shoulder External Rotation in 45 Degrees Abduction AAROM with Dowel - 1 x daily - 7 x weekly - 10 reps - 10 hold  Scapular Retraction with Resistance - 1 x daily - 7 x weekly - 10 reps - 3 sets  Scapular Retraction with Resistance Advanced - 1 x daily - 7 x weekly - 10 reps - 3 sets  Shoulder External Rotation with Anchored Resistance - 1 x daily - 7 x weekly - 10 reps - 3 sets  Shoulder Internal Rotation with Resistance - 1 x daily - 7 x weekly - 10 reps - 3 sets  Standing Wall Ball Circles with Mini Swiss Ball - 1 x daily - 7 x weekly - 10 reps - 3 sets  Wall Push Up with Plus - 1 x daily - 7 x weekly - 10 reps - 3 sets  Staggered Stance Biceps Curl - 1 x daily - 7 x weekly - 10 reps - 3 sets            Therapeutic Exercise and NMR EXR  [] (61620) Provided verbal/tactile cueing for activities related to strengthening, flexibility, endurance, ROM  for improvements in scapular, scapulothoracic and UE control with self care, reaching, carrying, lifting, house/yardwork, driving/computer work.    [] (47675) Provided verbal/tactile cueing for activities related to improving balance, coordination, kinesthetic sense, posture, motor skill, proprioception  to assist with  scapular, scapulothoracic and UE control with self care, reaching, carrying, lifting, house/yardwork, driving/computer work. Therapeutic Activities:    [] (71198 or 15701) Provided verbal/tactile cueing for activities related to improving balance, coordination, kinesthetic sense, posture, motor skill, proprioception and motor activation to allow for proper function of scapular, scapulothoracic and UE control with self care, carrying, lifting, driving/computer work.      Home Exercise Program:    [x] (41791) Reviewed/Progressed HEP activities related to strengthening, flexibility, endurance, ROM of scapular, scapulothoracic and UE control with self care, reaching, carrying, lifting, house/yardwork, driving/computer work  [] (82947) Reviewed/Progressed HEP activities related to improving balance, coordination, kinesthetic sense, posture, motor skill, proprioception of scapular, scapulothoracic and UE control with self care, reaching, carrying, lifting, house/yardwork, driving/computer work      Manual Treatments:  PROM / STM / Oscillations-Mobs:  G-I, II, III, IV (PA's, Inf., Post.)  [] (42249) Deficits. [x] Progressing: [] Met: [] Not Met: [] Adjusted  4. Patient will return to ADL such as bathing/grooming/dressing without increased symptoms or restriction. [] Progressing: [x] Met: [] Not Met: [] Adjusted  5. Patient will return to lifting 20lbs or greater to begin return to work progression without increased symptoms or restriction. [x] Progressing: [] Met: [] Not Met: [] Adjusted       Overall Progression Towards Functional goals/ Treatment Progress Update:  [x] Patient is progressing as expected towards functional goals listed. [] Progression is slowed due to complexities/Impairments listed. [] Progression has been slowed due to co-morbidities. [] Plan just implemented, too soon to assess goals progression <30days   [] Goals require adjustment due to lack of progress  [] Patient is not progressing as expected and requires additional follow up with physician  [] Other    Prognosis for POC: [x] Good [] Fair  [] Poor      Patient requires continued skilled intervention: [x] Yes  [] No    Treatment/Activity Tolerance:  [x] Patient able to complete treatment  [] Patient limited by fatigue  [] Patient limited by pain     [] Patient limited by other medical complications  [] Other:  Continued with joint mobs prior to activity as pt notes it did feel better on his shoulder after last session. He was able to complete activities without pain. Continue to progress as tolerated.        Return to Play: (if applicable)   []  Stage 1: Intro to Strength   []  Stage 2: Return to Run and Strength   []  Stage 3: Return to Jump and Strength   []  Stage 4: Dynamic Strength and Agility   []  Stage 5: Sport Specific Training     []  Ready to Return to Play, Meets All Above Stages   []  Not Ready for Return to Sports   Comments:                               PLAN:   [x] Continue per plan of care [] Alter current plan (see comments above)  [] Plan of care initiated [] Hold pending MD visit [] Discharge  Note: If patient does not return for scheduled/ recommended follow up visits, this note will serve as a discharge from care along with most recent update on progress. Reviewed insurance benefits for physical therapy in an outpatient hospital based setting with the patient, including deductible  and allowable visit number.  Pt was informed of possible out of pocket costs, as well as, informed of other service options for continuing supervised sessions without required skilled PT intervention such as the cash based [employer] program.     Electronically signed by:  Ileana Marin, PT, DPT, Cert DN

## 2021-04-27 ENCOUNTER — HOSPITAL ENCOUNTER (OUTPATIENT)
Dept: PHYSICAL THERAPY | Age: 41
Setting detail: THERAPIES SERIES
Discharge: HOME | End: 2021-04-27
Payer: COMMERCIAL

## 2021-04-27 PROCEDURE — 97110 THERAPEUTIC EXERCISES: CPT

## 2021-04-27 PROCEDURE — 97530 THERAPEUTIC ACTIVITIES: CPT

## 2021-04-27 NOTE — FLOWSHEET NOTE
Orthopaedics and Sports Rehabilitation, [place]    Physical Therapy Re-Certification Plan of Care/MD UPDATE      Physical Therapy Daily Treatment Note  Date:  2021    Patient Name:  Tj Vasquez    :  1980  MRN: [id number]  Medical/Treatment Diagnosis Information:  · Diagnosis: M25.512 (ICD-10-CM) - Acute pain of left shoulder  · Treatment Diagnosis: M25.512 (ICD-10-CM) - Acute pain of left shoulder  Insurance/Certification information:  PT Insurance Information: [place]  Physician Information:  Referring Practitioner: Marcella Pro  Has the plan of care been signed (Y/N):        []  Yes  [x]  No     Date of Patient follow up with Physician: 21      Is this a Progress Report:     []  Yes  [x]  No        Progress report will be due (10 Rx or 30 days whichever is less):       Recertification will be due (POC Duration  / 90 days whichever is less):         Visit # Insurance Allowable Auth Required   15 30 []  Yes []  No      Functional Scale: UEFI 17% dis    Date assessed:  4/15/21  Functional Scale: UEFI 39% dis    Date assessed:  3/23/21  Functional Scale: UEFI 100% dis    Date assessed:  21      Latex Allergy:  [x]NO      []YES  Preferred Language for Healthcare:   [x]English       []other:    Pain level:  1/10     SUBJECTIVE:  Pt is 9 week s/p. Pt notes that he is feeling better, lifting the arm doesn't bother him as much.      OBJECTIVE: 21    Observation:    Test measurements:      ROM PROM AROM  Comment    L     R L R    Flexion   163     Abduction   159; 165 wall slides     ER   93     IR   56     Other        Other             Strength L R Comment   Flexion 4     Abduction 4     ER 4     IR 4+     Supraspinatus      Upper Trap      Lower Trap      Mid Trap      Rhomboids      Biceps      Triceps      Horizontal Abduction      Horizontal Adduction      Lats          RESTRICTIONS/PRECAUTIONS:       Exercises:  Exercise/Equipment Resistance Repetitions Other comments Stretching/PROM      Wand  ER supine  10x10    scaption standing 10x:10       Wall slides  Flex, scaption, abduction 10x10       CBA at EOB  10x10     Sleeper IR   10x10     IR BB   10x10 rope     T position and arms at side   pec doorway  10x10 90-90          PROM Elbow      Isometrics      Retraction            Weight shift      Flexion      Abduction         Internal Rotation      Biceps      Triceps            PRE's      Flexion      Abduction         Internal Rotation      Shrugs  3x10 scap depression black band    EXT      Reverse Flys      Serratus  3x10 foam roll up wall     Horizontal Abd with ER      Biceps      Triceps         scaption   3# 3x10     Cable Column/Theraband      External Rotation  3x10 red 90-90     Internal Rotation  3x10 black -90-90     Shrugs      Lats      Ext  3x10 black    Flex      Scapular Retraction  3x10 black    BIC      TRIC      PNF  d2 flex/ext 3x10 3#          Dynamic Stability      tball pushup   [id number]       BOW  30x3 90-90 dribble     [employer]                      Access Code: [email or web address]  URL: [email or web address]  Date: 04/01/2021  Prepared by: Orion Ellis    Exercises  Doorway Pec Stretch at 90 Degrees Abduction - 1 x daily - 7 x weekly - 3 sets - 30 hold  Standing Single Shoulder Flexion Wall Slide with Palm Up - 1 x daily - 7 x weekly - 10 reps - 10 hold  Standing Shoulder Abduction Slides at Wall - 1 x daily - 7 x weekly - 10 reps - 10 hold  Standing Shoulder Internal Rotation Stretch with Towel - 1 x daily - 7 x weekly - 10 reps - 10 hold  Shoulder Scaption AAROM with Dowel - 1 x daily - 7 x weekly - 10 reps - 10 hold  Supine Shoulder External Rotation in 45 Degrees Abduction AAROM with Dowel - 1 x daily - 7 x weekly - 10 reps - 10 hold  Scapular Retraction with Resistance - 1 x daily - 7 x weekly - 10 reps - 3 sets  Scapular Retraction with Resistance Advanced - 1 x daily - 7 x weekly - 10 reps - 3 sets  Shoulder External Rotation with Anchored Resistance - 1 x daily - 7 x weekly - 10 reps - 3 sets  Shoulder Internal Rotation with Resistance - 1 x daily - 7 x weekly - 10 reps - 3 sets  Standing Wall Ball Circles with Mini Swiss Ball - 1 x daily - 7 x weekly - 10 reps - 3 sets  Wall Push Up with Plus - 1 x daily - 7 x weekly - 10 reps - 3 sets  Staggered Stance Biceps Curl - 1 x daily - 7 x weekly - 10 reps - 3 sets            Therapeutic Exercise and NMR EXR  [] (32002) Provided verbal/tactile cueing for activities related to strengthening, flexibility, endurance, ROM  for improvements in scapular, scapulothoracic and UE control with self care, reaching, carrying, lifting, house/yardwork, driving/computer work.    [] (61241) Provided verbal/tactile cueing for activities related to improving balance, coordination, kinesthetic sense, posture, motor skill, proprioception  to assist with  scapular, scapulothoracic and UE control with self care, reaching, carrying, lifting, house/yardwork, driving/computer work. Therapeutic Activities:    [] (98462 or 00940) Provided verbal/tactile cueing for activities related to improving balance, coordination, kinesthetic sense, posture, motor skill, proprioception and motor activation to allow for proper function of scapular, scapulothoracic and UE control with self care, carrying, lifting, driving/computer work.      Home Exercise Program:    [x] (10963) Reviewed/Progressed HEP activities related to strengthening, flexibility, endurance, ROM of scapular, scapulothoracic and UE control with self care, reaching, carrying, lifting, house/yardwork, driving/computer work  [] (62976) Reviewed/Progressed HEP activities related to improving balance, coordination, kinesthetic sense, posture, motor skill, proprioception of scapular, scapulothoracic and UE control with self care, reaching, carrying, lifting, house/yardwork, driving/computer work      Manual Treatments:  PROM / STM / Oscillations-Mobs:  G-I, II, III, IV (PA's, Inf., Post.)  [] (64059) Provided manual therapy to mobilize soft tissue/joints of cervical/CT, scapular GHJ and UE for the purpose of modulating pain, promoting relaxation,  increasing ROM, reducing/eliminating soft tissue swelling/inflammation/restriction, improving soft tissue extensibility and allowing for proper ROM for normal function with self care, reaching, carrying, lifting, house/yardwork, driving/computer work          Modalities:  Declined today    Charges:   Timed Code Treatment Minutes: 55'   Total Treatment Minutes: 50'     [] EVAL (LOW) 455 [id number]   [] EVAL (MOD) 47177   [] EVAL (HIGH) 22968   [] RE-EVAL   [x] QY(34232) x 1    [] IONTO  [] NMR (64441) x     [] VASO  [] Manual (18718) x      [] Other:  [x] TA x  2    [] Mech Traction (89398)  [] ES(attended) (63441)      [] ES (un) (05855):       GOALS:  Patient stated goal: return use of L arm with less pain     [x] Progressing: [] Met: [] Not Met: [] Adjusted    Therapist goals for Patient:   Short Term Goals: To be achieved in: 2 weeks  1. Independent in HEP and progression per patient tolerance, in order to prevent re-injury. [] Progressing: [x] Met: [] Not Met: [] Adjusted   2. Patient will have a decrease in pain to facilitate improvement in movement, function, and ADLs as indicated by Functional Deficits. [x] Progressing: [] Met: [] Not Met: [] Adjusted    Long Term Goals: To be achieved in: 6-8 weeks  1. Disability index score of 19% or less for the UEFI to assist with reaching prior level of function. [] Progressing: [x] Met: [] Not Met: [] Adjusted- to complete NV  2. Patient will demonstrate increased AROM to 150+ flex, 70+ ER, 45+ IR to allow for proper joint functioning as indicated by patients Functional Deficits. [] Progressing: [x] Met: [] Not Met: [] Adjusted  3. Patient will demonstrate an increase in Strength to 4+/5 or greater  to allow for proper functional mobility as indicated by patients Functional Deficits.    [x] Progressing: [] Met: [] Not Met: [] Adjusted  4. Patient will return to ADL such as bathing/grooming/dressing without increased symptoms or restriction. [] Progressing: [x] Met: [] Not Met: [] Adjusted  5. Patient will return to lifting 20lbs or greater to begin return to work progression without increased symptoms or restriction. [x] Progressing: [] Met: [] Not Met: [] Adjusted       Overall Progression Towards Functional goals/ Treatment Progress Update:  [x] Patient is progressing as expected towards functional goals listed. [] Progression is slowed due to complexities/Impairments listed. [] Progression has been slowed due to co-morbidities. [] Plan just implemented, too soon to assess goals progression <30days   [] Goals require adjustment due to lack of progress  [] Patient is not progressing as expected and requires additional follow up with physician  [] Other    Prognosis for POC: [x] Good [] Fair  [] Poor      Patient requires continued skilled intervention: [x] Yes  [] No    Treatment/Activity Tolerance:  [x] Patient able to complete treatment  [] Patient limited by fatigue  [] Patient limited by pain     [] Patient limited by other medical complications  [] Other:  Pt shows improved AROM of abduction and improving strength. Pt to see MD, continue with POC as advised.        Return to Play: (if applicable)   []  Stage 1: Intro to Strength   []  Stage 2: Return to Run and Strength   []  Stage 3: Return to Jump and Strength   []  Stage 4: Dynamic Strength and Agility   []  Stage 5: Sport Specific Training     []  Ready to Return to Play, Meets All Above Stages   []  Not Ready for Return to Sports   Comments:                               PLAN:   [x] Continue per plan of care [] Alter current plan (see comments above)  [] Plan of care initiated [] Hold pending MD visit [] Discharge  Note: If patient does not return for scheduled/ recommended follow up visits, this note will serve as a discharge from

## 2021-04-29 ENCOUNTER — HOSPITAL ENCOUNTER (OUTPATIENT)
Dept: PHYSICAL THERAPY | Age: 41
Setting detail: THERAPIES SERIES
Discharge: HOME | End: 2021-04-29
Payer: COMMERCIAL

## 2021-04-29 ENCOUNTER — OFFICE VISIT (OUTPATIENT)
Dept: ORTHOPEDIC SURGERY | Age: 41
End: 2021-04-29

## 2021-04-29 VITALS — HEIGHT: 67 IN | WEIGHT: 215 LBS | BODY MASS INDEX: 33.74 KG/M2 | RESPIRATION RATE: 12 BRPM

## 2021-04-29 DIAGNOSIS — M75.42 SHOULDER IMPINGEMENT, LEFT: Primary | ICD-10-CM

## 2021-04-29 DIAGNOSIS — M75.22 BICEPS TENDONITIS, LEFT: ICD-10-CM

## 2021-04-29 PROCEDURE — 99024 POSTOP FOLLOW-UP VISIT: CPT | Performed by: ORTHOPAEDIC SURGERY

## 2021-04-29 PROCEDURE — 97110 THERAPEUTIC EXERCISES: CPT

## 2021-04-29 PROCEDURE — 97530 THERAPEUTIC ACTIVITIES: CPT

## 2021-04-29 RX ORDER — DICLOFENAC SODIUM 75 MG/1
TABLET, DELAYED RELEASE ORAL
COMMUNITY
Start: 2021-04-13 | End: 2021-06-11

## 2021-04-29 NOTE — FLOWSHEET NOTE
Orthopaedics and Sports Rehabilitation, [facility]    Physical Therapy Re-Certification Plan of Care/MD UPDATE      Physical Therapy Daily Treatment Note  Date:  2021    Patient Name:  Edgardo Cerna    :  1980  MRN: [id number]  Medical/Treatment Diagnosis Information:  · Diagnosis: M25.512 (ICD-10-CM) - Acute pain of left shoulder  · Treatment Diagnosis: M25.512 (ICD-10-CM) - Acute pain of left shoulder  Insurance/Certification information:  PT Insurance Information: [employer]  Physician Information:  Referring Practitioner: Mylene Siemens  Has the plan of care been signed (Y/N):        []  Yes  [x]  No     Date of Patient follow up with Physician: 21      Is this a Progress Report:     []  Yes  [x]  No        Progress report will be due (10 Rx or 30 days whichever is less):       Recertification will be due (POC Duration  / 90 days whichever is less):         Visit # Insurance Allowable Auth Required   16 30 []  Yes []  No      Functional Scale: UEFI 17% dis    Date assessed:  4/15/21  Functional Scale: UEFI 39% dis    Date assessed:  3/23/21  Functional Scale: UEFI 100% dis    Date assessed:  21      Latex Allergy:  [x]NO      []YES  Preferred Language for Healthcare:   [x]English       []other:    Pain level:  1/10     SUBJECTIVE:  Pt is 9 week s/p. Pt saw MD today who gave him a return to work note beginning . He notes he feels his mobility is good but is concerned about lifting heavier weight.        OBJECTIVE: 21    Observation:    Test measurements:      ROM PROM AROM  Comment    L     R L R    Flexion   163     Abduction   159; 165 wall slides     ER   93     IR   56     Other        Other             Strength L R Comment   Flexion 4     Abduction 4     ER 4     IR 4+     Supraspinatus      Upper Trap      Lower Trap      Mid Trap      Rhomboids      Biceps      Triceps      Horizontal Abduction      Horizontal Adduction      Lats          RESTRICTIONS/PRECAUTIONS: Exercises:  Exercise/Equipment Resistance Repetitions Other comments   Stretching/PROM      Wand  ER supine  10x10    scaption standing 10x:10       Wall slides  Flex, scaption, abduction 10x10       CBA at EOB  10x10     Sleeper IR   10x10     IR BB   10x10 rope     T position and arms at side   pec doorway  10x10 90-90          PROM Elbow      Isometrics      Retraction            Weight shift      Flexion      Abduction         Internal Rotation      Biceps      Triceps            PRE's      Flexion      Abduction         Internal Rotation      Shrugs  3x10 scap depression black band    EXT      Reverse Flys      Serratus    3x10 edge of table push     Horizontal Abd with ER      Biceps      Triceps               scaption   3# 3x10     Cable Column/Theraband      External Rotation  3x10 green 90-90     Internal Rotation  3x10 black -90-90     Shrugs      Lats  3x10 8 plates     Ext  4E99 black    Flex      Scapular Retraction  3x10 bent over row 15#     BIC      TRIC      PNF  d2 flex/ext 3x10 3#          Dynamic Stability      tball pushup   [id number]       BOW  30x3 90-90 dribble     Plyoback                      Suitcase carry  20# Kb 3 laps     Box lift   20# from floor to table           DB side to lateral raise to ER to overhead press   2x10 5#     Access Code: [email or web address]  URL: [email or web address]  Date: 04/01/2021  Prepared by: Rodríguez Johnson    Exercises  Doorway Pec Stretch at 90 Degrees Abduction - 1 x daily - 7 x weekly - 3 sets - 30 hold  Standing Single Shoulder Flexion Wall Slide with Palm Up - 1 x daily - 7 x weekly - 10 reps - 10 hold  Standing Shoulder Abduction Slides at Wall - 1 x daily - 7 x weekly - 10 reps - 10 hold  Standing Shoulder Internal Rotation Stretch with Towel - 1 x daily - 7 x weekly - 10 reps - 10 hold  Shoulder Scaption AAROM with Dowel - 1 x daily - 7 x weekly - 10 reps - 10 hold  Supine Shoulder External Rotation in 45 Degrees Abduction AAROM with Dowel - 1 x daily - 7 x weekly - 10 reps - 10 hold  Scapular Retraction with Resistance - 1 x daily - 7 x weekly - 10 reps - 3 sets  Scapular Retraction with Resistance Advanced - 1 x daily - 7 x weekly - 10 reps - 3 sets  Shoulder External Rotation with Anchored Resistance - 1 x daily - 7 x weekly - 10 reps - 3 sets  Shoulder Internal Rotation with Resistance - 1 x daily - 7 x weekly - 10 reps - 3 sets  Standing Wall Ball Circles with Mini Swiss Ball - 1 x daily - 7 x weekly - 10 reps - 3 sets  Wall Push Up with Plus - 1 x daily - 7 x weekly - 10 reps - 3 sets  Staggered Stance Biceps Curl - 1 x daily - 7 x weekly - 10 reps - 3 sets            Therapeutic Exercise and NMR EXR  [] (45806) Provided verbal/tactile cueing for activities related to strengthening, flexibility, endurance, ROM  for improvements in scapular, scapulothoracic and UE control with self care, reaching, carrying, lifting, house/yardwork, driving/computer work.    [] (91323) Provided verbal/tactile cueing for activities related to improving balance, coordination, kinesthetic sense, posture, motor skill, proprioception  to assist with  scapular, scapulothoracic and UE control with self care, reaching, carrying, lifting, house/yardwork, driving/computer work. Therapeutic Activities:    [] (86093 or 68773) Provided verbal/tactile cueing for activities related to improving balance, coordination, kinesthetic sense, posture, motor skill, proprioception and motor activation to allow for proper function of scapular, scapulothoracic and UE control with self care, carrying, lifting, driving/computer work.      Home Exercise Program:    [x] (89507) Reviewed/Progressed HEP activities related to strengthening, flexibility, endurance, ROM of scapular, scapulothoracic and UE control with self care, reaching, carrying, lifting, house/yardwork, driving/computer work  [] (99107) Reviewed/Progressed HEP activities related to improving balance, coordination, kinesthetic sense, posture, motor skill, proprioception of scapular, scapulothoracic and UE control with self care, reaching, carrying, lifting, house/yardwork, driving/computer work      Manual Treatments:  PROM / STM / Oscillations-Mobs:  G-I, II, III, IV (PA's, Inf., Post.)  [] (39768) Provided manual therapy to mobilize soft tissue/joints of cervical/CT, scapular GHJ and UE for the purpose of modulating pain, promoting relaxation,  increasing ROM, reducing/eliminating soft tissue swelling/inflammation/restriction, improving soft tissue extensibility and allowing for proper ROM for normal function with self care, reaching, carrying, lifting, house/yardwork, driving/computer work          Modalities:  Declined today    Charges:   Timed Code Treatment Minutes: 46'   Total Treatment Minutes: 50'     [] EVAL (LOW) 04613   [] EVAL (MOD) 86652   [] EVAL (HIGH) 32840   [] RE-EVAL   [x] VA(92751) x 1    [] IONTO  [] NMR (12171) x     [] VASO  [] Manual (24804) x      [] Other:  [x] TA x  2    [] Mech Traction (20030)  [] ES(attended) (71305)      [] ES (un) (31492):       GOALS:  Patient stated goal: return use of L arm with less pain     [x] Progressing: [] Met: [] Not Met: [] Adjusted    Therapist goals for Patient:   Short Term Goals: To be achieved in: 2 weeks  1. Independent in HEP and progression per patient tolerance, in order to prevent re-injury. [] Progressing: [x] Met: [] Not Met: [] Adjusted   2. Patient will have a decrease in pain to facilitate improvement in movement, function, and ADLs as indicated by Functional Deficits. [x] Progressing: [] Met: [] Not Met: [] Adjusted    Long Term Goals: To be achieved in: 6-8 weeks  1. Disability index score of 19% or less for the UEFI to assist with reaching prior level of function. [] Progressing: [x] Met: [] Not Met: [] Adjusted- to complete NV  2.  Patient will demonstrate increased AROM to 150+ flex, 70+ ER, 45+ IR to allow for proper joint functioning as indicated by patients Functional Deficits. [] Progressing: [x] Met: [] Not Met: [] Adjusted  3. Patient will demonstrate an increase in Strength to 4+/5 or greater  to allow for proper functional mobility as indicated by patients Functional Deficits. [x] Progressing: [] Met: [] Not Met: [] Adjusted  4. Patient will return to ADL such as bathing/grooming/dressing without increased symptoms or restriction. [] Progressing: [x] Met: [] Not Met: [] Adjusted  5. Patient will return to lifting 20lbs or greater to begin return to work progression without increased symptoms or restriction. [x] Progressing: [] Met: [] Not Met: [] Adjusted       Overall Progression Towards Functional goals/ Treatment Progress Update:  [x] Patient is progressing as expected towards functional goals listed. [] Progression is slowed due to complexities/Impairments listed. [] Progression has been slowed due to co-morbidities. [] Plan just implemented, too soon to assess goals progression <30days   [] Goals require adjustment due to lack of progress  [] Patient is not progressing as expected and requires additional follow up with physician  [] Other    Prognosis for POC: [x] Good [] Fair  [] Poor      Patient requires continued skilled intervention: [x] Yes  [] No    Treatment/Activity Tolerance:  [x] Patient able to complete treatment  [] Patient limited by fatigue  [] Patient limited by pain     [] Patient limited by other medical complications  [] Other: progressed lifting/carrying/pulling activities today for return to work duties. continue to progress towards return to work as tolerated.        Return to Play: (if applicable)   []  Stage 1: Intro to Strength   []  Stage 2: Return to Run and Strength   []  Stage 3: Return to Jump and Strength   []  Stage 4: Dynamic Strength and Agility   []  Stage 5: Sport Specific Training     []  Ready to Return to Play, Meets All Above Stages   []  Not Ready for Return to Sports   Comments: PLAN:   [x] Continue per plan of care [] Alter current plan (see comments above)  [] Plan of care initiated [] Hold pending MD visit [] Discharge  Note: If patient does not return for scheduled/ recommended follow up visits, this note will serve as a discharge from care along with most recent update on progress. Reviewed insurance benefits for physical therapy in an outpatient hospital based setting with the patient, including deductible  and allowable visit number.  Pt was informed of possible out of pocket costs, as well as, informed of other service options for continuing supervised sessions without required skilled PT intervention such as the [facility] program.     Electronically signed by:  Trent Cedeño, PT, DPT, Cert DN

## 2021-04-29 NOTE — PROGRESS NOTES
Cathy Davis returns today to follow-up his left shoulder surgery performed 2/17/2021. He is doing well. He reports no pain. He still fatigues with overhead activity. Today, incisions are nicely healed. He has no tenderness. He has 4+/5 strength in all planes. Assessment: Making appropriate progress status post left shoulder surgery. Plan: He will continue with his rehab to build strength. Going to allow him to return to full duty work as of June 1. Follow-up with me on an as-needed basis. Patient's medications, allergies, past medical, surgical, social and family histories were reviewed and updated as appropriate. Relevant review of systems reviewed. This note was created using voice recognition software. It has been proofread, but occasionally errors remain. Please disregard these errors. They will be corrected as they are noted.

## 2021-05-06 ENCOUNTER — HOSPITAL ENCOUNTER (OUTPATIENT)
Dept: PHYSICAL THERAPY | Age: 41
Setting detail: THERAPIES SERIES
Discharge: HOME | End: 2021-05-06
Payer: COMMERCIAL

## 2021-05-06 PROCEDURE — 97530 THERAPEUTIC ACTIVITIES: CPT

## 2021-05-06 PROCEDURE — 97110 THERAPEUTIC EXERCISES: CPT

## 2021-05-06 NOTE — FLOWSHEET NOTE
Orthopaedics and Sports Rehabilitation, [place]    Physical Therapy Re-Certification Plan of Care/MD UPDATE      Physical Therapy Daily Treatment Note  Date:  2021    Patient Name:  Arcelia Alvarado    :  1980  MRN: [id number]  Medical/Treatment Diagnosis Information:  · Diagnosis: M25.512 (ICD-10-CM) - Acute pain of left shoulder  · Treatment Diagnosis: M25.512 (ICD-10-CM) - Acute pain of left shoulder  Insurance/Certification information:  PT Insurance Information: [employer]  Physician Information:  Referring Practitioner: Esperanza Richardson  Has the plan of care been signed (Y/N):        []  Yes  [x]  No     Date of Patient follow up with Physician: 21      Is this a Progress Report:     []  Yes  [x]  No        Progress report will be due (10 Rx or 30 days whichever is less):       Recertification will be due (POC Duration  / 90 days whichever is less):         Visit # Insurance Allowable Auth Required   17 30 []  Yes []  No      Functional Scale: UEFI 17% dis    Date assessed:  4/15/21  Functional Scale: UEFI 39% dis    Date assessed:  3/23/21  Functional Scale: UEFI 100% dis    Date assessed:  21      Latex Allergy:  [x]NO      []YES  Preferred Language for Healthcare:   [x]English       []other:    Pain level:  1/10     SUBJECTIVE:  Pt is 10 week s/p. Pt reports he was sore for the day after last session with progressing overhead exercises but is fine today.    OBJECTIVE: 21    Observation:    Test measurements:      ROM PROM AROM  Comment    L     R L R    Flexion   163     Abduction   159; 165 wall slides     ER   93     IR   56     Other        Other             Strength L R Comment   Flexion 4     Abduction 4     ER 4     IR 4+     Supraspinatus      Upper Trap      Lower Trap      Mid Trap      Rhomboids      Biceps      Triceps      Horizontal Abduction      Horizontal Adduction      Lats          RESTRICTIONS/PRECAUTIONS:       Exercises:  Exercise/Equipment Resistance Repetitions Other comments   Stretching/PROM      Wand  ER supine  10x10    scaption standing 10x:10       Wall slides  Flex, scaption, abduction 10x10       CBA at EOB  10x10     Sleeper IR   10x10     IR BB   10x10 rope     T position and arms at side   pec doorway  10x10 90-90          PROM Elbow      Isometrics      Retraction            Weight shift      Flexion      Abduction         Internal Rotation      Biceps      Triceps            PRE's      Flexion      Abduction         Internal Rotation         EXT      Reverse Flys      Serratus  3x10 foam roll up wall green band      Horizontal Abd with ER      Biceps      Triceps                  Cable Column/Theraband      External Rotation  3x10 green 90-90     Internal Rotation  3x10 black -90-90     Shrugs                  BIC      TRIC               Dynamic Stability      tball pushup   [id number]          Plyoback            Zurcher curl to high pull row 3x10 5#     Scap clock  Green band x5          KB swing   10# kb 3x10     DB side to lateral raise to ER to overhead press   2x10 5#     Access Code: [email or web address]  URL: [email or web address]  Date: 04/01/2021  Prepared by: Olinda Ferris    Exercises  Doorway Pec Stretch at 90 Degrees Abduction - 1 x daily - 7 x weekly - 3 sets - 30 hold  Standing Single Shoulder Flexion Wall Slide with Palm Up - 1 x daily - 7 x weekly - 10 reps - 10 hold  Standing Shoulder Abduction Slides at Wall - 1 x daily - 7 x weekly - 10 reps - 10 hold  Standing Shoulder Internal Rotation Stretch with Towel - 1 x daily - 7 x weekly - 10 reps - 10 hold  Shoulder Scaption AAROM with Dowel - 1 x daily - 7 x weekly - 10 reps - 10 hold  Supine Shoulder External Rotation in 45 Degrees Abduction AAROM with Dowel - 1 x daily - 7 x weekly - 10 reps - 10 hold  Scapular Retraction with Resistance - 1 x daily - 7 x weekly - 10 reps - 3 sets  Scapular Retraction with Resistance Advanced - 1 x daily - 7 x weekly - 10 reps - 3 sets  Shoulder External Rotation with Anchored Resistance - 1 x daily - 7 x weekly - 10 reps - 3 sets  Shoulder Internal Rotation with Resistance - 1 x daily - 7 x weekly - 10 reps - 3 sets  Standing Wall [employer] with Mini Swiss Ball - 1 x daily - 7 x weekly - 10 reps - 3 sets  Wall Push Up with Plus - 1 x daily - 7 x weekly - 10 reps - 3 sets  Staggered Stance Biceps Curl - 1 x daily - 7 x weekly - 10 reps - 3 sets            Therapeutic Exercise and NMR EXR  [] (80324) Provided verbal/tactile cueing for activities related to strengthening, flexibility, endurance, ROM  for improvements in scapular, scapulothoracic and UE control with self care, reaching, carrying, lifting, house/yardwork, driving/computer work.    [] (12626) Provided verbal/tactile cueing for activities related to improving balance, coordination, kinesthetic sense, posture, motor skill, proprioception  to assist with  scapular, scapulothoracic and UE control with self care, reaching, carrying, lifting, house/yardwork, driving/computer work. Therapeutic Activities:    [] (40120 or 44285) Provided verbal/tactile cueing for activities related to improving balance, coordination, kinesthetic sense, posture, motor skill, proprioception and motor activation to allow for proper function of scapular, scapulothoracic and UE control with self care, carrying, lifting, driving/computer work.      Home Exercise Program:    [x] (79643) Reviewed/Progressed HEP activities related to strengthening, flexibility, endurance, ROM of scapular, scapulothoracic and UE control with self care, reaching, carrying, lifting, house/yardwork, driving/computer work  [] (34192) Reviewed/Progressed HEP activities related to improving balance, coordination, kinesthetic sense, posture, motor skill, proprioception of scapular, scapulothoracic and UE control with self care, reaching, carrying, lifting, house/yardwork, driving/computer work      Manual Treatments:  PROM / STM / by patients Functional Deficits. [x] Progressing: [] Met: [] Not Met: [] Adjusted  4. Patient will return to ADL such as bathing/grooming/dressing without increased symptoms or restriction. [] Progressing: [x] Met: [] Not Met: [] Adjusted  5. Patient will return to lifting 20lbs or greater to begin return to work progression without increased symptoms or restriction. [x] Progressing: [] Met: [] Not Met: [] Adjusted       Overall Progression Towards Functional goals/ Treatment Progress Update:  [x] Patient is progressing as expected towards functional goals listed. [] Progression is slowed due to complexities/Impairments listed. [] Progression has been slowed due to co-morbidities. [] Plan just implemented, too soon to assess goals progression <30days   [] Goals require adjustment due to lack of progress  [] Patient is not progressing as expected and requires additional follow up with physician  [] Other    Prognosis for POC: [x] Good [] Fair  [] Poor      Patient requires continued skilled intervention: [x] Yes  [] No    Treatment/Activity Tolerance:  [x] Patient able to complete treatment  [] Patient limited by fatigue  [] Patient limited by pain     [] Patient limited by other medical complications  [] Other: pt able to progress lifting/overhead activities to prep for return to work. He was fatigued but no pain in shoulder. Continue to progress as tolerated.        Return to Play: (if applicable)   []  Stage 1: Intro to Strength   []  Stage 2: Return to Run and Strength   []  Stage 3: Return to Jump and Strength   []  Stage 4: Dynamic Strength and Agility   []  Stage 5: Sport Specific Training     []  Ready to Return to Play, Meets All Above Stages   []  Not Ready for Return to Sports   Comments:                               PLAN:   [x] Continue per plan of care [] Alter current plan (see comments above)  [] Plan of care initiated [] Hold pending MD visit [] Discharge  Note: If patient does not return for scheduled/ recommended follow up visits, this note will serve as a discharge from care along with most recent update on progress. Reviewed insurance benefits for physical therapy in an outpatient hospital based setting with the patient, including deductible  and allowable visit number.  Pt was informed of possible out of pocket costs, as well as, informed of other service options for continuing supervised sessions without required skilled PT intervention such as the cash based [employer] program.     Electronically signed by:  Naomy Santos, PT, DPT, Cert DN

## 2021-05-11 ENCOUNTER — HOSPITAL ENCOUNTER (OUTPATIENT)
Dept: PHYSICAL THERAPY | Age: 41
Setting detail: THERAPIES SERIES
Discharge: HOME | End: 2021-05-11
Payer: COMMERCIAL

## 2021-05-11 PROCEDURE — 97530 THERAPEUTIC ACTIVITIES: CPT

## 2021-05-11 PROCEDURE — 97110 THERAPEUTIC EXERCISES: CPT

## 2021-05-11 NOTE — FLOWSHEET NOTE
[facility], [place]    Physical Therapy Re-Certification Plan of Care/MD UPDATE      Physical Therapy Daily Treatment Note  Date:  2021    Patient Name:  Kyle Barkley    :  1980  MRN: [id number]  Medical/Treatment Diagnosis Information:  · Diagnosis: M25.512 (ICD-10-CM) - Acute pain of left shoulder  · Treatment Diagnosis: M25.512 (ICD-10-CM) - Acute pain of left shoulder  Insurance/Certification information:  PT Insurance Information: [employer]  Physician Information:  Referring Practitioner: Yared Angulo  Has the plan of care been signed (Y/N):        []  Yes  [x]  No     Date of Patient follow up with Physician: 21      Is this a Progress Report:     []  Yes  [x]  No        Progress report will be due (10 Rx or 30 days whichever is less):       Recertification will be due (POC Duration  / 90 days whichever is less):         Visit # Insurance Allowable Auth Required   18 30 []  Yes []  No      Functional Scale: UEFI 17% dis    Date assessed:  4/15/21  Functional Scale: UEFI 39% dis    Date assessed:  3/23/21  Functional Scale: UEFI 100% dis    Date assessed:  21      Latex Allergy:  [x]NO      []YES  Preferred Language for Healthcare:   [x]English       []other:    Pain level:  1/10     SUBJECTIVE:  Pt is 11 week s/p. Pt notes no pain in shoulder and feels like he has more mobility.        OBJECTIVE: 21    Observation:    Test measurements:      ROM PROM AROM  Comment    L     R L R    Flexion   163     Abduction   159; 165 wall slides     ER   93     IR   56     Other        Other             Strength L R Comment   Flexion 4     Abduction 4     ER 4     IR 4+     Supraspinatus      Upper Trap      Lower Trap      Mid Trap      Rhomboids      Biceps      Triceps      Horizontal Abduction      Horizontal Adduction      Lats          RESTRICTIONS/PRECAUTIONS:       Exercises:  Exercise/Equipment Resistance Repetitions Other comments   Stretching/PROM Wand  ER supine  10x10    scaption standing 10x:10       Wall slides  Flex, scaption, abduction 10x10       CBA at EOB  10x10     Sleeper IR   10x10     IR BB   10x10 rope     T position and arms at side   pec doorway  10x10 90-90          PROM Elbow      Isometrics      Retraction            Weight shift      Flexion      Abduction         Internal Rotation      Biceps      Triceps            PRE's      Flexion      Abduction         Internal Rotation         EXT      Reverse Flys      Serratus  3x10 foam roll up wall green band      DB bench SA with med ball hold in opp hand   2x10 each side 53 DB and 6# medball     Med ball chest pass fly  2x10 6lb med ball     Biceps      Triceps                  Cable Column/Theraband      External Rotation  3x10 green 90-90     Internal Rotation  3x10 black -90-90     Shrugs                  BIC      TRIC               Dynamic Stability      tball pushup   [id number]          [employer]               Scap clock  Green band x5             DB side to lateral raise to ER to overhead press   2x10 5#     Access Code: [email or web address]  URL: [email or web address]  Date: 04/01/2021  Prepared by: Kandy Kaba    Exercises  Doorway Pec Stretch at 90 Degrees Abduction - 1 x daily - 7 x weekly - 3 sets - 30 hold  Standing Single Shoulder Flexion Wall Slide with Palm Up - 1 x daily - 7 x weekly - 10 reps - 10 hold  Standing Shoulder Abduction Slides at Wall - 1 x daily - 7 x weekly - 10 reps - 10 hold  Standing Shoulder Internal Rotation Stretch with Towel - 1 x daily - 7 x weekly - 10 reps - 10 hold  Shoulder Scaption AAROM with Dowel - 1 x daily - 7 x weekly - 10 reps - 10 hold  Supine Shoulder External Rotation in 45 Degrees Abduction AAROM with Dowel - 1 x daily - 7 x weekly - 10 reps - 10 hold  Scapular Retraction with Resistance - 1 x daily - 7 x weekly - 10 reps - 3 sets  Scapular Retraction with Resistance Advanced - 1 x daily - 7 x weekly - 10 reps - 3 sets  Shoulder External Rotation with Anchored Resistance - 1 x daily - 7 x weekly - 10 reps - 3 sets  Shoulder Internal Rotation with Resistance - 1 x daily - 7 x weekly - 10 reps - 3 sets  Standing Wall [employer] with Mini Swiss Ball - 1 x daily - 7 x weekly - 10 reps - 3 sets  Wall Push Up with Plus - 1 x daily - 7 x weekly - 10 reps - 3 sets  Staggered Stance Biceps Curl - 1 x daily - 7 x weekly - 10 reps - 3 sets            Therapeutic Exercise and NMR EXR  [] (07267) Provided verbal/tactile cueing for activities related to strengthening, flexibility, endurance, ROM  for improvements in scapular, scapulothoracic and UE control with self care, reaching, carrying, lifting, house/yardwork, driving/computer work.    [] (87850) Provided verbal/tactile cueing for activities related to improving balance, coordination, kinesthetic sense, posture, motor skill, proprioception  to assist with  scapular, scapulothoracic and UE control with self care, reaching, carrying, lifting, house/yardwork, driving/computer work. Therapeutic Activities:    [] (00308 or 51042) Provided verbal/tactile cueing for activities related to improving balance, coordination, kinesthetic sense, posture, motor skill, proprioception and motor activation to allow for proper function of scapular, scapulothoracic and UE control with self care, carrying, lifting, driving/computer work.      Home Exercise Program:    [x] (52502) Reviewed/Progressed HEP activities related to strengthening, flexibility, endurance, ROM of scapular, scapulothoracic and UE control with self care, reaching, carrying, lifting, house/yardwork, driving/computer work  [] (06431) Reviewed/Progressed HEP activities related to improving balance, coordination, kinesthetic sense, posture, motor skill, proprioception of scapular, scapulothoracic and UE control with self care, reaching, carrying, lifting, house/yardwork, driving/computer work      Manual Treatments:  PROM / STM / Oscillations-Mobs:  G-I, II, III, IV (Jeffrey, Inf., Post.)  [] (79021) Provided manual therapy to mobilize soft tissue/joints of cervical/CT, scapular GHJ and UE for the purpose of modulating pain, promoting relaxation,  increasing ROM, reducing/eliminating soft tissue swelling/inflammation/restriction, improving soft tissue extensibility and allowing for proper ROM for normal function with self care, reaching, carrying, lifting, house/yardwork, driving/computer work          Modalities:  Declined today    Charges:   Timed Code Treatment Minutes: 55'   Total Treatment Minutes: 50'     [] EVAL (LOW) 15094   [] EVAL (MOD) 47032   [] EVAL (HIGH) 03948   [] RE-EVAL   [x] NC(13320) x 1    [] IONTO  [] NMR (48526) x     [] VASO  [] Manual (75813) x      [] Other:  [x] TA x  2    [] Mech Traction (25822)  [] ES(attended) (18035)      [] ES (un) (59270):       GOALS:  Patient stated goal: return use of L arm with less pain     [x] Progressing: [] Met: [] Not Met: [] Adjusted    Therapist goals for Patient:   Short Term Goals: To be achieved in: 2 weeks  1. Independent in HEP and progression per patient tolerance, in order to prevent re-injury. [] Progressing: [x] Met: [] Not Met: [] Adjusted   2. Patient will have a decrease in pain to facilitate improvement in movement, function, and ADLs as indicated by Functional Deficits. [x] Progressing: [] Met: [] Not Met: [] Adjusted    Long Term Goals: To be achieved in: 6-8 weeks  1. Disability index score of 19% or less for the UEFI to assist with reaching prior level of function. [] Progressing: [x] Met: [] Not Met: [] Adjusted- to complete NV  2. Patient will demonstrate increased AROM to 150+ flex, 70+ ER, 45+ IR to allow for proper joint functioning as indicated by patients Functional Deficits. [] Progressing: [x] Met: [] Not Met: [] Adjusted  3.  Patient will demonstrate an increase in Strength to 4+/5 or greater  to allow for proper functional mobility as indicated by patients Functional Deficits. [x] Progressing: [] Met: [] Not Met: [] Adjusted  4. Patient will return to ADL such as bathing/grooming/dressing without increased symptoms or restriction. [] Progressing: [x] Met: [] Not Met: [] Adjusted  5. Patient will return to lifting 20lbs or greater to begin return to work progression without increased symptoms or restriction. [x] Progressing: [] Met: [] Not Met: [] Adjusted       Overall Progression Towards Functional goals/ Treatment Progress Update:  [x] Patient is progressing as expected towards functional goals listed. [] Progression is slowed due to complexities/Impairments listed. [] Progression has been slowed due to co-morbidities. [] Plan just implemented, too soon to assess goals progression <30days   [] Goals require adjustment due to lack of progress  [] Patient is not progressing as expected and requires additional follow up with physician  [] Other    Prognosis for POC: [x] Good [] Fair  [] Poor      Patient requires continued skilled intervention: [x] Yes  [] No    Treatment/Activity Tolerance:  [x] Patient able to complete treatment  [] Patient limited by fatigue  [] Patient limited by pain     [] Patient limited by other medical complications  [] Other: pt able to progress lifting/overhead activities to prep for return to work. He was fatigued and noticed he still has more stability in R than in L shoulder but no pain.        Return to Play: (if applicable)   []  Stage 1: Intro to Strength   []  Stage 2: Return to Run and Strength   []  Stage 3: Return to Jump and Strength   []  Stage 4: Dynamic Strength and Agility   []  Stage 5: Sport Specific Training     []  Ready to Return to Play, Meets All Above Stages   []  Not Ready for Return to Sports   Comments:                               PLAN:   [x] Continue per plan of care [] Alter current plan (see comments above)  [] Plan of care initiated [] Hold pending MD visit [] Discharge  Note: If patient does not return for scheduled/ recommended follow up visits, this note will serve as a discharge from care along with most recent update on progress. Reviewed insurance benefits for physical therapy in an outpatient hospital based setting with the patient, including deductible  and allowable visit number.  Pt was informed of possible out of pocket costs, as well as, informed of other service options for continuing supervised sessions without required skilled PT intervention such as the cash based [employer] program.     Electronically signed by:  Karina Jones, PT, DPT, Cert DN

## 2021-05-13 ENCOUNTER — HOSPITAL ENCOUNTER (OUTPATIENT)
Dept: PHYSICAL THERAPY | Age: 41
Setting detail: THERAPIES SERIES
Discharge: HOME | End: 2021-05-13
Payer: COMMERCIAL

## 2021-05-13 PROCEDURE — 97530 THERAPEUTIC ACTIVITIES: CPT

## 2021-05-13 PROCEDURE — 97110 THERAPEUTIC EXERCISES: CPT

## 2021-05-13 NOTE — FLOWSHEET NOTE
[facility], [place]    Physical Therapy Re-Certification Plan of Care/MD UPDATE      Physical Therapy Daily Treatment Note  Date:  2021    Patient Name:  Juan Francisco Downing    :  1980  MRN: [id number]  Medical/Treatment Diagnosis Information:  · Diagnosis: M25.512 (ICD-10-CM) - Acute pain of left shoulder  · Treatment Diagnosis: M25.512 (ICD-10-CM) - Acute pain of left shoulder  Insurance/Certification information:  PT Insurance Information: [place]  Physician Information:  Referring Practitioner: Alex Hernández  Has the plan of care been signed (Y/N):        []  Yes  [x]  No     Date of Patient follow up with Physician: 21      Is this a Progress Report:     []  Yes  [x]  No        Progress report will be due (10 Rx or 30 days whichever is less):       Recertification will be due (POC Duration  / 90 days whichever is less):         Visit # Insurance Allowable Auth Required   18 30 []  Yes []  No      Functional Scale: UEFI 17% dis    Date assessed:  4/15/21  Functional Scale: UEFI 39% dis    Date assessed:  3/23/21  Functional Scale: UEFI 100% dis    Date assessed:  21      Latex Allergy:  [x]NO      []YES  Preferred Language for Healthcare:   [x]English       []other:    Pain level:  0/10     SUBJECTIVE:  Pt is 11 week s/p. pt notes he is doing well, no soreness today       OBJECTIVE: 21    Observation:    Test measurements:      ROM PROM AROM  Comment    L     R L R    Flexion   163     Abduction   159; 165 wall slides     ER   93     IR   56     Other        Other             Strength L R Comment   Flexion 4     Abduction 4     ER 4     IR 4+     Supraspinatus      Upper Trap      Lower Trap      Mid Trap      Rhomboids      Biceps      Triceps      Horizontal Abduction      Horizontal Adduction      Lats          RESTRICTIONS/PRECAUTIONS:       Exercises:  Exercise/Equipment Resistance Repetitions Other comments   Stretching/PROM      Wand  ER supine 10x10    scaption standing 10x:10       Wall slides  Flex, scaption, abduction 10x10       CBA at EOB  10x10     Sleeper IR   10x10     IR BB   10x10 rope     T position and arms at side   pec doorway  10x10 90-90          PROM Elbow      Isometrics      Retraction            Weight shift      Flexion      Abduction         Internal Rotation      Biceps      Triceps            PRE's      Flexion      Abduction         Internal Rotation         EXT      Reverse Flys      Serratus    3x10 edge of table push     DB bench SA with med ball hold in opp hand   2x10 each side 53 DB and 6# medball     Med ball chest pass fly  2x10 6lb med ball     Biceps      Triceps                  Cable Column/Theraband      External Rotation  3x10 green 90-90     Internal Rotation  3x10 black -90-90     Shrugs                  BIC      TRIC               Dynamic Stability      tball pushup   [id number]          [place] carry  2 min 5lb kb     DB clean and press  2x10 8#        Scap clock  Green band x5                Access Code: [email or web address]  URL: [email or web address]  Date: 04/01/2021  Prepared by: Chiquita Morton    Exercises  Doorway Pec Stretch at 90 Degrees Abduction - 1 x daily - 7 x weekly - 3 sets - 30 hold  Standing Single Shoulder Flexion Wall Slide with Palm Up - 1 x daily - 7 x weekly - 10 reps - 10 hold  Standing Shoulder Abduction Slides at Wall - 1 x daily - 7 x weekly - 10 reps - 10 hold  Standing Shoulder Internal Rotation Stretch with Towel - 1 x daily - 7 x weekly - 10 reps - 10 hold  Shoulder Scaption AAROM with Dowel - 1 x daily - 7 x weekly - 10 reps - 10 hold  Supine Shoulder External Rotation in 45 Degrees Abduction AAROM with Dowel - 1 x daily - 7 x weekly - 10 reps - 10 hold  Scapular Retraction with Resistance - 1 x daily - 7 x weekly - 10 reps - 3 sets  Scapular Retraction with Resistance Advanced - 1 x daily - 7 x weekly - 10 reps - 3 sets  Shoulder External Rotation with Anchored Resistance - 1 x daily - 7 x weekly - 10 reps - 3 sets  Shoulder Internal Rotation with Resistance - 1 x daily - 7 x weekly - 10 reps - 3 sets  Standing Wall Ball Circles with Mini Swiss Ball - 1 x daily - 7 x weekly - 10 reps - 3 sets  Wall Push Up with Plus - 1 x daily - 7 x weekly - 10 reps - 3 sets  Staggered Stance Biceps Curl - 1 x daily - 7 x weekly - 10 reps - 3 sets            Therapeutic Exercise and NMR EXR  [] (15758) Provided verbal/tactile cueing for activities related to strengthening, flexibility, endurance, ROM  for improvements in scapular, scapulothoracic and UE control with self care, reaching, carrying, lifting, house/yardwork, driving/computer work.    [] (38557) Provided verbal/tactile cueing for activities related to improving balance, coordination, kinesthetic sense, posture, motor skill, proprioception  to assist with  scapular, scapulothoracic and UE control with self care, reaching, carrying, lifting, house/yardwork, driving/computer work. Therapeutic Activities:    [] (42888 or 92685) Provided verbal/tactile cueing for activities related to improving balance, coordination, kinesthetic sense, posture, motor skill, proprioception and motor activation to allow for proper function of scapular, scapulothoracic and UE control with self care, carrying, lifting, driving/computer work.      Home Exercise Program:    [x] (93494) Reviewed/Progressed HEP activities related to strengthening, flexibility, endurance, ROM of scapular, scapulothoracic and UE control with self care, reaching, carrying, lifting, house/yardwork, driving/computer work  [] (17947) Reviewed/Progressed HEP activities related to improving balance, coordination, kinesthetic sense, posture, motor skill, proprioception of scapular, scapulothoracic and UE control with self care, reaching, carrying, lifting, house/yardwork, driving/computer work      Manual Treatments:  PROM / STM / Oscillations-Mobs:  G-I, II, III, IV (PA's, Inf., Post.)  [] (70066) Provided manual therapy to mobilize soft tissue/joints of cervical/CT, scapular GHJ and UE for the purpose of modulating pain, promoting relaxation,  increasing ROM, reducing/eliminating soft tissue swelling/inflammation/restriction, improving soft tissue extensibility and allowing for proper ROM for normal function with self care, reaching, carrying, lifting, house/yardwork, driving/computer work          Modalities:  Declined today    Charges:   Timed Code Treatment Minutes: 42'   Total Treatment Minutes: 55'     [] EVAL (LOW) 05171   [] EVAL (MOD) 99259   [] EVAL (HIGH) 62537   [] RE-EVAL   [x] GD(97309) x 1    [] IONTO  [] NMR (91446) x     [] VASO  [] Manual (27183) x      [] Other:  [x] TA x  2    [] Mech Traction (22992)  [] ES(attended) (99057)      [] ES (un) (32379):       GOALS:  Patient stated goal: return use of L arm with less pain     [x] Progressing: [] Met: [] Not Met: [] Adjusted    Therapist goals for Patient:   Short Term Goals: To be achieved in: 2 weeks  1. Independent in HEP and progression per patient tolerance, in order to prevent re-injury. [] Progressing: [x] Met: [] Not Met: [] Adjusted   2. Patient will have a decrease in pain to facilitate improvement in movement, function, and ADLs as indicated by Functional Deficits. [x] Progressing: [] Met: [] Not Met: [] Adjusted    Long Term Goals: To be achieved in: 6-8 weeks  1. Disability index score of 19% or less for the UEFI to assist with reaching prior level of function. [] Progressing: [x] Met: [] Not Met: [] Adjusted- to complete NV  2. Patient will demonstrate increased AROM to 150+ flex, 70+ ER, 45+ IR to allow for proper joint functioning as indicated by patients Functional Deficits. [] Progressing: [x] Met: [] Not Met: [] Adjusted  3. Patient will demonstrate an increase in Strength to 4+/5 or greater  to allow for proper functional mobility as indicated by patients Functional Deficits.    [x] Progressing: [] Met: [] Not Met: [] Adjusted  4. Patient will return to ADL such as bathing/grooming/dressing without increased symptoms or restriction. [] Progressing: [x] Met: [] Not Met: [] Adjusted  5. Patient will return to lifting 20lbs or greater to begin return to work progression without increased symptoms or restriction. [x] Progressing: [] Met: [] Not Met: [] Adjusted       Overall Progression Towards Functional goals/ Treatment Progress Update:  [x] Patient is progressing as expected towards functional goals listed. [] Progression is slowed due to complexities/Impairments listed. [] Progression has been slowed due to co-morbidities. [] Plan just implemented, too soon to assess goals progression <30days   [] Goals require adjustment due to lack of progress  [] Patient is not progressing as expected and requires additional follow up with physician  [] Other    Prognosis for POC: [x] Good [] Fair  [] Poor      Patient requires continued skilled intervention: [x] Yes  [] No    Treatment/Activity Tolerance:  [x] Patient able to complete treatment  [] Patient limited by fatigue  [] Patient limited by pain     [] Patient limited by other medical complications  [] Other: pt able to progress lifting/overhead activities to prep for return to work. He was fatigued but no pain in shoulder.        Return to Play: (if applicable)   []  Stage 1: Intro to Strength   []  Stage 2: Return to Run and Strength   []  Stage 3: Return to Jump and Strength   []  Stage 4: Dynamic Strength and Agility   []  Stage 5: Sport Specific Training     []  Ready to Return to Play, Meets All Above Stages   []  Not Ready for Return to Sports   Comments:                               PLAN:   [x] Continue per plan of care [] Alter current plan (see comments above)  [] Plan of care initiated [] Hold pending MD visit [] Discharge  Note: If patient does not return for scheduled/ recommended follow up visits, this note will serve as a discharge from care along with most recent update on progress. Reviewed insurance benefits for physical therapy in an outpatient hospital based setting with the patient, including deductible  and allowable visit number.  Pt was informed of possible out of pocket costs, as well as, informed of other service options for continuing supervised sessions without required skilled PT intervention such as the cash based [employer] program.     Electronically signed by:  Ej Cleveland PT, DPT, Radha SHRESTHA

## 2021-05-18 ENCOUNTER — HOSPITAL ENCOUNTER (OUTPATIENT)
Dept: PHYSICAL THERAPY | Age: 41
Setting detail: THERAPIES SERIES
Discharge: HOME | End: 2021-05-18
Payer: COMMERCIAL

## 2021-05-18 PROCEDURE — 97110 THERAPEUTIC EXERCISES: CPT

## 2021-05-18 NOTE — PLAN OF CARE
[facility]    Physical Therapy Re-Certification Plan of Krystian Del Rio      Dear  Dr. Eligio Camarillo,     We had the pleasure of treating the following patient for physical therapy services at [address] [place] [address]. A summary of our findings can be found in the updated assessment below. This includes our plan of care. If you have any questions or concerns regarding these findings, please do not hesitate to contact me at the office phone number checked above. Thank you for the referral.     Physician Signature:________________________________Date:__________________  By signing above (or electronic signature), therapists plan is approved by physician    Date Range Of Visits: 21-21  Total Visits to Date: 23  Overall Response to Treatment:   []Patient is responding well to treatment and improvement is noted with regards  to goals   []Patient should continue to improve in reasonable time if they continue HEP   []Patient has plateaued and is no longer responding to skilled PT intervention    []Patient is getting worse and would benefit from return to referring MD   []Patient unable to adhere to initial POC   []Other: Pt shows full ROM and strength in LUE. He has returned to ADLs without pain and returns to work with full clearance in 2 weeks. He is independent with HEP and no longer requires skilled intervention. DC PT.           Physical Therapy Daily Treatment Note  Date:  2021    Patient Name:  Vikash Moore    :  1980  MRN: [id number]  Medical/Treatment Diagnosis Information:  · Diagnosis: M25.512 (ICD-10-CM) - Acute pain of left shoulder  · Treatment Diagnosis: M25.512 (ICD-10-CM) - Acute pain of left shoulder  Insurance/Certification information:  PT Insurance Information: [employer]  Physician Information:  Referring Practitioner: Ayad  Has the plan of care been signed (Y/N):        []  Yes  [x]  No     Date of Patient follow up with Physician:       Is this a Progress Report:     []  Yes  [x]  No        Progress report will be due (10 Rx or 30 days whichever is less): 6/64/59      Recertification will be due (POC Duration  / 90 days whichever is less):         Visit # Insurance Allowable Auth Required   19 30 []  Yes []  No      Functional Scale: UEFI 5% dis    Date assessed:  5/18/21  Functional Scale: UEFI 17% dis    Date assessed:  4/15/21  Functional Scale: UEFI 39% dis    Date assessed:  3/23/21  Functional Scale: UEFI 100% dis    Date assessed:  2/18/21      Latex Allergy:  [x]NO      []YES  Preferred Language for Healthcare:   [x]English       []other:    Pain level:  0/10     SUBJECTIVE:  Pt is 11 week s/p. Pt notes he is doing well, has no pain. He started back with going to the gym last week and he starts work in two weeks.        OBJECTIVE: 05/18/21   Observation:    Test measurements:      ROM PROM AROM  Comment    L     R L R    Flexion   170     Abduction   173     ER   90     IR   57     Other        Other             Strength L R Comment   Flexion 4+     Abduction 4+     ER 4+     IR 4+     Supraspinatus      Upper Trap      Lower Trap      Mid Trap      Rhomboids      Biceps      Triceps      Horizontal Abduction      Horizontal Adduction      Lats          RESTRICTIONS/PRECAUTIONS:       Exercises:  Exercise/Equipment Resistance Repetitions Other comments   Stretching/PROM      Wand  ER supine  10x10    scaption standing 10x:10       Wall slides  Flex, scaption, abduction 10x10       CBA at EOB  10x10     Sleeper IR   10x10     IR BB   10x10 rope     T position and arms at side   pec doorway  10x10 90-90          PROM Elbow      Isometrics      Retraction            Weight shift      Flexion      Abduction         Internal Rotation      Biceps      Triceps            PRE's      Flexion      Abduction         Internal Rotation         EXT      Reverse Flys      Serratus    3x10 edge of table push     DB bench SA with med ball hold in opp hand   2x10 each side 53 DB and 6# medball     Med ball chest pass fly  2x10 6lb med ball     Biceps      Triceps                  Cable Column/Theraband      External Rotation  3x10 green 90-90     Internal Rotation  3x10 black -90-90     Shrugs                  BIC      TRIC               Dynamic Stability      tball pushup   [id number]          Statue [place] carry  2 min 5lb kb     DB clean and press  2x10 8#        Scap clock  Green band x5                Access Code: [email or web address]  URL: [email or web address]  Date: 04/01/2021  Prepared by: Olinda Ferris    Exercises  Doorway Pec Stretch at 90 Degrees Abduction - 1 x daily - 7 x weekly - 3 sets - 30 hold  Standing Single Shoulder Flexion Wall Slide with Palm Up - 1 x daily - 7 x weekly - 10 reps - 10 hold  Standing Shoulder Abduction Slides at Wall - 1 x daily - 7 x weekly - 10 reps - 10 hold  Standing Shoulder Internal Rotation Stretch with Towel - 1 x daily - 7 x weekly - 10 reps - 10 hold  Shoulder Scaption AAROM with Dowel - 1 x daily - 7 x weekly - 10 reps - 10 hold  Supine Shoulder External Rotation in 45 Degrees Abduction AAROM with Dowel - 1 x daily - 7 x weekly - 10 reps - 10 hold  Scapular Retraction with Resistance - 1 x daily - 7 x weekly - 10 reps - 3 sets  Scapular Retraction with Resistance Advanced - 1 x daily - 7 x weekly - 10 reps - 3 sets  Shoulder External Rotation with Anchored Resistance - 1 x daily - 7 x weekly - 10 reps - 3 sets  Shoulder Internal Rotation with Resistance - 1 x daily - 7 x weekly - 10 reps - 3 sets  Standing Wall Ball Circles with Mini Swiss Ball - 1 x daily - 7 x weekly - 10 reps - 3 sets  Wall Push Up with Plus - 1 x daily - 7 x weekly - 10 reps - 3 sets  Staggered Stance Biceps Curl - 1 x daily - 7 x weekly - 10 reps - 3 sets            Therapeutic Exercise and NMR EXR  [] (86533) Provided verbal/tactile cueing for activities related to strengthening, flexibility, endurance, ROM  for improvements in scapular, scapulothoracic and UE control with self care, reaching, carrying, lifting, house/yardwork, driving/computer work.    [] (30991) Provided verbal/tactile cueing for activities related to improving balance, coordination, kinesthetic sense, posture, motor skill, proprioception  to assist with  scapular, scapulothoracic and UE control with self care, reaching, carrying, lifting, house/yardwork, driving/computer work. Therapeutic Activities:    [] (24678 or 40046) Provided verbal/tactile cueing for activities related to improving balance, coordination, kinesthetic sense, posture, motor skill, proprioception and motor activation to allow for proper function of scapular, scapulothoracic and UE control with self care, carrying, lifting, driving/computer work.      Home Exercise Program:    [x] (56064) Reviewed/Progressed HEP activities related to strengthening, flexibility, endurance, ROM of scapular, scapulothoracic and UE control with self care, reaching, carrying, lifting, house/yardwork, driving/computer work  [] (03452) Reviewed/Progressed HEP activities related to improving balance, coordination, kinesthetic sense, posture, motor skill, proprioception of scapular, scapulothoracic and UE control with self care, reaching, carrying, lifting, house/yardwork, driving/computer work      Manual Treatments:  PROM / STM / Oscillations-Mobs:  G-I, II, III, IV (PA's, Inf., Post.)  [] (55286) Provided manual therapy to mobilize soft tissue/joints of cervical/CT, scapular GHJ and UE for the purpose of modulating pain, promoting relaxation,  increasing ROM, reducing/eliminating soft tissue swelling/inflammation/restriction, improving soft tissue extensibility and allowing for proper ROM for normal function with self care, reaching, carrying, lifting, house/yardwork, driving/computer work          Modalities:  Declined today    Charges:   Timed Code Treatment Minutes: 16'   Total Treatment Minutes: 16 [] EVAL (LOW) 31550   [] EVAL (MOD) 80879   [] EVAL (HIGH) 54788   [] RE-EVAL   [x] XO(15384) x 1    [] IONTO  [] NMR (88621) x     [] VASO  [] Manual (87815) x      [] Other:  [] TA x      [] Mech Traction (92163)  [] ES(attended) (19171)      [] ES (un) (77781):       GOALS:  Patient stated goal: return use of L arm with less pain     [x] Progressing: [] Met: [] Not Met: [] Adjusted    Therapist goals for Patient:   Short Term Goals: To be achieved in: 2 weeks  1. Independent in HEP and progression per patient tolerance, in order to prevent re-injury. [] Progressing: [x] Met: [] Not Met: [] Adjusted   2. Patient will have a decrease in pain to facilitate improvement in movement, function, and ADLs as indicated by Functional Deficits. [] Progressing: [x] Met: [] Not Met: [] Adjusted    Long Term Goals: To be achieved in: 6-8 weeks  1. Disability index score of 19% or less for the UEFI to assist with reaching prior level of function. [] Progressing: [x] Met: [] Not Met: [] Adjusted- to complete NV  2. Patient will demonstrate increased AROM to 150+ flex, 70+ ER, 45+ IR to allow for proper joint functioning as indicated by patients Functional Deficits. [] Progressing: [x] Met: [] Not Met: [] Adjusted  3. Patient will demonstrate an increase in Strength to 4+/5 or greater  to allow for proper functional mobility as indicated by patients Functional Deficits. [] Progressing: [x] Met: [] Not Met: [] Adjusted  4. Patient will return to ADL such as bathing/grooming/dressing without increased symptoms or restriction. [] Progressing: [x] Met: [] Not Met: [] Adjusted  5. Patient will return to lifting 20lbs or greater to begin return to work progression without increased symptoms or restriction. [] Progressing: [x] Met: [] Not Met: [] Adjusted       Overall Progression Towards Functional goals/ Treatment Progress Update:  [x] Patient is progressing as expected towards functional goals listed.     []

## 2021-05-20 ENCOUNTER — APPOINTMENT (OUTPATIENT)
Dept: PHYSICAL THERAPY | Age: 41
End: 2021-05-20
Payer: COMMERCIAL

## 2021-05-25 ENCOUNTER — APPOINTMENT (OUTPATIENT)
Dept: PHYSICAL THERAPY | Age: 41
End: 2021-05-25
Payer: COMMERCIAL

## 2021-05-27 ENCOUNTER — APPOINTMENT (OUTPATIENT)
Dept: PHYSICAL THERAPY | Age: 41
End: 2021-05-27
Payer: COMMERCIAL

## 2021-06-11 DIAGNOSIS — M54.2 CERVICALGIA: ICD-10-CM

## 2021-06-11 DIAGNOSIS — M25.511 PAIN IN RIGHT SHOULDER: ICD-10-CM

## 2021-06-11 RX ORDER — DICLOFENAC SODIUM 75 MG/1
TABLET, DELAYED RELEASE ORAL
Qty: 60 TABLET | Refills: 3 | Status: SHIPPED | OUTPATIENT
Start: 2021-06-11 | End: 2021-10-18

## 2021-10-18 DIAGNOSIS — M54.2 CERVICALGIA: ICD-10-CM

## 2021-10-18 DIAGNOSIS — M25.511 PAIN IN RIGHT SHOULDER: ICD-10-CM

## 2021-10-18 RX ORDER — DICLOFENAC SODIUM 75 MG/1
TABLET, DELAYED RELEASE ORAL
Qty: 60 TABLET | Refills: 3 | Status: SHIPPED | OUTPATIENT
Start: 2021-10-18 | End: 2022-04-04

## 2022-04-03 DIAGNOSIS — M25.511 PAIN IN RIGHT SHOULDER: ICD-10-CM

## 2022-04-03 DIAGNOSIS — M54.2 CERVICALGIA: ICD-10-CM

## 2022-04-04 RX ORDER — DICLOFENAC SODIUM 75 MG/1
TABLET, DELAYED RELEASE ORAL
Qty: 60 TABLET | Refills: 3 | Status: SHIPPED | OUTPATIENT
Start: 2022-04-04 | End: 2022-08-23

## 2022-08-23 DIAGNOSIS — M25.511 PAIN IN RIGHT SHOULDER: ICD-10-CM

## 2022-08-23 DIAGNOSIS — M54.2 CERVICALGIA: ICD-10-CM

## 2022-08-23 RX ORDER — DICLOFENAC SODIUM 75 MG/1
TABLET, DELAYED RELEASE ORAL
Qty: 60 TABLET | Refills: 3 | Status: SHIPPED | OUTPATIENT
Start: 2022-08-23

## 2022-12-27 DIAGNOSIS — M54.2 CERVICALGIA: ICD-10-CM

## 2022-12-27 DIAGNOSIS — M25.511 PAIN IN RIGHT SHOULDER: ICD-10-CM

## 2023-01-03 RX ORDER — DICLOFENAC SODIUM 75 MG/1
TABLET, DELAYED RELEASE ORAL
Qty: 60 TABLET | Refills: 3 | Status: SHIPPED | OUTPATIENT
Start: 2023-01-03

## 2023-05-11 DIAGNOSIS — M54.2 CERVICALGIA: ICD-10-CM

## 2023-05-11 DIAGNOSIS — M25.511 PAIN IN RIGHT SHOULDER: ICD-10-CM

## 2023-05-11 RX ORDER — DICLOFENAC SODIUM 75 MG/1
TABLET, DELAYED RELEASE ORAL
Qty: 60 TABLET | Refills: 3 | Status: SHIPPED | OUTPATIENT
Start: 2023-05-11

## 2023-08-01 ENCOUNTER — OFFICE VISIT (OUTPATIENT)
Dept: ORTHOPEDIC SURGERY | Age: 43
End: 2023-08-01

## 2023-08-01 VITALS — BODY MASS INDEX: 33.34 KG/M2 | HEIGHT: 68 IN | WEIGHT: 220 LBS

## 2023-08-01 DIAGNOSIS — M25.562 PAIN IN BOTH KNEES, UNSPECIFIED CHRONICITY: Primary | ICD-10-CM

## 2023-08-01 DIAGNOSIS — M70.51 INFRAPATELLAR BURSITIS OF BOTH KNEES: ICD-10-CM

## 2023-08-01 DIAGNOSIS — M25.561 PAIN IN BOTH KNEES, UNSPECIFIED CHRONICITY: Primary | ICD-10-CM

## 2023-08-01 DIAGNOSIS — M17.0 BILATERAL PRIMARY OSTEOARTHRITIS OF KNEE: ICD-10-CM

## 2023-08-01 DIAGNOSIS — M70.52 INFRAPATELLAR BURSITIS OF BOTH KNEES: ICD-10-CM

## 2023-08-01 DIAGNOSIS — M22.41 CHONDROMALACIA OF BOTH PATELLAE: ICD-10-CM

## 2023-08-01 DIAGNOSIS — M65.9 SYNOVITIS OF BOTH KNEE JOINTS: ICD-10-CM

## 2023-08-01 DIAGNOSIS — M22.42 CHONDROMALACIA OF BOTH PATELLAE: ICD-10-CM

## 2023-08-01 PROBLEM — M65.962 SYNOVITIS OF BOTH KNEE JOINTS: Status: ACTIVE | Noted: 2018-10-18

## 2023-08-01 RX ORDER — BUPIVACAINE HYDROCHLORIDE 2.5 MG/ML
4 INJECTION, SOLUTION INFILTRATION; PERINEURAL ONCE
Status: COMPLETED | OUTPATIENT
Start: 2023-08-01 | End: 2023-08-01

## 2023-08-01 RX ORDER — BETAMETHASONE SODIUM PHOSPHATE AND BETAMETHASONE ACETATE 3; 3 MG/ML; MG/ML
24 INJECTION, SUSPENSION INTRA-ARTICULAR; INTRALESIONAL; INTRAMUSCULAR; SOFT TISSUE ONCE
Status: COMPLETED | OUTPATIENT
Start: 2023-08-01 | End: 2023-08-01

## 2023-08-01 RX ORDER — LIDOCAINE HYDROCHLORIDE 10 MG/ML
2 INJECTION, SOLUTION INFILTRATION; PERINEURAL ONCE
Status: COMPLETED | OUTPATIENT
Start: 2023-08-01 | End: 2023-08-01

## 2023-08-01 RX ORDER — DICLOFENAC SODIUM 75 MG/1
75 TABLET, DELAYED RELEASE ORAL 2 TIMES DAILY
Qty: 60 TABLET | Refills: 3 | Status: SHIPPED | OUTPATIENT
Start: 2023-08-01

## 2023-08-01 RX ADMIN — LIDOCAINE HYDROCHLORIDE 2 ML: 10 INJECTION, SOLUTION INFILTRATION; PERINEURAL at 10:34

## 2023-08-01 RX ADMIN — BETAMETHASONE SODIUM PHOSPHATE AND BETAMETHASONE ACETATE 24 MG: 3; 3 INJECTION, SUSPENSION INTRA-ARTICULAR; INTRALESIONAL; INTRAMUSCULAR; SOFT TISSUE at 10:33

## 2023-08-01 RX ADMIN — BUPIVACAINE HYDROCHLORIDE 10 MG: 2.5 INJECTION, SOLUTION INFILTRATION; PERINEURAL at 10:33

## 2023-08-01 NOTE — PROGRESS NOTES
his patellar joint right slightly worse than left. He does not exhibit a great deal of joint line tenderness currently. He does have a trace knee joint effusion currently. Palpation:  He does have some tenderness with retropatellar grind testing but also tenderness over the patellar tendon. Thankfully does not have a great deal of joint line tenderness currently. Rang of Motion:  He is a very reasonable range of motion. He is stiffness noted in the terminal 5 degrees of extension with flexion to about 120 today. Hamstrings are tight. Strength:  He does have plus out of 5 strength knee flexion extension. Special Tests: Short of his pain is reproduced with patellar grind testing. Negative apprehension testing. Some tenderness with palpation of the infrapatellar tendon distally but no palpable defects or tenderness over the pes bursa. No substantial joint line tenderness with negative Ssuana's and stability testing. Skin: There are no rashes, ulcerations or lesions. Distal motor sensory and vascular exam is intact. Gait: Fluid smooth gait. Reflexes:  Symmetrically preserved. Additional Comments: . Additional Examinations      Right lower extremity:  Examination of the right lower extremity does not show any tenderness, deformity or injury. Range of motion is unremarkable. There is no gross instability. There are no rashes, ulcerations or lesions. Strength and tone are normal.  Left lower extremity: Examination of the left lower extremity does not show any tenderness, deformity or injury. Range of motion is unremarkable. There is no gross instability. There are no rashes, ulcerations or lesions.   Strength and tone are normal.         Diagnostic Test Findings:   Bilateral knee AP and PA weightbearing sunrise and lateral films were obtained today and does show mild patellofemoral tilt and arthritic change with reasonably well-maintained articular surface

## 2023-10-10 ENCOUNTER — TELEPHONE (OUTPATIENT)
Dept: ORTHOPEDIC SURGERY | Age: 43
End: 2023-10-10

## 2023-10-10 ENCOUNTER — OFFICE VISIT (OUTPATIENT)
Dept: ORTHOPEDIC SURGERY | Age: 43
End: 2023-10-10

## 2023-10-10 DIAGNOSIS — M25.561 PAIN IN BOTH KNEES, UNSPECIFIED CHRONICITY: ICD-10-CM

## 2023-10-10 DIAGNOSIS — M22.42 CHONDROMALACIA OF BOTH PATELLAE: ICD-10-CM

## 2023-10-10 DIAGNOSIS — R20.0 LEFT LEG NUMBNESS: ICD-10-CM

## 2023-10-10 DIAGNOSIS — M16.12 PRIMARY OSTEOARTHRITIS OF LEFT HIP: ICD-10-CM

## 2023-10-10 DIAGNOSIS — M54.50 LUMBAR PAIN: ICD-10-CM

## 2023-10-10 DIAGNOSIS — M22.41 CHONDROMALACIA OF BOTH PATELLAE: ICD-10-CM

## 2023-10-10 DIAGNOSIS — M54.42 ACUTE LEFT-SIDED LOW BACK PAIN WITH LEFT-SIDED SCIATICA: ICD-10-CM

## 2023-10-10 DIAGNOSIS — M51.36 DDD (DEGENERATIVE DISC DISEASE), LUMBAR: ICD-10-CM

## 2023-10-10 DIAGNOSIS — M17.0 BILATERAL PRIMARY OSTEOARTHRITIS OF KNEE: ICD-10-CM

## 2023-10-10 DIAGNOSIS — M25.552 LEFT HIP PAIN: Primary | ICD-10-CM

## 2023-10-10 DIAGNOSIS — M25.562 PAIN IN BOTH KNEES, UNSPECIFIED CHRONICITY: ICD-10-CM

## 2023-10-10 PROBLEM — M51.369 DDD (DEGENERATIVE DISC DISEASE), LUMBAR: Status: ACTIVE | Noted: 2023-10-10

## 2023-10-10 RX ORDER — DICLOFENAC SODIUM 75 MG/1
75 TABLET, DELAYED RELEASE ORAL 2 TIMES DAILY
Qty: 60 TABLET | Refills: 3 | Status: SHIPPED | OUTPATIENT
Start: 2023-10-10

## 2023-10-10 RX ORDER — METHYLPREDNISOLONE 4 MG/1
TABLET ORAL
Qty: 21 KIT | Refills: 0 | Status: SHIPPED | OUTPATIENT
Start: 2023-10-10

## 2023-10-10 NOTE — PROGRESS NOTES
Chief Complaint    Knee Pain (1 MO FU L KNEE ), Back Pain, and Hip Pain    FU left greater than right and right knee pain with known history of mild osteoarthritis with patellofemoral arthropathy     Initial evaluation worsening left-sided mechanical low back pain left lateral hip and left groin pain with occasional left leg pain with aching    History of Present Illness:  Virginia Brunner is a 38 y.o. male who is a very pleasant active white male  for [employer] which is a fairly manual job is a patient of  Dr Devante Morales who is being seen today upon self-referral for evaluation of an orthopedic condition to his right shoulder and right cervical spine. He has been seen in the past for his mechanical back pain and right knee arthritis and is doing reasonably well with both conditions. He is not complaining of high-grade right radicular symptoms most continue with his home-based exercises. He is being seen today for evaluation of progressively worsening right shoulder pain and stiffness to the right side of the cervical spine. He states that he has had episodic pain with active use his right shoulder for the past 3-4 years but does not recall specific history of actual injury. Since roughly mid March 2019 he has had more consistent pain to his right shoulder superiorly and posteriorly with some pain laterally most associated with overhead activity reaching away from his body as well as reaching cross body internal rotation does feel weak and stiff. He has had a history of popping in his shoulder. He does not recall any specific history of actual injury but once again his job is fairly manual he does do frequent yardwork. He has progressed the point where he is having a consistent achy pain ranges between a 5-9 out of 10 and is having difficulty sleeping. He does have weakness.   He does have soreness and stiffness of the right service cervical spine with occasional episodic

## 2023-10-10 NOTE — TELEPHONE ENCOUNTER
Left voicemail for patient that their MRI has been authorized and that they can call and schedule scan at their convenience. Also told them that they can call and schedule a f/u with Dr. Mohini Ignacio once they have MRI scheduled, leaving at least 2-3 days for our office to receive their results.

## 2023-10-10 NOTE — PATIENT INSTRUCTIONS
Stop diclofenac for now. Take Medrol for the next 6 days. This is a steroid pack. Flip the package over to the foil side and the directions will tell you to start with 6 pills the first day, 5 pills the second day, etc. Please do not take any other anti-inflammatories with the medrol dose marimar as this can upset your stomach. If something else is needed, you may take extra strength tylenol.      Once you are finished with the medrol, then you may re-start your anti-inflammatory: diclofenac 2x/day

## 2023-10-16 ENCOUNTER — HOSPITAL ENCOUNTER (OUTPATIENT)
Dept: PHYSICAL THERAPY | Age: 43
Setting detail: THERAPIES SERIES
Discharge: HOME | End: 2023-10-16
Payer: COMMERCIAL

## 2023-10-16 DIAGNOSIS — M54.50 LUMBAR PAIN: ICD-10-CM

## 2023-10-16 DIAGNOSIS — M25.552 LEFT HIP PAIN: Primary | ICD-10-CM

## 2023-10-16 DIAGNOSIS — R53.1 WEAKNESS: ICD-10-CM

## 2023-10-16 PROCEDURE — 97110 THERAPEUTIC EXERCISES: CPT

## 2023-10-16 PROCEDURE — 97161 PT EVAL LOW COMPLEX 20 MIN: CPT

## 2023-10-16 PROCEDURE — 97112 NEUROMUSCULAR REEDUCATION: CPT

## 2023-10-16 NOTE — FLOWSHEET NOTE
ACTIVITY- use of dynamic activities to improve functional performance. (Ex include squatting, ascending/descending stairs, walking, bending, lifting, catching, throwing, pushing, pulling, jumping.)  Direct, one on one contact, billed in 15-minute increments. (36407) MANUAL THERAPY-  Manual therapy techniques, 1 or more regions, each 15 minutes (Mobilization/manipulation, manual lymphatic drainage, manual traction) for the purpose of modulating pain, promoting relaxation,  increasing ROM, reducing/eliminating soft tissue swelling/inflammation/restriction, improving soft tissue extensibility and allowing for proper ROM for normal function with self care, mobility, lifting and ambulation    TREATMENT PLAN   Plan: POC Initiated today- see eval for details    Electronically Signed by Yudith Santiago PT, DPT, SHERICE             Date: 10/16/2023     Note: If patient does not return for scheduled/recommended follow up visits, this note will serve as a discharge from care along with the most recent update on progress.

## 2023-10-23 ENCOUNTER — HOSPITAL ENCOUNTER (OUTPATIENT)
Dept: PHYSICAL THERAPY | Age: 43
Setting detail: THERAPIES SERIES
Discharge: HOME | End: 2023-10-23
Payer: COMMERCIAL

## 2023-10-23 PROCEDURE — 97110 THERAPEUTIC EXERCISES: CPT

## 2023-10-23 PROCEDURE — 97112 NEUROMUSCULAR REEDUCATION: CPT

## 2023-10-23 NOTE — FLOWSHEET NOTE
- Typically 20 minutes face-to-face)     [x] Neuromusc. Re-ed (61970) 2  [] Re-Eval (34294)     [] Manual ([address])   [] Estim Unattended (86736)     [] Ther. Act (25786)   [] Adriana Lawrence. Traction (90193)     [] Gait (53261)   [] Dry Needle 1-2 muscle (73147)     [] Aquatic Therex (04401)   [] Dry Needle 3+ muscle (04414)     [] Iontophoresis (40073)   [] VASO (72869)     [] Ultrasound (01349)   [] Group Therapy (05912)     [] Estim Attended (70707)   [] Other: Total Timed Code Tx Minutes 55       Total Treatment Minutes 70        Charge Justification:  (77834) THERAPEUTIC EXERCISE - Provided verbal/tactile cueing for activities related to strengthening, flexibility, endurance, ROM performed to prevent loss of range of motion, maintain or improve muscular strength or increase flexibility, following either an injury or surgery. (33239) [address] [facility]- Reviewed/Progressed HEP activities related to strengthening, flexibility, endurance, ROM performed to prevent loss of range of motion, maintain or improve muscular strength or increase flexibility, following either an injury or surgery. (57770) NEUROMUSCULAR RE-EDUCATION- Therapeutic procedure, 1 or more areas, each 15 minutes; neuromuscular reeducation of movement, balance, coordination, kinesthetic sense, posture, and/or proprioception for sitting and/or standing activities  (02438) [address] [facility]- Reviewed/Progressed HEP activities related to neuromuscular reeducation of movement, balance, coordination, kinesthetic sense, posture, and/or proprioception for sitting and/or standing activities    (17379) THERAPEUTIC ACTIVITY- use of dynamic activities to improve functional performance. (Ex include squatting, ascending/descending stairs, walking, bending, lifting, catching, throwing, pushing, pulling, jumping.)  Direct, one on one contact, billed in 15-minute increments.   (38093) MANUAL THERAPY-  Manual therapy techniques, 1 or more regions, each 15 minutes

## 2023-10-25 ENCOUNTER — APPOINTMENT (OUTPATIENT)
Dept: PHYSICAL THERAPY | Age: 43
End: 2023-10-25
Payer: COMMERCIAL

## 2023-10-30 ENCOUNTER — APPOINTMENT (OUTPATIENT)
Dept: PHYSICAL THERAPY | Age: 43
End: 2023-10-30
Payer: COMMERCIAL

## 2023-10-31 ENCOUNTER — OFFICE VISIT (OUTPATIENT)
Dept: ORTHOPEDIC SURGERY | Age: 43
End: 2023-10-31
Payer: COMMERCIAL

## 2023-10-31 VITALS — BODY MASS INDEX: 34.53 KG/M2 | HEIGHT: 67 IN | WEIGHT: 220 LBS

## 2023-10-31 DIAGNOSIS — M25.852 LEFT HIP IMPINGEMENT SYNDROME: ICD-10-CM

## 2023-10-31 DIAGNOSIS — M70.62 GREATER TROCHANTERIC BURSITIS OF LEFT HIP: ICD-10-CM

## 2023-10-31 DIAGNOSIS — M54.50 LUMBAR PAIN: Primary | ICD-10-CM

## 2023-10-31 DIAGNOSIS — M17.0 BILATERAL PRIMARY OSTEOARTHRITIS OF KNEE: ICD-10-CM

## 2023-10-31 DIAGNOSIS — M22.42 CHONDROMALACIA OF BOTH PATELLAE: ICD-10-CM

## 2023-10-31 DIAGNOSIS — M22.41 CHONDROMALACIA OF BOTH PATELLAE: ICD-10-CM

## 2023-10-31 DIAGNOSIS — M54.42 ACUTE LEFT-SIDED LOW BACK PAIN WITH LEFT-SIDED SCIATICA: ICD-10-CM

## 2023-10-31 DIAGNOSIS — M16.12 PRIMARY OSTEOARTHRITIS OF LEFT HIP: ICD-10-CM

## 2023-10-31 DIAGNOSIS — M25.552 LEFT HIP PAIN: ICD-10-CM

## 2023-10-31 DIAGNOSIS — M51.36 DDD (DEGENERATIVE DISC DISEASE), LUMBAR: ICD-10-CM

## 2023-10-31 PROCEDURE — 99213 OFFICE O/P EST LOW 20 MIN: CPT | Performed by: FAMILY MEDICINE

## 2023-10-31 PROCEDURE — 20610 DRAIN/INJ JOINT/BURSA W/O US: CPT | Performed by: FAMILY MEDICINE

## 2023-10-31 RX ORDER — LIDOCAINE HYDROCHLORIDE 10 MG/ML
1 INJECTION, SOLUTION INFILTRATION; PERINEURAL ONCE
Status: COMPLETED | OUTPATIENT
Start: 2023-10-31 | End: 2023-10-31

## 2023-10-31 RX ORDER — BETAMETHASONE SODIUM PHOSPHATE AND BETAMETHASONE ACETATE 3; 3 MG/ML; MG/ML
6 INJECTION, SUSPENSION INTRA-ARTICULAR; INTRALESIONAL; INTRAMUSCULAR; SOFT TISSUE ONCE
Status: COMPLETED | OUTPATIENT
Start: 2023-10-31 | End: 2023-10-31

## 2023-10-31 RX ORDER — BUPIVACAINE HYDROCHLORIDE 2.5 MG/ML
1 INJECTION, SOLUTION INFILTRATION; PERINEURAL ONCE
Status: COMPLETED | OUTPATIENT
Start: 2023-10-31 | End: 2023-10-31

## 2023-10-31 RX ADMIN — LIDOCAINE HYDROCHLORIDE 1 ML: 10 INJECTION, SOLUTION INFILTRATION; PERINEURAL at 08:41

## 2023-10-31 RX ADMIN — BUPIVACAINE HYDROCHLORIDE 2.5 MG: 2.5 INJECTION, SOLUTION INFILTRATION; PERINEURAL at 08:43

## 2023-10-31 RX ADMIN — BETAMETHASONE SODIUM PHOSPHATE AND BETAMETHASONE ACETATE 6 MG: 3; 3 INJECTION, SUSPENSION INTRA-ARTICULAR; INTRALESIONAL; INTRAMUSCULAR; SOFT TISSUE at 08:40

## 2023-10-31 NOTE — PROGRESS NOTES
Chief Complaint    Follow-up (LT Hip MRI done on 10/19/23)    FU left greater than right and right knee pain with known history of mild osteoarthritis with patellofemoral arthropathy     Initial evaluation worsening left-sided mechanical low back pain left lateral hip and left groin pain with occasional left leg pain with aching. Review of left hip imaging    History of Present Illness:  Lina Cline is a 38 y.o. male who is a very pleasant active white male  for [employer] which is a fairly manual job is a patient of  Dr Avi Solis who is being seen today upon self-referral for evaluation of an orthopedic condition to his right shoulder and right cervical spine. He has been seen in the past for his mechanical back pain and right knee arthritis and is doing reasonably well with both conditions. He is not complaining of high-grade right radicular symptoms most continue with his home-based exercises. He is being seen today for evaluation of progressively worsening right shoulder pain and stiffness to the right side of the cervical spine. He states that he has had episodic pain with active use his right shoulder for the past 3-4 years but does not recall specific history of actual injury. Since roughly mid March 2019 he has had more consistent pain to his right shoulder superiorly and posteriorly with some pain laterally most associated with overhead activity reaching away from his body as well as reaching cross body internal rotation does feel weak and stiff. He has had a history of popping in his shoulder. He does not recall any specific history of actual injury but once again his job is fairly manual he does do frequent yardwork. He has progressed the point where he is having a consistent achy pain ranges between a 5-9 out of 10 and is having difficulty sleeping. He does have weakness.   He does have soreness and stiffness of the right service cervical spine with

## 2023-11-01 ENCOUNTER — HOSPITAL ENCOUNTER (OUTPATIENT)
Dept: PHYSICAL THERAPY | Age: 43
Setting detail: THERAPIES SERIES
Discharge: HOME | End: 2023-11-01
Payer: COMMERCIAL

## 2023-11-01 PROCEDURE — 97112 NEUROMUSCULAR REEDUCATION: CPT | Performed by: PHYSICAL THERAPIST

## 2023-11-01 PROCEDURE — 97110 THERAPEUTIC EXERCISES: CPT | Performed by: PHYSICAL THERAPIST

## 2023-11-01 NOTE — FLOWSHEET NOTE
[address] [place] [address] and Therapy [address] [place] [address]., [address], [address] office: [phone] fax: [phone]      Physical Therapy: TREATMENT/PROGRESS NOTE   Patient: Destinee Rivera (85 y.o. male)   Treatment Date: 2023   :  1980 MRN: [id number]   Visit #: 3   Insurance Allowable Auth Needed   30 VPCY []Yes    [x]No    Insurance: Payor: [employer] / Plan: [place] [employer] - [place] PPO / Product Type: *No Product type* /   Insurance ID: [id number] - ([address] [place] [address])  Secondary Insurance (if applicable):    Treatment Diagnosis:     ICD-10-CM    1. Left hip pain  M25.552       2. Lumbar pain  M54.50       3. Weakness  R53.1          Medical Diagnosis:    Left hip pain [M25.552]  Lumbar pain [M54.50]  Primary osteoarthritis of left hip [M16.12]  Acute left-sided low back pain with left-sided sciatica [M54.42]  Left leg numbness [R20.0]  DDD (degenerative disc disease), lumbar [M51.36]   Referring Physician: Janae Cash MD  PCP: Janice Packer MD                             Plan of care signed (Y/N): N    Date of Patient follow up with Physician: 10/19/23     Progress Report/POC: NO  POC update due: 2023 (OR 10 visits /OR [address], whichever is less)    OUTCOME MEASURE DATE % Deficit   WOMAC 10/16/23 47% Deficit            Latex Allergy:  [x]NO      []YES    Preferred Language for Healthcare:   [x]English       []other:        SUBJECTIVE EXAMINATION     Pain: 6 /10    Patient Report/Comments: States he is doing alright. He states he is having some pain today after trick or treating last night up a hill with three kids. He states he saw the MD yesterday, who gave him a cortisone shot in his hip and received his MRI results.          OBJECTIVE EXAMINATION     10/16/23  Flexibility L R Comment   Hamstring 40 deg 50 deg SLR   Gastroc         ITB Moderate WNL Priti's   Quad 1.5 fist 2.5 fist Ely's   Hip Flexor NT NT Gideon      10/16/23  ROM PROM AROM Comment     MARGARET ROBLES R

## 2023-11-08 ENCOUNTER — APPOINTMENT (OUTPATIENT)
Dept: PHYSICAL THERAPY | Age: 43
End: 2023-11-08
Payer: COMMERCIAL

## 2023-11-13 ENCOUNTER — HOSPITAL ENCOUNTER (OUTPATIENT)
Dept: PHYSICAL THERAPY | Age: 43
Setting detail: THERAPIES SERIES
Discharge: HOME | End: 2023-11-13
Payer: COMMERCIAL

## 2023-11-13 PROCEDURE — 97112 NEUROMUSCULAR REEDUCATION: CPT

## 2023-11-13 PROCEDURE — 97110 THERAPEUTIC EXERCISES: CPT

## 2023-11-13 NOTE — FLOWSHEET NOTE
[address] [place] [address] and Therapy [address] [place] [address]., [address], [address] office: [phone] fax: [phone]      Physical Therapy: TREATMENT/PROGRESS NOTE   Patient: Destinee Rivera (73 y.o. male)   Treatment Date: 2023   :  1980 MRN: [id number]   Visit #: 4   Insurance Allowable Auth Needed   30 VPCY []Yes    [x]No    Insurance: Payor: [employer] / Plan: [place] [employer] - [place] PPO / Product Type: *No Product type* /   Insurance ID: [id number] - ([address] [place] [address])  Secondary Insurance (if applicable):    Treatment Diagnosis:     ICD-10-CM    1. Left hip pain  M25.552       2. Lumbar pain  M54.50       3. Weakness  R53.1          Medical Diagnosis:    Left hip pain [M25.552]  Lumbar pain [M54.50]  Primary osteoarthritis of left hip [M16.12]  Acute left-sided low back pain with left-sided sciatica [M54.42]  Left leg numbness [R20.0]  DDD (degenerative disc disease), lumbar [M51.36]   Referring Physician: Janae Cash MD  PCP: Janice Packer MD                             Plan of care signed (Y/N): N    Date of Patient follow up with Physician: 23     Progress Report/POC: NO  POC update due: 2023 (OR 10 visits /OR [address], whichever is less)    OUTCOME MEASURE DATE % Deficit   WOMAC 10/16/23 47% Deficit            Latex Allergy:  [x]NO      []YES    Preferred Language for Healthcare:   [x]English       []other:        SUBJECTIVE EXAMINATION     Pain:  3/10    Patient Report/Comments: His lateral hip pain has improved since cortisone injection in GT bursa. Still taking diclofenac BID. Groin pain is better but still present. Back pain comes and goes. Has very minimal numbness in leg - mostly axial low back pain.          OBJECTIVE EXAMINATION     10/16/23  Flexibility L R Comment   Hamstring 40 deg 50 deg SLR   Gastroc         ITB Moderate WNL Priti's   Quad 1.5 fist 2.5 fist Ely's   Hip Flexor NT NT Gideon      10/16/23  ROM PROM AROM Comment     L R L R     Lumbar normal...

## 2023-11-15 ENCOUNTER — HOSPITAL ENCOUNTER (OUTPATIENT)
Dept: PHYSICAL THERAPY | Age: 43
Setting detail: THERAPIES SERIES
Discharge: HOME | End: 2023-11-15
Payer: COMMERCIAL

## 2023-11-15 PROCEDURE — 97110 THERAPEUTIC EXERCISES: CPT

## 2023-11-15 PROCEDURE — 97112 NEUROMUSCULAR REEDUCATION: CPT

## 2023-11-15 NOTE — FLOWSHEET NOTE
1 x daily - 3 x weekly - 3 sets - 10 reps  - Prone Hip Extension on Table  - 1 x daily - 3 x weekly - 3 sets - 10 reps  - Clamshell with Resistance  - 1 x daily - 3 x weekly - 3 sets - 10 reps  - Supine Active Straight Leg Raise  - 1 x daily - 3 x weekly - 3 sets - 10 reps  - Side Stepping with Resistance at Thighs  - 1 x daily - 3 x weekly - 3 sets - 10 reps  - Forward Monster Walks  - 1 x daily - 3 x weekly - 3 sets - 10 reps  - Standard Plank  - 1 x daily - 3 x weekly - 3 sets - 20 hold  - Side Plank on Elbow  - 1 x daily - 3 x weekly - 3 sets - 20 hold  - Supine 90/90 Alternating Heel Touches with Posterior Pelvic Tilt  - 1 x daily - 3 x weekly - 1-2 sets - 10 reps  - Bird Dog  - 1 x daily - 3 x weekly - 3 sets - 10 reps      ASSESSMENT     Today's Assessment: Continues to do well in PT with fatigue present but no pain reproduction. Added in hip mobility program using rogue bands for MWM of hip joint. Discussed purpose and benefit of doing this at home and how to purchase his own thicker band online. Will plan for re-assessment / PN at [facility]. Continue to progress strengthening program to decrease back and groin pain and allow patient a full return to PLOF. Medical Necessity Documentation:  I certify that this patient meets the below criteria necessary for medical necessity for care and/or justification of therapy services: The patient has a musculoskeletal condition(s) with a corresponding ICD-10 code that is of complexity and severity that require skilled therapeutic intervention. This has a direct and significant impact on the need for therapy and significantly impacts the rate of recovery.      Treatment/Activity Tolerance:  [x] Patient tolerated treatment well [] Patient limited by fatique  [] Patient limited by pain  [] Patient limited by other medical complications  [] Other:       Prognosis for POC: [x] Good [] Fair  [] Poor    Patient requires continued skilled intervention: [x] Yes  [] No      GOALS

## 2023-11-20 ENCOUNTER — HOSPITAL ENCOUNTER (OUTPATIENT)
Dept: PHYSICAL THERAPY | Age: 43
Setting detail: THERAPIES SERIES
Discharge: HOME | End: 2023-11-20
Payer: COMMERCIAL

## 2023-11-20 PROCEDURE — 97112 NEUROMUSCULAR REEDUCATION: CPT

## 2023-11-20 PROCEDURE — 97110 THERAPEUTIC EXERCISES: CPT

## 2023-11-20 NOTE — PLAN OF CARE
Adjusted  Improve ROM to WNL to allow for proper joint functioning as indicated by patients functional deficits. Status: [] Progressing: [x] Met: [] Not Met: [] Adjusted  Pt to improve strength to 4+/5 or better of left hip abduction and left hip extension to allow for proper muscle and joint use in functional mobility, ADLs and prior level of function  Status: [] Progressing: [x] Met: [] Not Met: [] Adjusted  Patient will return to stand for > 15 minutes without increased symptoms or restriction to work towards return to prior level of function. Status: [x] Progressing: [] Met: [] Not Met: [] Adjusted  Patient will be able to manage symptoms while resuming full duty at work. Status: [] Progressing: [x] Met: [] Not Met: [] Adjusted    Overall Progression Towards Functional goals/ Treatment Progress Update:  [x] Patient is progressing as expected towards functional goals listed. [] Progression is slowed due to complexities/Impairments listed. [] Progression has been slowed due to co-morbidities. [] Plan just implemented, too soon (<30days) to assess goals progression   [] Goals require adjustment due to lack of progress  [] Patient is not progressing as expected and requires additional follow up with physician  [] Other:     CHARGE CAPTURE     CHARGE GRID   CPT Code (TIMED) # CPT Code (UNTIMED) #     [x] Therex (84795)  2  [] EVAL:LOW (78856 - Typically 20 minutes face-to-face)     [x] Neuromusc. Re-ed (67528) 2  [] Re-Eval (71585)     [] Manual ([id number])   [] Estim Unattended (08603)     [] Ther. Act (33520)   [] Bryson Lightning.  Traction (99944)     [] Gait (21980)   [] Dry Needle 1-2 muscle (96811)     [] Aquatic Therex (62555)   [] Dry Needle 3+ muscle (91605)     [] Iontophoresis (23627)   [] VASO (96954)     [] Ultrasound (93032)   [] Group Therapy (92562)     [] Estim Attended (46963)   [] Other:

## 2023-11-22 ENCOUNTER — APPOINTMENT (OUTPATIENT)
Dept: PHYSICAL THERAPY | Age: 43
End: 2023-11-22
Payer: COMMERCIAL

## 2023-11-27 ENCOUNTER — HOSPITAL ENCOUNTER (OUTPATIENT)
Dept: PHYSICAL THERAPY | Age: 43
Setting detail: THERAPIES SERIES
Discharge: HOME | End: 2023-11-27
Payer: COMMERCIAL

## 2023-11-27 PROCEDURE — 97110 THERAPEUTIC EXERCISES: CPT

## 2023-11-27 PROCEDURE — 97112 NEUROMUSCULAR REEDUCATION: CPT

## 2023-11-27 NOTE — FLOWSHEET NOTE
[address] [place] [address] and Therapy [address] [place] [address], [address], [address] office: [phone] fax: [phone]        Physical Therapy: TREATMENT/PROGRESS NOTE   Patient: Miles Arango (18 y.o. male)   Treatment Date: 2023   :  1980 MRN: [id number]   Visit #: 7   Insurance Allowable Auth Needed   30 VPCY []Yes    [x]No    Insurance: [facility] / Plan: [facility] [place] PPO / Product Type: *No Product type* /   Insurance ID: [id number] - ([address] [place] [address])  Secondary Insurance (if applicable):    Treatment Diagnosis:     ICD-10-CM    1. Left hip pain  M25.552       2. Lumbar pain  M54.50       3. Weakness  R53.1          Medical Diagnosis:    Left hip pain [M25.552]  Lumbar pain [M54.50]  Primary osteoarthritis of left hip [M16.12]  Acute left-sided low back pain with left-sided sciatica [M54.42]  Left leg numbness [R20.0]  DDD (degenerative disc disease), lumbar [M51.36]   Referring Physician: Eloina Reyes MD  PCP: Micah Vasquez MD                             Plan of care signed (Y/N): Y    Date of Patient follow up with Physician: 23     Progress Report/POC: No  POC update due: 23  (OR 10 visits /OR [address], whichever is less)    OUTCOME MEASURE DATE % Deficit   WOMAC 10/16/23 47% Deficit   WOMAC 23 3% Deficit       Latex Allergy:  [x]NO      []YES    Preferred Language for Healthcare:   [x]English       []other:        SUBJECTIVE EXAMINATION     Pain:  0/10    Patient Report/Comments: Doing well. Mild stiffness in quads this morning. Busy weekend of work around the house but no setbacks or pain to report.          OBJECTIVE EXAMINATION     23  Flexibility L R Comment   Hamstring 60 deg 60 deg SLR   Gastroc         ITB Mild WNL Priti's   Quad 0.5 fist 0.5 fist Ely's   Hip Flexor NT NT Gideon      23  ROM PROM AROM Comment     L R L R     Lumbar Flexion         Mid shin, tightness in back   Lumbar Extension         WNL, mild low

## 2023-11-29 ENCOUNTER — APPOINTMENT (OUTPATIENT)
Dept: PHYSICAL THERAPY | Age: 43
End: 2023-11-29
Payer: COMMERCIAL

## 2023-12-04 ENCOUNTER — HOSPITAL ENCOUNTER (OUTPATIENT)
Dept: PHYSICAL THERAPY | Age: 43
Setting detail: THERAPIES SERIES
Discharge: HOME | End: 2023-12-04
Payer: COMMERCIAL

## 2023-12-04 PROCEDURE — 97112 NEUROMUSCULAR REEDUCATION: CPT

## 2023-12-04 PROCEDURE — 97110 THERAPEUTIC EXERCISES: CPT

## 2023-12-04 NOTE — FLOWSHEET NOTE
as expected and requires additional follow up with physician  [] Other:     CHARGE CAPTURE     CHARGE GRID   CPT Code (TIMED) # CPT Code (UNTIMED) #     [x] Therex (93145)  2  [] EVAL:LOW (20681 - Typically 20 minutes face-to-face)     [x] Neuromusc. Re-ed (11816) 1  [] Re-Eval (23478)     [] Manual (68619)   [] Estim Unattended (38379)     [] Ther. Act (88820)   [] Rebeca Munguia. Traction (04303)     [] Gait (34987)   [] Dry Needle 1-2 muscle (40574)     [] Aquatic Therex (39650)   [] Dry Needle 3+ muscle (09190)     [] Iontophoresis (06467)   [] VASO (98055)     [] Ultrasound (31817)   [] Group Therapy (49510)     [] Estim Attended (47933)   [] Other: Total Timed Code Tx Minutes 50        Total Treatment Minutes 50        Charge Justification:  (49507) THERAPEUTIC EXERCISE - Provided verbal/tactile cueing for activities related to strengthening, flexibility, endurance, ROM performed to prevent loss of range of motion, maintain or improve muscular strength or increase flexibility, following either an injury or surgery. (30919) [facility]- Reviewed/Progressed HEP activities related to strengthening, flexibility, endurance, ROM performed to prevent loss of range of motion, maintain or improve muscular strength or increase flexibility, following either an injury or surgery. (16219) NEUROMUSCULAR RE-EDUCATION- Therapeutic procedure, 1 or more areas, each 15 minutes; neuromuscular reeducation of movement, balance, coordination, kinesthetic sense, posture, and/or proprioception for sitting and/or standing activities  (38957) [address] [facility]- Reviewed/Progressed HEP activities related to neuromuscular reeducation of movement, balance, coordination, kinesthetic sense, posture, and/or proprioception for sitting and/or standing activities    (43976) THERAPEUTIC ACTIVITY- use of dynamic activities to improve functional performance.  (Ex include squatting, ascending/descending stairs, walking, bending, lifting,

## 2023-12-06 ENCOUNTER — APPOINTMENT (OUTPATIENT)
Dept: PHYSICAL THERAPY | Age: 43
End: 2023-12-06
Payer: COMMERCIAL

## 2023-12-07 ENCOUNTER — OFFICE VISIT (OUTPATIENT)
Dept: ORTHOPEDIC SURGERY | Age: 43
End: 2023-12-07

## 2023-12-07 VITALS — BODY MASS INDEX: 34.53 KG/M2 | HEIGHT: 67 IN | WEIGHT: 220 LBS

## 2023-12-07 DIAGNOSIS — M17.0 BILATERAL PRIMARY OSTEOARTHRITIS OF KNEE: ICD-10-CM

## 2023-12-07 DIAGNOSIS — M16.12 PRIMARY OSTEOARTHRITIS OF LEFT HIP: ICD-10-CM

## 2023-12-07 DIAGNOSIS — M51.36 DDD (DEGENERATIVE DISC DISEASE), LUMBAR: ICD-10-CM

## 2023-12-07 DIAGNOSIS — M25.552 LEFT HIP PAIN: ICD-10-CM

## 2023-12-07 DIAGNOSIS — M54.50 LUMBAR PAIN: Primary | ICD-10-CM

## 2023-12-07 DIAGNOSIS — M25.852 LEFT HIP IMPINGEMENT SYNDROME: ICD-10-CM

## 2023-12-07 DIAGNOSIS — M54.42 ACUTE LEFT-SIDED LOW BACK PAIN WITH LEFT-SIDED SCIATICA: ICD-10-CM

## 2023-12-07 NOTE — PROGRESS NOTES
deformity or injury. Range of motion is unremarkable. There is no gross instability. There are no rashes, ulcerations or lesions. Strength and tone are normal.  Left lower extremity: Examination of the left lower extremity does not show any tenderness, deformity or injury. Range of motion is unremarkable. There is no gross instability. There are no rashes, ulcerations or lesions. Strength and tone are normal.         Diagnostic Test Findings:   Bilateral knee AP and PA weightbearing sunrise and lateral films were 2023 and does show mild patellofemoral tilt and arthritic change with reasonably well-maintained articular surface height. AP and lateral lumbar spine films were also obtained 10/10/2023 and does show slight loss of lumbar lordosis with degenerative disc changes primarily L4-5 and L5-S1 with mild facet arthropathy without high-grade spondylolisthesis. Left hip AP pelvis and frog films performed 10/10/2023 does show evidence of mild left hip osteoarthritis without evidence of acute osseous injury. Left hip MRI obtained at [facility] [place] 10/19/2023 as listed above  MRI LOWER EXTREMITY Gurpreet Majestic CONTRAST  Order: [id number]  Status: Final result     Visible to patient: No (not released)     Next appt: 2023 at 07:15 AM in Physical Therapy Omar Retana PT)     0 Result Notes  Details    Narrative  Site: [facility] #: [id number] #: [id number] Procedure: MR Left Hip w/o Contrast ; Reason for Exam: evaluate osteoarthritis, synovitis, impingement   This document is confidential medical information. Unauthorized disclosure or use of this information is prohibited by law. If you are not the intended recipient of this document, please advise us by calling immediately [phone].      [facility] - [facility] [address]       Patient Name: Cheyenne Villegas   Case ID: [id number]   Patient : 1980   Referring Physician: Aniceto Juarez MD   Exam Date:

## 2023-12-11 ENCOUNTER — HOSPITAL ENCOUNTER (OUTPATIENT)
Dept: PHYSICAL THERAPY | Age: 43
Setting detail: THERAPIES SERIES
Discharge: HOME | End: 2023-12-11
Payer: COMMERCIAL

## 2023-12-11 PROCEDURE — 97110 THERAPEUTIC EXERCISES: CPT

## 2023-12-11 NOTE — PLAN OF CARE
110         Hip Extension             Hip IR 30 lateral hip discomfort 30         Hip ER 40 45            12/11/23  Strength L R Comment   Quad 5 5     Hamstring 5 5     Hip  flexion 5 5     Hip abd 5 5     Hip Ext 5 5     Hip IR 5 5     Hip ER 5 5     Ankle DF 5 5     Ankle PF 5 5     Ankle INV 5 5     Ankle ALFONSO 5 5        11/20/23  Special Test Results/Comment         LOTTIE Tightness only L and R   FADDIR Neg on Left   Scour Neg on Left         Quadrant Neg R and L sides   SLR Just hamstring tightness               Reflexes/Sensation:  11/20/23              [x]Dermatomes/Myotomes intact               [x]Reflexes equal and normal bilaterally              []Other:     Joint mobility:                []Normal                      [x]Hypo: decreased CPA and UPA mobility L4 and L5 with very mild pain 11/20/23              []Hyper     Palpation: no longer has any TTP at greater trochanter and glute muscles of left hip. 11/20/23           RESTRICTIONS/PRECAUTIONS: hip OA; Lumbar Facet OA/DDD;  Chronic right side sciatica  Exercises/Interventions:   ROM/stretches        DKTC     Hand Heel Rock     Child's Pose     Prone Press Up     Hamstring 3x30\"ea Belt   Quadriceps 3x30\"ea    Piriformis 3x30\"ea Seated in chair; foot across knee, trunk lean forward         Hook lying rotation     Cat and camel     Lumbar Flexion - SB Rollouts 5x10\" Forward  5x10\" ea R an L    Sciatic Nerve Floss x20 on ea    Hip Rogue Band Mobility Quadruped rock backs: 10x5\" holds with band behind  Long axis traction x 2' with leg in open packed position on foam roller         Strengthening     CORE     TA Iso     TA March     TA Heel Slide     TA BKFO        Dead Bug 2x10    Bird Dog 2x10ea Blue band         Palloff Press     Stir the Pot          GLUTES     Glute iso     Standing hip abd iso  Ball into wall   Bridge 10x5\" Blue around knees   Supine Clamshell     Side lying clamshell 3x10; Blue    Lateral band walks 2 laps; Blue at [employer]

## 2023-12-13 ENCOUNTER — APPOINTMENT (OUTPATIENT)
Dept: PHYSICAL THERAPY | Age: 43
End: 2023-12-13
Payer: COMMERCIAL

## 2024-03-19 ENCOUNTER — OFFICE VISIT (OUTPATIENT)
Dept: ORTHOPEDIC SURGERY | Age: 44
End: 2024-03-19
Payer: COMMERCIAL

## 2024-03-19 ENCOUNTER — TELEPHONE (OUTPATIENT)
Dept: ORTHOPEDIC SURGERY | Age: 44
End: 2024-03-19

## 2024-03-19 DIAGNOSIS — M17.11 PRIMARY OSTEOARTHRITIS OF RIGHT KNEE: ICD-10-CM

## 2024-03-19 DIAGNOSIS — M25.561 ACUTE PAIN OF RIGHT KNEE: Primary | ICD-10-CM

## 2024-03-19 DIAGNOSIS — M22.41 CHONDROMALACIA PATELLAE OF RIGHT KNEE: ICD-10-CM

## 2024-03-19 DIAGNOSIS — R22.41 KNEE MASS, RIGHT: ICD-10-CM

## 2024-03-19 PROCEDURE — 99214 OFFICE O/P EST MOD 30 MIN: CPT | Performed by: FAMILY MEDICINE

## 2024-03-19 RX ORDER — METHYLPREDNISOLONE 4 MG/1
TABLET ORAL
Qty: 21 KIT | Refills: 0 | Status: SHIPPED | OUTPATIENT
Start: 2024-03-19

## 2024-03-19 RX ORDER — AMOXICILLIN AND CLAVULANATE POTASSIUM 875; 125 MG/1; MG/1
1 TABLET, FILM COATED ORAL 2 TIMES DAILY
Qty: 14 TABLET | Refills: 0 | Status: SHIPPED | OUTPATIENT
Start: 2024-03-19 | End: 2024-03-26

## 2024-03-19 NOTE — PROGRESS NOTES
this office note. If so, please bring any errors to my attention for an addendum. All efforts were made to ensure that this office note is accurate.

## 2024-03-19 NOTE — TELEPHONE ENCOUNTER
Spoke to patient and informed them that their MRI has been authorized and that they can call and schedule scan at their convenience. Also told them that they can call and schedule a f/u with Dr. Sanford once they have MRI scheduled, leaving at least 2-3 days for our office to receive their results.

## 2024-03-19 NOTE — PATIENT INSTRUCTIONS
Stop diclofenac for the 6 days of the steroid pack. Can re-start when finished.     Can take antibiotics and steroid pack at the same time.

## 2024-03-28 ENCOUNTER — OFFICE VISIT (OUTPATIENT)
Dept: ORTHOPEDIC SURGERY | Age: 44
End: 2024-03-28
Payer: COMMERCIAL

## 2024-03-28 DIAGNOSIS — M17.11 PRIMARY OSTEOARTHRITIS OF RIGHT KNEE: ICD-10-CM

## 2024-03-28 DIAGNOSIS — R22.41 KNEE MASS, RIGHT: ICD-10-CM

## 2024-03-28 DIAGNOSIS — M25.561 ACUTE PAIN OF RIGHT KNEE: ICD-10-CM

## 2024-03-28 DIAGNOSIS — M70.51 PATELLAR BURSITIS OF RIGHT KNEE: Primary | ICD-10-CM

## 2024-03-28 DIAGNOSIS — M22.41 CHONDROMALACIA PATELLAE OF RIGHT KNEE: ICD-10-CM

## 2024-03-28 DIAGNOSIS — M70.51 PATELLAR BURSITIS OF RIGHT KNEE: ICD-10-CM

## 2024-03-28 PROCEDURE — 99214 OFFICE O/P EST MOD 30 MIN: CPT | Performed by: FAMILY MEDICINE

## 2024-03-28 RX ORDER — AMOXICILLIN AND CLAVULANATE POTASSIUM 875; 125 MG/1; MG/1
1 TABLET, FILM COATED ORAL 2 TIMES DAILY
Qty: 14 TABLET | Refills: 0 | Status: SHIPPED | OUTPATIENT
Start: 2024-03-28 | End: 2024-04-04

## 2024-03-28 NOTE — PROGRESS NOTES
Chief Complaint    Knee Pain (TR MRI R KNEE ) and Results    FU right knee pain with known history of mild knee chondromalacia osteoarthritis and newer onset focal anterior knee pain with possible cyst/mass with anterior swelling.  Review of right knee imaging    History of left-sided mechanical low back pain left lateral hip and left groin pain with MRI documented moderate left hip osteoarthritis with femoral acetabular impingement and trochanteric bursitis     History of Present Illness:  Gal Sanon is a 43 y.o. male who is a very pleasant active white male  for [employer] which is a fairly manual job is a patient of  Dr Destiny Ruiz who is being seen today upon self-referral for evaluation of an orthopedic condition to his right shoulder and right cervical spine.  He has been seen in the past for his mechanical back pain and right knee arthritis and is doing reasonably well with both conditions.  He is not complaining of high-grade right radicular symptoms most continue with his home-based exercises.  He is being seen today for evaluation of progressively worsening right shoulder pain and stiffness to the right side of the cervical spine.  He states that he has had episodic pain with active use his right shoulder for the past 3-4 years but does not recall specific history of actual injury.  Since roughly mid March 2019 he has had more consistent pain to his right shoulder superiorly and posteriorly with some pain laterally most associated with overhead activity reaching away from his body as well as reaching cross body internal rotation does feel weak and stiff.  He has had a history of popping in his shoulder.  He does not recall any specific history of actual injury but once again his job is fairly manual he does do frequent yardwork.  He has progressed the point where he is having a consistent achy pain ranges between a 5-9 out of 10 and is having difficulty sleeping.  He

## 2024-04-01 LAB
BACTERIA FLD AEROBE CULT: NORMAL
GRAM STN SPEC: NORMAL

## 2024-04-08 LAB
BACTERIA FLD AEROBE CULT: NORMAL
GRAM STN SPEC: NORMAL

## 2024-04-15 LAB
BACTERIA FLD AEROBE CULT: NORMAL
GRAM STN SPEC: NORMAL

## 2024-06-24 DIAGNOSIS — M25.561 ACUTE PAIN OF RIGHT KNEE: ICD-10-CM

## 2024-06-24 DIAGNOSIS — R22.41 KNEE MASS, RIGHT: ICD-10-CM

## 2024-06-24 DIAGNOSIS — M17.11 PRIMARY OSTEOARTHRITIS OF RIGHT KNEE: ICD-10-CM

## 2024-06-24 DIAGNOSIS — M22.41 CHONDROMALACIA PATELLAE OF RIGHT KNEE: ICD-10-CM

## 2024-06-24 DIAGNOSIS — M70.51 PATELLAR BURSITIS OF RIGHT KNEE: ICD-10-CM

## 2024-06-24 RX ORDER — AMOXICILLIN AND CLAVULANATE POTASSIUM 875; 125 MG/1; MG/1
1 TABLET, FILM COATED ORAL 2 TIMES DAILY
Qty: 14 TABLET | Refills: 0 | OUTPATIENT
Start: 2024-06-24 | End: 2024-07-01

## 2024-06-24 RX ORDER — METHYLPREDNISOLONE 4 MG/1
TABLET ORAL
OUTPATIENT
Start: 2024-06-24

## 2024-09-12 ENCOUNTER — OFFICE VISIT (OUTPATIENT)
Dept: ORTHOPEDIC SURGERY | Age: 44
End: 2024-09-12
Payer: COMMERCIAL

## 2024-09-12 VITALS — WEIGHT: 220 LBS | HEIGHT: 67 IN | BODY MASS INDEX: 34.53 KG/M2

## 2024-09-12 DIAGNOSIS — M70.62 GREATER TROCHANTERIC BURSITIS OF LEFT HIP: ICD-10-CM

## 2024-09-12 DIAGNOSIS — M25.852 LEFT HIP IMPINGEMENT SYNDROME: ICD-10-CM

## 2024-09-12 DIAGNOSIS — M16.12 PRIMARY OSTEOARTHRITIS OF LEFT HIP: ICD-10-CM

## 2024-09-12 DIAGNOSIS — M25.552 LEFT HIP PAIN: Primary | ICD-10-CM

## 2024-09-12 DIAGNOSIS — M17.0 BILATERAL PRIMARY OSTEOARTHRITIS OF KNEE: ICD-10-CM

## 2024-09-12 PROCEDURE — 99214 OFFICE O/P EST MOD 30 MIN: CPT | Performed by: FAMILY MEDICINE

## 2024-09-12 PROCEDURE — 20610 DRAIN/INJ JOINT/BURSA W/O US: CPT | Performed by: FAMILY MEDICINE

## 2024-09-12 RX ORDER — BETAMETHASONE SODIUM PHOSPHATE AND BETAMETHASONE ACETATE 3; 3 MG/ML; MG/ML
6 INJECTION, SUSPENSION INTRA-ARTICULAR; INTRALESIONAL; INTRAMUSCULAR; SOFT TISSUE ONCE
Status: COMPLETED | OUTPATIENT
Start: 2024-09-12 | End: 2024-09-12

## 2024-09-12 RX ORDER — DICLOFENAC SODIUM 75 MG/1
75 TABLET, DELAYED RELEASE ORAL 2 TIMES DAILY
Qty: 60 TABLET | Refills: 3 | Status: SHIPPED | OUTPATIENT
Start: 2024-09-12

## 2024-09-12 RX ORDER — BUPIVACAINE HYDROCHLORIDE 2.5 MG/ML
1 INJECTION, SOLUTION INFILTRATION; PERINEURAL ONCE
Status: COMPLETED | OUTPATIENT
Start: 2024-09-12 | End: 2024-09-12

## 2024-09-12 RX ADMIN — BETAMETHASONE SODIUM PHOSPHATE AND BETAMETHASONE ACETATE 6 MG: 3; 3 INJECTION, SUSPENSION INTRA-ARTICULAR; INTRALESIONAL; INTRAMUSCULAR; SOFT TISSUE at 14:47

## 2024-09-12 RX ADMIN — BUPIVACAINE HYDROCHLORIDE 2.5 MG: 2.5 INJECTION, SOLUTION INFILTRATION; PERINEURAL at 14:48

## 2024-09-12 RX ADMIN — Medication 1 ML: at 14:48

## 2024-09-19 ENCOUNTER — HOSPITAL ENCOUNTER (OUTPATIENT)
Dept: PHYSICAL THERAPY | Age: 44
Setting detail: THERAPIES SERIES
Discharge: HOME | End: 2024-09-19
Payer: COMMERCIAL

## 2024-09-19 DIAGNOSIS — R53.1 WEAKNESS: ICD-10-CM

## 2024-09-19 DIAGNOSIS — M54.50 LUMBAR PAIN: ICD-10-CM

## 2024-09-19 DIAGNOSIS — M25.552 LEFT HIP PAIN: Primary | ICD-10-CM

## 2024-09-19 PROCEDURE — 97161 PT EVAL LOW COMPLEX 20 MIN: CPT | Performed by: PHYSICAL THERAPIST

## 2024-09-19 PROCEDURE — 97140 MANUAL THERAPY 1/> REGIONS: CPT | Performed by: PHYSICAL THERAPIST

## 2024-09-19 PROCEDURE — 97112 NEUROMUSCULAR REEDUCATION: CPT | Performed by: PHYSICAL THERAPIST

## 2024-09-24 ENCOUNTER — HOSPITAL ENCOUNTER (OUTPATIENT)
Dept: PHYSICAL THERAPY | Age: 44
Setting detail: THERAPIES SERIES
Discharge: HOME | End: 2024-09-24
Payer: COMMERCIAL

## 2024-09-24 PROCEDURE — 97110 THERAPEUTIC EXERCISES: CPT | Performed by: PHYSICAL THERAPIST

## 2024-09-24 PROCEDURE — 97140 MANUAL THERAPY 1/> REGIONS: CPT | Performed by: PHYSICAL THERAPIST

## 2024-09-24 PROCEDURE — 97112 NEUROMUSCULAR REEDUCATION: CPT | Performed by: PHYSICAL THERAPIST

## 2024-09-26 ENCOUNTER — OFFICE VISIT (OUTPATIENT)
Dept: ORTHOPEDIC SURGERY | Age: 44
End: 2024-09-26
Payer: COMMERCIAL

## 2024-09-26 VITALS — WEIGHT: 220 LBS | BODY MASS INDEX: 34.53 KG/M2 | HEIGHT: 67 IN

## 2024-09-26 DIAGNOSIS — M16.12 PRIMARY OSTEOARTHRITIS OF LEFT HIP: Primary | ICD-10-CM

## 2024-09-26 DIAGNOSIS — M25.552 PAIN OF LEFT HIP: ICD-10-CM

## 2024-09-26 DIAGNOSIS — S73.192A TEAR OF LEFT ACETABULAR LABRUM, INITIAL ENCOUNTER: ICD-10-CM

## 2024-09-26 PROCEDURE — 99204 OFFICE O/P NEW MOD 45 MIN: CPT | Performed by: ORTHOPAEDIC SURGERY

## 2024-10-01 ENCOUNTER — HOSPITAL ENCOUNTER (OUTPATIENT)
Dept: PHYSICAL THERAPY | Age: 44
Setting detail: THERAPIES SERIES
Discharge: HOME | End: 2024-10-01
Payer: COMMERCIAL

## 2024-10-01 PROCEDURE — 97112 NEUROMUSCULAR REEDUCATION: CPT | Performed by: PHYSICAL THERAPIST

## 2024-10-01 PROCEDURE — 97110 THERAPEUTIC EXERCISES: CPT | Performed by: PHYSICAL THERAPIST

## 2024-10-01 PROCEDURE — 97140 MANUAL THERAPY 1/> REGIONS: CPT | Performed by: PHYSICAL THERAPIST

## 2024-10-01 NOTE — FLOWSHEET NOTE
include: Passive Range of Motion, Gr I-IV mobilizations, Soft Tissue Mobilization, Dry Needling/IASTM, and Trigger Point Release  Modalities as needed that may include: Cryotherapy  Patient education on joint protection, postural re-education, activity modification, and progression of HEP    Plan: POC initiated as per evaluation    Electronically Signed by Radha Barba PT, DPT  Date: 10/01/2024     Note: Portions of this note have been templated and/or copied from initial evaluation, reassessments and prior notes for documentation efficiency.    Note: If patient does not return for scheduled/recommended follow up visits, this note will serve as a discharge from care along with the most recent update on progress.    Ortho Evaluation

## 2024-10-03 ENCOUNTER — APPOINTMENT (OUTPATIENT)
Dept: PHYSICAL THERAPY | Age: 44
End: 2024-10-03
Payer: COMMERCIAL

## 2024-10-08 ENCOUNTER — HOSPITAL ENCOUNTER (OUTPATIENT)
Dept: PHYSICAL THERAPY | Age: 44
Setting detail: THERAPIES SERIES
Discharge: HOME | End: 2024-10-08
Payer: COMMERCIAL

## 2024-10-08 PROCEDURE — 97112 NEUROMUSCULAR REEDUCATION: CPT | Performed by: PHYSICAL THERAPIST

## 2024-10-08 PROCEDURE — 97140 MANUAL THERAPY 1/> REGIONS: CPT | Performed by: PHYSICAL THERAPIST

## 2024-10-08 PROCEDURE — 97110 THERAPEUTIC EXERCISES: CPT | Performed by: PHYSICAL THERAPIST

## 2024-10-08 NOTE — FLOWSHEET NOTE
[facility]- Outpatient Rehabilitation and Therapy [address], [place] [address] office: [phone] fax: [phone]      Physical Therapy: TREATMENT/PROGRESS NOTE   Patient: aGl Sanon (43 y.o. male)   Examination Date: 10/08/2024   :  1980 MRN: [id number]   Visit #: 4  Insurance Allowable Auth Needed   30 (4 used)  AUTH after 30 visits []Yes    []No    Insurance: Payor: BCBS / Plan: [employer]BS - [place] PPO / Product Type: *No Product type* /   Insurance ID: [id number] - ([place] BCBS)  Secondary Insurance (if applicable):    Treatment Diagnosis:     ICD-10-CM    1. Left hip pain  M25.552       2. Lumbar pain  M54.50       3. Weakness  R53.1          Medical Diagnosis:  Left hip pain [M25.552]  Primary osteoarthritis of left hip [M16.12]  Greater trochanteric bursitis of left hip [M70.62]  Left hip impingement syndrome [M25.852]   Referring Physician: Rolf Sanford MD  PCP: Destiny Ruiz MD     Plan of care signed (Y/N):     Date of Patient follow up with Physician:      Plan of Care Report: EVAL today  POC update due: (10 visits /OR AUTH LIMITS, whichever is less) 17 Oct 2024                                            Medical History:  Comorbidities:  Rheumatoid Arthritis  Relevant Medical History:                                          Precautions/ Contra-indications:           Latex allergy:  NO  Pacemaker:    NO  Contraindications for Manipulation: None  Date of Surgery:   Other:    Red Flags:  None    Suicide Screening:   The patient did not verbalize a primary behavioral concern, suicidal ideation, suicidal intent, or demonstrate suicidal behaviors.    Preferred Language for Healthcare:   [x] English       [] other:    SUBJECTIVE EXAMINATION     Patient stated complaint: states he feels like the pain is stagnant.   10/8     Test used Initial score  9/19/24 10/08/2024   Pain Summary VAS 4-5/10 today  9-10/10 at worst 4/10   Functional questionnaire LEFS 50%

## 2024-10-10 ENCOUNTER — HOSPITAL ENCOUNTER (OUTPATIENT)
Dept: PHYSICAL THERAPY | Age: 44
Setting detail: THERAPIES SERIES
Discharge: HOME | End: 2024-10-10
Payer: COMMERCIAL

## 2024-10-10 PROCEDURE — 97140 MANUAL THERAPY 1/> REGIONS: CPT | Performed by: PHYSICAL THERAPIST

## 2024-10-10 PROCEDURE — 97112 NEUROMUSCULAR REEDUCATION: CPT | Performed by: PHYSICAL THERAPIST

## 2024-10-10 PROCEDURE — 97110 THERAPEUTIC EXERCISES: CPT | Performed by: PHYSICAL THERAPIST

## 2024-10-10 NOTE — FLOWSHEET NOTE
10 2# ^10/8   Abduction  3 x 10 2# ^10/8   Hip extension 3 x 10 2# ^10/8   adduction     Supine abduction with tband          Band walks 35' x 3 laps blue loop TB  Added 9/24   Seated hip IR with BS 3 x 10 green loop TB  Added 10/1   Modified deadlift 3 x 10  5#KB on step stool Added 10/10   Seated hip flexion with ER     clams     SL dead lift     Monster walks with hip ABD kick 15' x 4 laps blue loop TB  Added 10/1   Wall sit with tband     bridges     Quadruped opposite LE/UE reaches          Manual interventions     Hip LA distraction and lateral hip mobilizations 8' grade II-III 10/8       Modalities:    No modalities applied this session    Education/Home Exercise Program: Patient HEP program created electronically.  Refer to Entrec access code: [email or web address]      ASSESSMENT     Today's Assessment: Pt shara session. Pt continues to observe hypomobility with L hip mobility. He continued to fatigue with SLR resistance, so it was modified. Pt did shara the addition of modified deadlift with no pain.   10/10    Medical Necessity Documentation:  I certify that this patient meets the below criteria necessary for medical necessity for care and/or justification of therapy services:  The patient has functional impairments and/or activity limitations and would benefit from continued outpatient therapy services to address the deficits outlined in the patients goals    Return to Play: NA    Prognosis for POC: [x] Good [] Fair  [] Poor    Patient requires continued skilled intervention: [x] Yes  [] No      CHARGE CAPTURE     PT CHARGE GRID   CPT Code (TIMED)  # CPT Code (UNTIMED) #     Therex (21561)   2  EVAL:LOW (30347 - Typically 20 minutes face-to-face)     Neuromusc. Re-ed (10745)  1  Re-Eval (47805)     Manual (24376)  1  Estim Unattended (59725)     Ther. Act (45108)    [facility]. Traction (40700)     Gait (72408)    Dry Needle 1-2 muscle (20560)     Aquatic Therex (61297)    Dry Needle 3+ muscle (20561)     Iontophoresis 
English

## 2024-10-15 ENCOUNTER — HOSPITAL ENCOUNTER (OUTPATIENT)
Dept: PHYSICAL THERAPY | Age: 44
Setting detail: THERAPIES SERIES
Discharge: HOME | End: 2024-10-15
Payer: COMMERCIAL

## 2024-10-15 PROCEDURE — 97110 THERAPEUTIC EXERCISES: CPT | Performed by: PHYSICAL THERAPIST

## 2024-10-15 PROCEDURE — 97140 MANUAL THERAPY 1/> REGIONS: CPT | Performed by: PHYSICAL THERAPIST

## 2024-10-15 NOTE — FLOWSHEET NOTE
activities.  [] Progressing: [] Met: [] Not Met: [] Adjusted  2. Patient will demonstrate increased L hip ABD AROM to greater than or equal to 30 deg without pain to allow for proper joint functioning to enable patient to get in and out of the car.    [] Progressing: [] Met: [] Not Met: [] Adjusted  3. Patient will demonstrate increased L hip (flex, ABD, and ext) strength to 4/5 to allow for proper functional mobility to enable patient to return to work pain free.   [] Progressing: [] Met: [] Not Met: [] Adjusted  4. Patient will return to ADLs and sleeping without increased symptoms or restriction.   [] Progressing: [] Met: [] Not Met: [] Adjusted  5. Patient will report working without increased symptoms for 1 week.   [] Progressing: [] Met: [] Not Met: [] Adjusted     Overall Progression Towards Functional goals/ Treatment Progress Update:  [] Patient is progressing as expected towards functional goals listed.    [] Progression is slowed due to complexities/Impairments listed.  [] Progression has been slowed due to co-morbidities.  [x] Plan just implemented, too soon (<30days) to assess goals progression   [] Goals require adjustment due to lack of progress  [] Patient is not progressing as expected and requires additional follow up with physician  [] Other:     TREATMENT PLAN     Frequency/Duration: 2x/week for 6 weeks for the following treatment interventions:    Interventions:  Therapeutic Exercise (18357) including: strength training, ROM, and functional mobility  Therapeutic Activities (72527) including: functional mobility training and education.  Neuromuscular Re-education (13906) activation and proprioception, including postural re-education.    Manual Therapy (45245) as indicated to include: Passive Range of Motion, Gr I-IV mobilizations, Soft Tissue Mobilization, Dry Needling/IASTM, and Trigger Point Release  Modalities as needed that may include: Cryotherapy  Patient education on joint protection,

## 2024-10-22 ENCOUNTER — HOSPITAL ENCOUNTER (OUTPATIENT)
Dept: PHYSICAL THERAPY | Age: 44
Setting detail: THERAPIES SERIES
Discharge: HOME | End: 2024-10-22
Payer: COMMERCIAL

## 2024-10-22 PROCEDURE — 97140 MANUAL THERAPY 1/> REGIONS: CPT | Performed by: PHYSICAL THERAPIST

## 2024-10-22 PROCEDURE — 97110 THERAPEUTIC EXERCISES: CPT | Performed by: PHYSICAL THERAPIST

## 2024-10-22 NOTE — FLOWSHEET NOTE
glides - supine 3 x 10  Added 9/19        PRE’s     SLR- supine 3 x 10 2# ^10/8   Abduction  3 x 10 2# ^10/8   Hip extension 3 x 10 2# ^10/8   adduction     Supine abduction with tband          Band walks 35' x 3 laps blue loop TB  Added 9/24   Seated hip IR with BS 3 x 10 blue loop TB  ^10/22   Modified deadlift 3 x 10  5#KB on step stool Added 10/10   Seated hip flexion with ER     clams     SL dead lift     Monster walks with hip ABD kick 15' x 4 laps blue loop TB  Added 10/1   Wall sit with tband     bridges     Quadruped opposite LE/UE reaches          Manual interventions     Hip LA distraction and lateral hip mobilizations 8' grade II-III 10/8       Modalities:    No modalities applied this session    Education/Home Exercise Program: Patient HEP program created electronically.  Refer to [employer] access code: [email or web address]      ASSESSMENT     Today's Assessment: Pt shara session well. He had to leave at a certain time today, so nothing new was added. He demonstrated increased hip IR strength, noted by increased resistance. Will re-assess next session.   10/22    Medical Necessity Documentation:  I certify that this patient meets the below criteria necessary for medical necessity for care and/or justification of therapy services:  The patient has functional impairments and/or activity limitations and would benefit from continued outpatient therapy services to address the deficits outlined in the patients goals    Return to Play: NA    Prognosis for POC: [x] Good [] Fair  [] Poor    Patient requires continued skilled intervention: [x] Yes  [] No      CHARGE CAPTURE     PT CHARGE GRID   CPT Code (TIMED)  # CPT Code (UNTIMED) #     Therex (93769)   2  EVAL:LOW (31480 - Typically 20 minutes face-to-face)     Neuromusc. Re-ed (54803)    Re-Eval (81392)     Manual (76010)  1  Estim Unattended (56779)     Ther. Act (68094)    [employer]ech. Traction (35323)     Gait (96482)    Dry Needle 1-2 muscle (49843)     Aquatic Therex (29510)

## 2024-10-29 ENCOUNTER — HOSPITAL ENCOUNTER (OUTPATIENT)
Dept: PHYSICAL THERAPY | Age: 44
Setting detail: THERAPIES SERIES
Discharge: HOME | End: 2024-10-29
Payer: COMMERCIAL

## 2024-10-29 PROCEDURE — 97110 THERAPEUTIC EXERCISES: CPT | Performed by: PHYSICAL THERAPIST

## 2024-10-29 PROCEDURE — 97140 MANUAL THERAPY 1/> REGIONS: CPT | Performed by: PHYSICAL THERAPIST

## 2024-10-29 NOTE — FLOWSHEET NOTE
today  9-10/10 at worst 3/10   Functional questionnaire LEFS 50% limitation    Other:                OBJECTIVE EXAMINATION     Standing Exam Normal Abnormal N/A Comments   Toe walk         Heel Walk       Side bending    Pain on L with RSB   Pelvic Height       Fwd Bend- (aberrant juttering or innominate mvmt) Normal   Hamstring tightness   Extension Normal       Trendelenburg       Kemps/Quadrant       Stork       SLS/SLS with rotation                     Seated Exam Normal Abnormal N/A Comments   Pelvic Height       Seated Rotation       Seated flexion       B hip IR       Slump  Abnormal  Pos on LLE          Supine Exam Normal Abnormal N/A Comments   LOTTIE/Harrison  Abnormal  Pain in L    FAIR  Abnormal   Pain in L    FADIR Normal       Hip scour       SLR  Abnormal  R - 80 deg  L - 64 deg + pain   Crossed SLR Normal      Supine to sit       SI distraction/compression       Thigh thrust  Abnormal  Pain in L    Gaenslen's test              Prone Exam Normal Abnormal N/A Comments   Prone knee bend       Prone hip IR       B Achilles reflex/Pheasant       PA/Spring  Abnormal  Pain on L L2-3   Prone Instability test       Sacral Spring/thrust Normal                       ROM LEFT RIGHT Comments   Hip Flexion 110 111    Hip Abd 12 + pain  34    Hip ER      Hip IR      Hip Extension              Strength LEFT RIGHT Comments   Multifidus      Transverse Ab      Hip Flexors 3+/5 4-/5    Hip Abductors 4-/5 4/5    Hip Extensors 4-/-5 4/5                   Joint mobility:    []Normal    [x]Hypo - lumbar   []Hyper    Palpation: TTP - L lumbar paraspinals    Gait: (include devices/WB status) WFL      Exercises/Interventions     ROM/STRETCHES     Bike 5'  Added 9/24   Supine hamstring 30 sec x 5  Added 9/19   Seated piriformis     Supine piriformis 30 sec x 5 Added 9/19   Supine figure 4     Quad       ITB     Butterfly     Hip ADD stretch 10 sec x 10  Added 9/24             Sciatic nerve glides - supine 3 x 10  Added 9/19

## 2024-11-06 ENCOUNTER — OFFICE VISIT (OUTPATIENT)
Dept: ORTHOPEDIC SURGERY | Age: 44
End: 2024-11-06

## 2024-11-06 VITALS — RESPIRATION RATE: 12 BRPM | HEIGHT: 67 IN | BODY MASS INDEX: 34.53 KG/M2 | WEIGHT: 220 LBS

## 2024-11-06 DIAGNOSIS — M16.12 PRIMARY OSTEOARTHRITIS OF LEFT HIP: Primary | ICD-10-CM

## 2024-11-06 DIAGNOSIS — S73.192A TEAR OF LEFT ACETABULAR LABRUM, INITIAL ENCOUNTER: ICD-10-CM

## 2024-11-06 RX ORDER — METHYLPREDNISOLONE ACETATE 40 MG/ML
80 INJECTION, SUSPENSION INTRA-ARTICULAR; INTRALESIONAL; INTRAMUSCULAR; SOFT TISSUE ONCE
Status: COMPLETED | OUTPATIENT
Start: 2024-11-06 | End: 2024-11-06

## 2024-11-06 RX ORDER — ROPIVACAINE HYDROCHLORIDE 5 MG/ML
14 INJECTION, SOLUTION EPIDURAL; INFILTRATION; PERINEURAL ONCE
Status: COMPLETED | OUTPATIENT
Start: 2024-11-06 | End: 2024-11-06

## 2024-11-06 RX ADMIN — METHYLPREDNISOLONE ACETATE 80 MG: 40 INJECTION, SUSPENSION INTRA-ARTICULAR; INTRALESIONAL; INTRAMUSCULAR; SOFT TISSUE at 16:16

## 2024-11-06 RX ADMIN — ROPIVACAINE HYDROCHLORIDE 14 ML: 5 INJECTION, SOLUTION EPIDURAL; INFILTRATION; PERINEURAL at 16:16

## 2024-11-06 NOTE — PROGRESS NOTES
11/6/24  3:06 PM        NDC: 97063-415-71   -   Ropivacaine 0.5 %    LOT: [id number]    COMMENT: LEFT HIP INTRA-ARTICULAR CORTISONE INJECTION      NDC: [address]-9   -   Depo Medrol 40mg    LOT: [id number]    COMMENT: LEFT HIP INTRA-ARTICULAR CORTISONE INJECTION

## 2024-11-07 NOTE — PROGRESS NOTES
Date:  2024    Name:  Gal Sanon  Address:  [address] [address]    :  1980      Age:   43 y.o.    SSN:  [id number]      Medical Record Number:  [id number]    Reason for Visit:    Chief Complaint    Hip Pain (U/S left hip IAC )      DOS:2024     HPI: Gal Sanon is a 43 y.o. male here today for  pre scheduled left hip IAC.     Pain assessment is documented below.          Pain Assessment  Location of Pain: Hip  Location Modifiers: Left  Severity of Pain: 5  Quality of Pain: Sharp, Aching  Frequency of Pain: Constant  Aggravating Factors: Walking, Other (Comment) (sleeping on side)  Limiting Behavior: Some  Relieving Factors: Rest, Nsaids (PT)  Result of Injury: No  Work-Related Injury: No  ROS: Review of systems reviewed from Patient History Form completed today and available in the patient's chart under the Media tab.       Past Medical History:   Diagnosis Date    Arthritis     Right Knee    Diverticulitis 2017        Past Surgical History:   Procedure Laterality Date    DENTAL SURGERY      Removal molars    SHOULDER ARTHROSCOPY Right 2019    RIGHT SHOULDER ARTHROSCOPY, DEBRIDEMENT BICEPS TENOTOMY DECOMPRESSION [place] OPEN SUBPECTORAL BICEP TENODESIS performed by Chas Lion MD at [facility] OR    SHOULDER ARTHROSCOPY Left 2021    LEFT SHOULDER ARTHROSCOPY, DECOMPRESSION [place] BICEP TENODESIS performed by Chas Lion MD at [facility] OR       Family History   Problem Relation Age of Onset    High Blood Pressure Mother     Diabetes Father     Arthritis Father     Diabetes Brother        Social History     Socioeconomic History    Marital status:      Spouse name: None    Number of children: None    Years of education: None    Highest education level: None   Tobacco Use    Smoking status: Former    Smokeless tobacco: Never    Tobacco comments:     quit 2016   Vaping Use    Vaping status: Never Used   Substance and Sexual Activity    Alcohol use: Yes

## 2024-12-09 ENCOUNTER — TELEPHONE (OUTPATIENT)
Dept: ORTHOPEDIC SURGERY | Age: 44
End: 2024-12-09

## 2024-12-19 ENCOUNTER — OFFICE VISIT (OUTPATIENT)
Dept: ORTHOPEDIC SURGERY | Age: 44
End: 2024-12-19
Payer: COMMERCIAL

## 2024-12-19 VITALS — WEIGHT: 220 LBS | BODY MASS INDEX: 34.53 KG/M2 | HEIGHT: 67 IN

## 2024-12-19 DIAGNOSIS — M16.12 PRIMARY OSTEOARTHRITIS OF LEFT HIP: Primary | ICD-10-CM

## 2024-12-19 PROCEDURE — 99213 OFFICE O/P EST LOW 20 MIN: CPT

## 2024-12-19 NOTE — PROGRESS NOTES
Date:  2024    Name:  Gal Sanon  Address:  [address] [address]    :  1980      Age:   44 y.o.    SSN:  [id number]      Medical Record Number:  [id number]    Reason for Visit:    Chief Complaint    Hip Pain (Left hip)      DOS:2024     HPI: Gal Sanon is a 44 y.o. male here today for follow up after left hip IAC on 24. He notes complete relief following injection.     Pain assessment is documented below.          Pain Assessment  Location of Pain: Hip  Location Modifiers: Left  Severity of Pain: 2  Frequency of Pain: Intermittent  Aggravating Factors: Walking, Other (Comment) (NIGHT/WORKING)  Limiting Behavior: Yes  Relieving Factors: Rest, Nsaids  Result of Injury: No  Work-Related Injury: No  ROS: Review of systems reviewed from Patient History Form completed today and available in the patient's chart under the Media tab.       Past Medical History:   Diagnosis Date    Arthritis     Right Knee    Diverticulitis 2017        Past Surgical History:   Procedure Laterality Date    DENTAL SURGERY      Removal molars    SHOULDER ARTHROSCOPY Right 2019    RIGHT SHOULDER ARTHROSCOPY, DEBRIDEMENT BICEPS TENOTOMY DECOMPRESSION [place] OPEN SUBPECTORAL BICEP TENODESIS performed by Chas Lion MD at [facility] OR    SHOULDER ARTHROSCOPY Left 2021    LEFT SHOULDER ARTHROSCOPY, DECOMPRESSION [place] BICEP TENODESIS performed by Chas Lion MD at [facility] OR       Family History   Problem Relation Age of Onset    High Blood Pressure Mother     Diabetes Father     Arthritis Father     Diabetes Brother        Social History     Socioeconomic History    Marital status:      Spouse name: None    Number of children: None    Years of education: None    Highest education level: None   Tobacco Use    Smoking status: Former    Smokeless tobacco: Never    Tobacco comments:     quit 2016   Vaping Use    Vaping status: Never Used   Substance and Sexual Activity

## 2025-01-02 DIAGNOSIS — M16.12 PRIMARY OSTEOARTHRITIS OF LEFT HIP: Primary | ICD-10-CM

## 2025-01-17 ENCOUNTER — TELEPHONE (OUTPATIENT)
Dept: ORTHOPEDIC SURGERY | Age: 45
End: 2025-01-17

## 2025-01-17 NOTE — TELEPHONE ENCOUNTER
S/w patient regarding denial of Durolane injection.  Denial reason: not a covered benefit.  He may proceed with injection out of pocket and was informed of $565.10 charge.  Patient was asked to call back if she wishes to proceed.  If he wishes to proceed, order will be sent to supplier at that time.

## 2025-07-08 ENCOUNTER — TELEPHONE (OUTPATIENT)
Dept: ORTHOPEDIC SURGERY | Age: 45
End: 2025-07-08

## 2025-07-08 NOTE — TELEPHONE ENCOUNTER
----- Message from Vangie MEHTA sent at 7/8/2025  2:12 PM EDT -----  Regarding: Specialist Appointment Request - Established  Specialist Appointment Request - Established    What Specialty? Select Speciality: Orthopedics     Type of Appointment: Appointment Type: Injection    Reason for appointment?  [Enter diagnosis or symptom: KNEE PAIN REQUEST INJECTION APPT    Scheduling Preference per Patient: Date, Time, Provider: DR BOOGIE      Additional Information: PLEASE CALL BACK ASAP TO SCHEDULE APPT.     Pt IS GOING TO WANT TO DISCUSS SCHEDULING SX AS WELL.   --------------------------------------------------------------------------------------------------------------------------    Relationship to Patient: Self  Call Back Information: OK to leave message on voicemail  Preferred Call Back Number:  [phone]

## 2025-07-09 NOTE — TELEPHONE ENCOUNTER
S/w patient.  He wants to schedule appointment for another left hip injection.  Patient also wanting to schedule surgery.  He is aware he will have to wait 3 months from time of injection to surgery.  He notes not wanting to proceed with surgery until December 2025.  He will call back to schedule f/u left hip appointment at a later date, as his wife is having surgery and he needs to check his schedule.  All questions answered.

## 2025-09-02 ENCOUNTER — OFFICE VISIT (OUTPATIENT)
Dept: ORTHOPEDIC SURGERY | Age: 45
End: 2025-09-02

## 2025-09-02 VITALS — HEIGHT: 67 IN | RESPIRATION RATE: 12 BRPM | BODY MASS INDEX: 34.53 KG/M2 | WEIGHT: 220 LBS

## 2025-09-02 DIAGNOSIS — M16.12 PRIMARY OSTEOARTHRITIS OF LEFT HIP: Primary | ICD-10-CM

## 2025-09-02 DIAGNOSIS — S73.192D TEAR OF LEFT ACETABULAR LABRUM, SUBSEQUENT ENCOUNTER: ICD-10-CM

## 2025-09-02 RX ORDER — DICLOFENAC SODIUM 75 MG/1
75 TABLET, DELAYED RELEASE ORAL 2 TIMES DAILY
Qty: 60 TABLET | Refills: 3 | Status: SHIPPED | OUTPATIENT
Start: 2025-09-02

## 2025-09-02 RX ORDER — METHYLPREDNISOLONE ACETATE 40 MG/ML
80 INJECTION, SUSPENSION INTRA-ARTICULAR; INTRALESIONAL; INTRAMUSCULAR; SOFT TISSUE ONCE
Status: COMPLETED | OUTPATIENT
Start: 2025-09-02 | End: 2025-09-02

## 2025-09-02 RX ORDER — ROPIVACAINE HYDROCHLORIDE 5 MG/ML
14 INJECTION, SOLUTION EPIDURAL; INFILTRATION; PERINEURAL ONCE
Status: COMPLETED | OUTPATIENT
Start: 2025-09-02 | End: 2025-09-02

## 2025-09-02 RX ADMIN — METHYLPREDNISOLONE ACETATE 80 MG: 40 INJECTION, SUSPENSION INTRA-ARTICULAR; INTRALESIONAL; INTRAMUSCULAR; SOFT TISSUE at 15:14

## 2025-09-02 RX ADMIN — ROPIVACAINE HYDROCHLORIDE 14 ML: 5 INJECTION, SOLUTION EPIDURAL; INFILTRATION; PERINEURAL at 15:15

## 2025-09-04 ENCOUNTER — HOSPITAL ENCOUNTER (OUTPATIENT)
Dept: MRI IMAGING | Age: 45
Discharge: HOME | End: 2025-09-04
Payer: COMMERCIAL

## 2025-09-04 DIAGNOSIS — M16.12 PRIMARY OSTEOARTHRITIS OF LEFT HIP: ICD-10-CM

## 2025-09-04 DIAGNOSIS — S73.192D TEAR OF LEFT ACETABULAR LABRUM, SUBSEQUENT ENCOUNTER: ICD-10-CM

## 2025-09-04 PROCEDURE — 73721 MRI JNT OF LWR EXTRE W/O DYE: CPT

## (undated) DEVICE — SUTURE FIBERWIRE SZ 2 W/ TAPERED NEEDLE BLUE L38IN NONABSORB BLU L26.5MM 1/2 CIRCLE AR7200

## (undated) DEVICE — 3M™ COBAN™ NL STERILE NON-LATEX SELF-ADHERENT WRAP, 2086S, 6 IN X 5 YD (15 CM X 4,5 M), 12 ROLLS/CASE: Brand: 3M™ COBAN™

## (undated) DEVICE — SUTURE NONABSORBABLE MONOFILAMENT 4-0 FS-2 18 IN ETHILON 662H

## (undated) DEVICE — SHOULDER ARTHROSCOPY: Brand: MEDLINE INDUSTRIES, INC.

## (undated) DEVICE — STERILE LATEX POWDER-FREE SURGICAL GLOVESWITH NITRILE COATING: Brand: PROTEXIS

## (undated) DEVICE — HYPODERMIC SAFETY NEEDLE: Brand: MAGELLAN

## (undated) DEVICE — TUBING, SUCTION, 1/4" X 12', STRAIGHT: Brand: MEDLINE

## (undated) DEVICE — PROBE ABLAT XL 90DEG ASPIR BPLR RF 1 PC ELECTRD ERGO HNDL

## (undated) DEVICE — COVER LT HNDL BLU PLAS

## (undated) DEVICE — BUR SHV L13CM DIA4MM 8 FLUT OVL FOR RAP AGG BNE RESECT

## (undated) DEVICE — STERILE POLYISOPRENE POWDER-FREE SURGICAL GLOVES: Brand: PROTEXIS

## (undated) DEVICE — SYRINGE 20ML LL S/C 50

## (undated) DEVICE — TUBING PMP L16FT MAIN DISP FOR AR-6400 AR-6475

## (undated) DEVICE — GOWN,REINF,POLY,AURORA,XLNG/XL,STRL: Brand: MEDLINE

## (undated) DEVICE — CONTAINER,SPECIMEN,PNEU TUBE,3OZ,OR STRL: Brand: MEDLINE

## (undated) DEVICE — DRAPE,REIN 53X77,STERILE: Brand: MEDLINE

## (undated) DEVICE — DRAPE,SHOULDER,BEACH CHAIR,STERILE: Brand: MEDLINE

## (undated) DEVICE — CHLORAPREP 26ML ORANGE

## (undated) DEVICE — 1010 S-DRAPE TOWEL DRAPE 10/BX: Brand: STERI-DRAPE™

## (undated) DEVICE — BLADE SHV L13CM DIA4MM TAPR TIP SCIS LIKE CUT OVL OUTER

## (undated) DEVICE — STRIP,CLOSURE,WOUND,MEDI-STRIP,1/2X4: Brand: MEDLINE

## (undated) DEVICE — ELECTRODE PT RET AD L9FT HI MOIST COND ADH HYDRGEL CORDED

## (undated) DEVICE — 3M™ TEGADERM™ TRANSPARENT FILM DRESSING FRAME STYLE, 1626W, 4 IN X 4-3/4 IN (10 CM X 12 CM), 50/CT 4CT/CASE: Brand: 3M™ TEGADERM™

## (undated) DEVICE — DECANTER BAG 9": Brand: MEDLINE INDUSTRIES, INC.

## (undated) DEVICE — STOCKINETTE IMPERV M 9X44IN POLY INNR LAYR COT ORTH EXT

## (undated) DEVICE — SUTURE PDS II SZ 0 L27IN ABSRB VLT L36MM CT-1 1/2 CIR Z340H

## (undated) DEVICE — ALCOHOL RUBBING ISO 16OZ 70%

## (undated) DEVICE — MERCY HEALTH WEST TURNOVER: Brand: MEDLINE INDUSTRIES, INC.

## (undated) DEVICE — MAJOR SET UP PK

## (undated) DEVICE — INTENDED TO SUPPORT AND MAINTAIN THE POSITION OF AN ANESTHETIZED PATIENT DURING SURGERY: Brand: ERIN BEACH CHAIR FACE MASK

## (undated) DEVICE — SPONGE GZ W4XL4IN COT 12 PLY TYP VII WVN C FLD DSGN

## (undated) DEVICE — 4-PORT MANIFOLD: Brand: NEPTUNE 2

## (undated) DEVICE — 3M™ STERI-DRAPE™ U-DRAPE 1015: Brand: STERI-DRAPE™

## (undated) DEVICE — CANNULA ARTHSCP L7CM DIA6MM CONIC TIP THRD NONSHIELDED DISP

## (undated) DEVICE — DRAPE,U/SHT,SPLIT,FILM,60X84,STERILE: Brand: MEDLINE

## (undated) DEVICE — PAD DRY FLOOR ABS 32X58IN GRN

## (undated) DEVICE — SHOULDER CANNULA SET WITHOUT FENESTRATIONS, 5.5 MM (I.D.) X 70 MM: Brand: CONMED

## (undated) DEVICE — SOLUTION IV IRRIG POUR BRL 0.9% SODIUM CHL 2F7124

## (undated) DEVICE — BLADE SHV L13CM DIA4MM EXCALIBUR AGG COOLCUT

## (undated) DEVICE — NEEDLE SUT PASS FOR ROT CUF LABRAL REP SUREFIRE SCORPION

## (undated) DEVICE — INTENDED FOR TISSUE SEPARATION, AND OTHER PROCEDURES THAT REQUIRE A SHARP SURGICAL BLADE TO PUNCTURE OR CUT.: Brand: BARD-PARKER ® STAINLESS STEEL BLADES

## (undated) DEVICE — 3M™ IOBAN™ 2 ANTIMICROBIAL INCISE DRAPE 6650EZ: Brand: IOBAN™ 2

## (undated) DEVICE — NEEDLE SPNL L3.5IN PNK HUB S STL REG WALL FIT STYL W/ QNCKE

## (undated) DEVICE — SOLUTION IV IRRIG LACTATED RINGERS 3000ML 2B7487

## (undated) DEVICE — GOWN SIRUS NONREIN LG W/TWL: Brand: MEDLINE INDUSTRIES, INC.

## (undated) DEVICE — KIT INSTR W/ 2.4MM GUIDEPIN SUT PASS WIRE NO2 FIBERWIRE

## (undated) DEVICE — KIT SHLDR STBL MARCO FOR SPIDER LIMB POS